# Patient Record
Sex: FEMALE | Race: WHITE | NOT HISPANIC OR LATINO | Employment: OTHER | URBAN - NONMETROPOLITAN AREA
[De-identification: names, ages, dates, MRNs, and addresses within clinical notes are randomized per-mention and may not be internally consistent; named-entity substitution may affect disease eponyms.]

---

## 2017-02-01 ENCOUNTER — COMMUNICATION - GICH (OUTPATIENT)
Dept: INTERNAL MEDICINE | Facility: OTHER | Age: 82
End: 2017-02-01

## 2017-02-01 DIAGNOSIS — I10 ESSENTIAL (PRIMARY) HYPERTENSION: ICD-10-CM

## 2017-02-20 ENCOUNTER — OFFICE VISIT - GICH (OUTPATIENT)
Dept: INTERNAL MEDICINE | Facility: OTHER | Age: 82
End: 2017-02-20

## 2017-02-20 ENCOUNTER — HISTORY (OUTPATIENT)
Dept: INTERNAL MEDICINE | Facility: OTHER | Age: 82
End: 2017-02-20

## 2017-02-20 DIAGNOSIS — I42.9 CARDIOMYOPATHY (H): ICD-10-CM

## 2017-02-20 DIAGNOSIS — E78.1 PURE HYPERGLYCERIDEMIA: ICD-10-CM

## 2017-02-20 DIAGNOSIS — E11.9 TYPE 2 DIABETES MELLITUS WITHOUT COMPLICATIONS (H): ICD-10-CM

## 2017-02-20 DIAGNOSIS — M1A.9XX0 CHRONIC GOUT WITHOUT TOPHUS: ICD-10-CM

## 2017-02-20 DIAGNOSIS — D64.9 ANEMIA: ICD-10-CM

## 2017-02-20 DIAGNOSIS — I48.20 CHRONIC ATRIAL FIBRILLATION (H): ICD-10-CM

## 2017-02-20 DIAGNOSIS — I10 ESSENTIAL (PRIMARY) HYPERTENSION: ICD-10-CM

## 2017-02-20 DIAGNOSIS — E66.9 OBESITY: ICD-10-CM

## 2017-02-20 DIAGNOSIS — N18.30 CHRONIC KIDNEY DISEASE, STAGE III (MODERATE) (H): ICD-10-CM

## 2017-02-20 LAB
A/G RATIO - HISTORICAL: 1.1 (ref 1–2)
ABSOLUTE BASOPHILS - HISTORICAL: 0 THOU/CU MM
ABSOLUTE EOSINOPHILS - HISTORICAL: 0.1 THOU/CU MM
ABSOLUTE LYMPHOCYTES - HISTORICAL: 1.8 THOU/CU MM (ref 0.9–2.9)
ABSOLUTE MONOCYTES - HISTORICAL: 0.4 THOU/CU MM
ABSOLUTE NEUTROPHILS - HISTORICAL: 3.9 THOU/CU MM (ref 1.7–7)
ALBUMIN SERPL-MCNC: 3.8 G/DL (ref 3.5–5.7)
ALP SERPL-CCNC: 48 IU/L (ref 34–104)
ALT (SGPT) - HISTORICAL: 6 IU/L (ref 7–52)
ANION GAP - HISTORICAL: 11 (ref 5–18)
AST SERPL-CCNC: 11 IU/L (ref 13–39)
BASOPHILS # BLD AUTO: 0.8 %
BILIRUB SERPL-MCNC: 0.5 MG/DL (ref 0.3–1)
BUN SERPL-MCNC: 28 MG/DL (ref 7–25)
BUN/CREAT RATIO - HISTORICAL: 20
CALCIUM SERPL-MCNC: 8.9 MG/DL (ref 8.6–10.3)
CHLORIDE SERPLBLD-SCNC: 104 MMOL/L (ref 98–107)
CHOL/HDL RATIO - HISTORICAL: 3.13
CHOLESTEROL TOTAL: 122 MG/DL
CO2 SERPL-SCNC: 25 MMOL/L (ref 21–31)
CREAT SERPL-MCNC: 1.39 MG/DL (ref 0.7–1.3)
EOSINOPHIL NFR BLD AUTO: 2.3 %
ERYTHROCYTE [DISTWIDTH] IN BLOOD BY AUTOMATED COUNT: 13.2 % (ref 11.5–15.5)
ESTIMATED AVERAGE GLUCOSE: 120 MG/DL
GFR IF NOT AFRICAN AMERICAN - HISTORICAL: 36 ML/MIN/1.73M2
GLOBULIN - HISTORICAL: 3.4 G/DL (ref 2–3.7)
GLUCOSE SERPL-MCNC: 122 MG/DL (ref 70–105)
HCT VFR BLD AUTO: 35.1 % (ref 33–51)
HDLC SERPL-MCNC: 39 MG/DL (ref 23–92)
HEMOGLOBIN A1C MONITORING (POCT) - HISTORICAL: 5.8 % (ref 4–6.2)
HEMOGLOBIN: 11.8 G/DL (ref 12–16)
LDLC SERPL CALC-MCNC: 54 MG/DL
LYMPHOCYTES NFR BLD AUTO: 28.2 % (ref 20–44)
MCH RBC QN AUTO: 32.7 PG (ref 26–34)
MCHC RBC AUTO-ENTMCNC: 33.5 G/DL (ref 32–36)
MCV RBC AUTO: 98 FL (ref 80–100)
MONOCYTES NFR BLD AUTO: 6.2 %
NEUTROPHILS NFR BLD AUTO: 62.6 % (ref 42–72)
NON-HDL CHOLESTEROL - HISTORICAL: 83 MG/DL
PATIENT STATUS - HISTORICAL: NORMAL
PLATELET # BLD AUTO: 168 THOU/CU MM (ref 140–440)
PMV BLD: 6.1 FL (ref 6.5–11)
POTASSIUM SERPL-SCNC: 5 MMOL/L (ref 3.5–5.1)
PROT SERPL-MCNC: 7.2 G/DL (ref 6.4–8.9)
RED BLOOD COUNT - HISTORICAL: 3.59 MIL/CU MM (ref 4–5.2)
SODIUM SERPL-SCNC: 140 MMOL/L (ref 133–143)
TRIGL SERPL-MCNC: 146 MG/DL
TSH - HISTORICAL: 1.69 UIU/ML (ref 0.34–5.6)
WHITE BLOOD COUNT - HISTORICAL: 6.2 THOU/CU MM (ref 4.5–11)

## 2017-04-30 ENCOUNTER — COMMUNICATION - GICH (OUTPATIENT)
Dept: INTERNAL MEDICINE | Facility: OTHER | Age: 82
End: 2017-04-30

## 2017-04-30 DIAGNOSIS — B35.9 DERMATOPHYTOSIS: ICD-10-CM

## 2017-05-26 ENCOUNTER — COMMUNICATION - GICH (OUTPATIENT)
Dept: INTERNAL MEDICINE | Facility: OTHER | Age: 82
End: 2017-05-26

## 2017-05-26 DIAGNOSIS — R32 URINARY INCONTINENCE: ICD-10-CM

## 2017-06-05 ENCOUNTER — COMMUNICATION - GICH (OUTPATIENT)
Dept: INTERNAL MEDICINE | Facility: OTHER | Age: 82
End: 2017-06-05

## 2017-06-05 DIAGNOSIS — D64.9 ANEMIA: ICD-10-CM

## 2017-06-20 ENCOUNTER — COMMUNICATION - GICH (OUTPATIENT)
Dept: INTERNAL MEDICINE | Facility: OTHER | Age: 82
End: 2017-06-20

## 2017-06-20 DIAGNOSIS — I10 ESSENTIAL (PRIMARY) HYPERTENSION: ICD-10-CM

## 2017-08-08 ENCOUNTER — HISTORY (OUTPATIENT)
Dept: INTERNAL MEDICINE | Facility: OTHER | Age: 82
End: 2017-08-08

## 2017-08-08 ENCOUNTER — OFFICE VISIT - GICH (OUTPATIENT)
Dept: INTERNAL MEDICINE | Facility: OTHER | Age: 82
End: 2017-08-08

## 2017-08-08 DIAGNOSIS — E11.9 TYPE 2 DIABETES MELLITUS WITHOUT COMPLICATIONS (H): ICD-10-CM

## 2017-08-08 DIAGNOSIS — N18.30 CHRONIC KIDNEY DISEASE, STAGE III (MODERATE) (H): ICD-10-CM

## 2017-08-08 DIAGNOSIS — D64.9 ANEMIA: ICD-10-CM

## 2017-08-08 LAB
ESTIMATED AVERAGE GLUCOSE: 128 MG/DL
HEMOGLOBIN A1C MONITORING (POCT) - HISTORICAL: 6.1 % (ref 4–6.2)

## 2017-08-08 ASSESSMENT — PATIENT HEALTH QUESTIONNAIRE - PHQ9: SUM OF ALL RESPONSES TO PHQ QUESTIONS 1-9: 0

## 2017-09-19 ENCOUNTER — OFFICE VISIT - GICH (OUTPATIENT)
Dept: INTERNAL MEDICINE | Facility: OTHER | Age: 82
End: 2017-09-19

## 2017-09-19 ENCOUNTER — COMMUNICATION - GICH (OUTPATIENT)
Dept: INTERNAL MEDICINE | Facility: OTHER | Age: 82
End: 2017-09-19

## 2017-09-19 ENCOUNTER — HISTORY (OUTPATIENT)
Dept: INTERNAL MEDICINE | Facility: OTHER | Age: 82
End: 2017-09-19

## 2017-09-19 DIAGNOSIS — R60.9 EDEMA: ICD-10-CM

## 2017-09-19 ASSESSMENT — PATIENT HEALTH QUESTIONNAIRE - PHQ9: SUM OF ALL RESPONSES TO PHQ QUESTIONS 1-9: 0

## 2017-10-03 ENCOUNTER — AMBULATORY - GICH (OUTPATIENT)
Dept: FAMILY MEDICINE | Facility: OTHER | Age: 82
End: 2017-10-03

## 2017-10-03 DIAGNOSIS — Z23 ENCOUNTER FOR IMMUNIZATION: ICD-10-CM

## 2017-10-20 ENCOUNTER — COMMUNICATION - GICH (OUTPATIENT)
Dept: INTERNAL MEDICINE | Facility: OTHER | Age: 82
End: 2017-10-20

## 2017-12-27 NOTE — PROGRESS NOTES
Patient Information     Patient Name MRN Sex Lety Contreras [id number] Female 1929      Progress Notes by Rodney Nelson MD at 2017  8:40 AM     Author:  Rodney Nelson MD Service:  (none) Author Type:  Physician     Filed:  2017  8:57 AM Encounter Date:  2017 Status:  Signed     :  Rodney Nelson MD (Physician)            SUBJECTIVE:    Lety Luna is a 88 y.o. female who presents for recheck on DM.    HPI Comments: This patient comes in today for follow-up on her medical problems. She has type 2 diabetes mellitus and takes metformin on a regular basis. Her historical A1c values have been very good. She admits that she eats too much bread and in fact her weight is up from a year ago. We did talk about this today and she will try to limit the amount of bread that she is eating. She also has a history of chronic anemia from chronic GI loss. She is on iron therapy daily. 6 months ago her hemoglobin was quite good at 11.8. She has not noticed any further bleeding. She has a history of atrial fibrillation but she is not on anticoagulation because of the above.    She also has a history of chronic kidney disease. Her renal function has been very stable including her last visit and last analysis of this. She has treated hypertension and tolerates her medications. She does not have any problems with peripheral edema, she does watch this on a regular basis and she does try to avoid sodium.    Other than that she has no complaints or concerns. She just wishes she could walk a little bit better. She does use a walker for balance and safety.      No Known Allergies,   Current Outpatient Prescriptions     Medication  Sig     allopurinol (ZYLOPRIM) 100 mg tablet Take 1 tablet by mouth once daily.     aspirin 81 mg tablet Take 81 mg by mouth once daily with a meal.     captopril (CAPOTEN) 50 mg tablet Take 1 tablet by mouth 2 times daily.     diltiazem CD (CARDIZEM CD) 240 mg extended release  24 hr capsule Take 1 capsule by mouth once daily.     DME Urinary incontinence pad / panty liner     ferrous sulfate, 65 mg elemental, tablet TAKE 1 TABLET BY MOUTH TWICE DAILY WITH MEALS     LOTRIMIN AF 2 % powder APPLY TOPICALLY TO THE AFFECTED AREA TWICE DAILY IF NEEDED     metFORMIN (GLUCOPHAGE) 500 mg tablet Take 1 tablet by mouth once daily with a meal.     metoprolol succinate SR (TOPROL XL) 200 mg Sustained-Release tablet Take 1 tablet by mouth once daily.     omeprazole (PRILOSEC) 20 mg Delayed-Release capsule Take 1 capsule by mouth once daily before a meal.     prazosin (MINIPRESS) 5 mg capsule TAKE 2 CAPSULES BY MOUTH EVERY MORNING AND TAKE 1 CAPSULE BY MOUTH EVERY EVENING     triamterene-hydrochlorothiazide, 37.5-25 mg, (DYAZIDE) 37.5-25 mg capsule Take 1 capsule by mouth every morning.     No current facility-administered medications for this visit.      Medications have been reviewed by me and are current to the best of my knowledge and ability. ,   Past Medical History:     Diagnosis  Date     Atrial fibrillation (HC)     No anticoagulation due to GI bleed      Chronic kidney disease (CKD), stage III (moderate)      Diabetes mellitus type II      GI bleed 2012    3 units pRBC      Gout      Hyperlipidemia      Hypertension      Learning disability      Obesity      Osteopenia     and   Patient Active Problem List       Diagnosis  Date Noted     Chronic anemia  07/22/2015     Cardiomyopathy (HC)  09/16/2014     Probable rate related from atrial fibrillation        GI BLEEDING  07/25/2012     WEIGHT LOSS  07/25/2012     CANDIDIASIS  01/21/2012     ARTHRITIS  10/04/2010     ATRIAL FIBRILLATION  02/09/2010     CHRONIC KIDNEY DISEASE STAGE III (MODERATE)  02/09/2010     HYPERTENSION       GOUT       LEARNING DISABILITY       DIABETES MELLITUS, TYPE II       OSTEOPENIA       OBESITY       HYPERTRIGLYCERIDEMIA         REVIEW OF SYSTEMS:  Review of Systems   All other systems reviewed and are  "negative.      OBJECTIVE:  /78  Pulse 80  Ht 1.549 m (5' 1\")  Wt 84.7 kg (186 lb 12.8 oz)  Breastfeeding? No  BMI 35.3 kg/m2    EXAM:   Physical Exam   Constitutional: She is well-developed, well-nourished, and in no distress. No distress.   Cardiovascular: Normal rate.  An irregularly irregular rhythm present.   Pulmonary/Chest: Effort normal and breath sounds normal. No respiratory distress. She has no wheezes. She has no rales.   Musculoskeletal: She exhibits no edema.   Skin: Skin is warm and dry. She is not diaphoretic.   Nursing note and vitals reviewed.      ASSESSMENT/PLAN:    ICD-10-CM    1. DIABETES MELLITUS, TYPE II E11.9 HEMOGLOBIN A1C MONITORING (POCT)   2. CHRONIC KIDNEY DISEASE STAGE III (MODERATE) N18.3    3. Chronic anemia D64.9         Plan:  She really appears to be doing well aside from the fact that her weight is up from 6 months ago and a year ago. I encouraged her to pay more attention to this and try to get her weight down a little bit. Medications will continue the same. The only lab I elected to do today was an A1c as 6 months ago her other lab including renal function and hemoglobin really looked quite good and stable. I will send her a letter with the results. Assuming all goes well, she will follow-up in 6 months for a yearly review and yearly lab work. Obviously follow-up sooner for problems.          "

## 2017-12-28 NOTE — TELEPHONE ENCOUNTER
Patient Information     Patient Name MRN Lety Rush [id number] Female 1929      Telephone Encounter by Jessica Correa LPN at 2017  9:11 AM     Author:  Jessica Correa LPN Service:  (none) Author Type:  NURS- Licensed Practical Nurse     Filed:  2017  9:12 AM Encounter Date:  2017 Status:  Signed     :  Jessica Correa LPN (NURS- Licensed Practical Nurse)            Contacted the patient and gave her the appointment time below. The patient was placed in Rodney Nelson MD schedule.  Jessica Correa LPN......2017  9:11 AM

## 2017-12-28 NOTE — PROGRESS NOTES
Patient Information     Patient Name MRN Sex Lety Contreras [id number] Female 1929      Progress Notes by Rodney Nelson MD at 2017  2:20 PM     Author:  Rodney Nelson MD Service:  (none) Author Type:  Physician     Filed:  2017  2:11 PM Encounter Date:  2017 Status:  Signed     :  Rodney Nelson MD (Physician)            SUBJECTIVE:    Lety Luna is a 88 y.o. female who presents for ankle pain and swelling.    HPI Comments: She is in today concerned about swelling of her left ankle. It's been that way for the last 2-3 weeks. There was no injury. There is no particular pain associated with this. It's definitely just the left side more than anything or anywhere else that is bothersome for her. She hasn't had this problem in the past.      No Known Allergies,   Current Outpatient Prescriptions     Medication  Sig     allopurinol (ZYLOPRIM) 100 mg tablet Take 1 tablet by mouth once daily.     aspirin 81 mg tablet Take 81 mg by mouth once daily with a meal.     captopril (CAPOTEN) 50 mg tablet Take 1 tablet by mouth 2 times daily.     diltiazem CD (CARDIZEM CD) 240 mg extended release 24 hr capsule Take 1 capsule by mouth once daily.     DME Urinary incontinence pad / panty liner     ferrous sulfate, 65 mg elemental, tablet TAKE 1 TABLET BY MOUTH TWICE DAILY WITH MEALS     LOTRIMIN AF 2 % powder APPLY TOPICALLY TO THE AFFECTED AREA TWICE DAILY IF NEEDED     metFORMIN (GLUCOPHAGE) 500 mg tablet Take 1 tablet by mouth once daily with a meal.     metoprolol succinate SR (TOPROL XL) 200 mg Sustained-Release tablet Take 1 tablet by mouth once daily.     omeprazole (PRILOSEC) 20 mg Delayed-Release capsule Take 1 capsule by mouth once daily before a meal.     prazosin (MINIPRESS) 5 mg capsule TAKE 2 CAPSULES BY MOUTH EVERY MORNING AND TAKE 1 CAPSULE BY MOUTH EVERY EVENING     triamterene-hydrochlorothiazide, 37.5-25 mg, (DYAZIDE) 37.5-25 mg capsule Take 1 capsule by mouth every  morning.     No current facility-administered medications for this visit.      Medications have been reviewed by me and are current to the best of my knowledge and ability. ,   Past Medical History:     Diagnosis  Date     Atrial fibrillation (HC)     No anticoagulation due to GI bleed      Chronic kidney disease (CKD), stage III (moderate)      Diabetes mellitus type II      GI bleed 2012    3 units pRBC      Gout      Hyperlipidemia      Hypertension      Learning disability      Obesity      Osteopenia     and   Patient Active Problem List       Diagnosis  Date Noted     Peripheral edema  09/19/2017     Chronic anemia  07/22/2015     Cardiomyopathy (HC)  09/16/2014     Probable rate related from atrial fibrillation        GI BLEEDING  07/25/2012     WEIGHT LOSS  07/25/2012     CANDIDIASIS  01/21/2012     ARTHRITIS  10/04/2010     ATRIAL FIBRILLATION  02/09/2010     CHRONIC KIDNEY DISEASE STAGE III (MODERATE)  02/09/2010     HYPERTENSION       GOUT       LEARNING DISABILITY       DIABETES MELLITUS, TYPE II       OSTEOPENIA       OBESITY       HYPERTRIGLYCERIDEMIA         REVIEW OF SYSTEMS:  Review of Systems   All other systems reviewed and are negative.      OBJECTIVE:  /82  Pulse 80  Wt 85.3 kg (188 lb)  Breastfeeding? No  BMI 35.52 kg/m2    EXAM:   Physical Exam   Constitutional: She is well-developed, well-nourished, and in no distress. No distress.   Musculoskeletal: She exhibits edema.   2+ edema left, 1+ edema right   Skin: She is not diaphoretic.   Nursing note and vitals reviewed.      ASSESSMENT/PLAN:    ICD-10-CM    1. Peripheral edema R60.9 furosemide (LASIX) 20 mg tablet        Plan:  She has some mild peripheral edema. I suggested that she try to keep her legs elevated and avoid sodium. Lasix 20 mg daily when necessary swelling was also provided. Follow-up if not improving with this.

## 2017-12-28 NOTE — TELEPHONE ENCOUNTER
Patient Information     Patient Name MRN Lety Rush [id number] Female 1929      Telephone Encounter by La Wayne at 2017  8:32 AM     Author:  La Wayne Service:  (none) Author Type:  (none)     Filed:  2017  8:35 AM Encounter Date:  2017 Status:  Signed     :  La Wayne            Patients left ankle is swollen. She has been soaking it to see if that will help. She said its been like this for a couple weeks now. It swells up and then goes down. She would like to get in for an appointment this week.    La Wayne ....................  2017   8:34 AM'

## 2017-12-28 NOTE — TELEPHONE ENCOUNTER
"Patient Information     Patient Name MRN Lety Rush [id number] Female 1929      Telephone Encounter by Ellen Monroe RN at 10/20/2017  9:32 AM     Author:  Ellen Monroe RN Service:  (none) Author Type:  NURS- Registered Nurse     Filed:  10/20/2017  9:42 AM Encounter Date:  10/20/2017 Status:  Signed     :  Ellen Monroe RN (NURS- Registered Nurse)            Verified last name and date of birth.     Patient instructed to continue applying antibiotic ointment to sore with band-aid, allowing sore to dry out from time to time for scab formation. Patient removed scab today after bath to let it air out. Instructed Patient to call back or make an appointment if signs of infection (redness, swelling or warmth appear) or if she has been treating >1week and it is not improving. Patient verbalized agreement with plan.    Reason for Disposition    1 or 2 small sores    Answer Assessment - Initial Assessment Questions  1. APPEARANCE of SORES: \"What do the sores look like?\"  Sore with scab, brown around the edges.  2. NUMBER: \"How many sores are there?\"      1  3. SIZE: \"How big is the largest sore?\"      Size of thumb nail  4. LOCATION: \"Where are the sores located?\"      Left knee  5. ONSET: \"When did the sores begin?\"      Last 2-3 days  6. CAUSE: \"What do you think is causing the sores?\"      Doesn't remember hurting her knee  7. OTHER SYMPTOMS: \"Do you have any other symptoms?\" (e.g., fever, new weakness)  No, denies redness, warmth or yellow discharge, pain or fever.    Patient has been putting neosporin and band-aids on the sore.    Protocols used: ADULT SORES-A-    Ellen Monroe RN .............. 10/20/2017  9:42 AM              "

## 2017-12-28 NOTE — TELEPHONE ENCOUNTER
Patient Information     Patient Name MRN Sex Lety Contreras [id number] Female 1929      Telephone Encounter by Evonne Milton RN at 2017  9:49 AM     Author:  Evonne Milton RN Service:  (none) Author Type:  NURS- Registered Nurse     Filed:  2017 10:08 AM Encounter Date:  2017 Status:  Signed     :  Evonne Milton RN (NURS- Registered Nurse)            Redundant Refill Request refused;  Medication:diltiazem CD (CARDIZEM CD) 240 mg extended release 24 hr capsule    Qty:90 capsule  Ref:3  Start:2017   Unable to complete prescription refill per RN Medication Refill Policy.................... Evonne Milton RN ....................  2017   10:04 AM

## 2017-12-28 NOTE — TELEPHONE ENCOUNTER
Patient Information     Patient Name MRN Sex Lety Contreras [id number] Female 1929      Telephone Encounter by Ellen Urena RN at 2017  3:11 PM     Author:  Ellen Urena RN Service:  (none) Author Type:  NURS- Registered Nurse     Filed:  2017  3:13 PM Encounter Date:  2017 Status:  Signed     :  Ellen Urena RN (NURS- Registered Nurse)            Office visit in the past 12 months or per provider note.    Last visit with ANASTASIIA ARGUETA was on: 2017 in [facility]  Next visit with ANASTASIIA ARGUETA is on: No future appointment listed with this provider  Next visit with Internal Medicine is on: No future appointment listed in this department    Lab test requirements:  Annual hemoglobin.  HEMOGLOBIN                (g/dL)    Date Value   2017 11.8 (L)       Max refill for 12 months from last office visit or per provider note.    Prescription refilled per RN Medication Refill Policy.................... Ellen Urena RN ....................  2017   3:13 PM

## 2017-12-28 NOTE — TELEPHONE ENCOUNTER
Patient Information     Patient Name MRN Sex Lety Contreras [id number] Female 1929      Telephone Encounter by Rodney Nelson MD at 2017  9:09 AM     Author:  Rodney Nelson MD Service:  (none) Author Type:  Physician     Filed:  2017  9:09 AM Encounter Date:  2017 Status:  Signed     :  Rodney Nelson MD (Physician)            2:20 PM today

## 2017-12-30 NOTE — NURSING NOTE
Patient Information     Patient Name MRN Sex Lety Contreras [id number] Female 1929      Nursing Note by Jessica Correa LPN at 2017  2:20 PM     Author:  Jessica Correa LPN Service:  (none) Author Type:  NURS- Licensed Practical Nurse     Filed:  2017  2:00 PM Encounter Date:  2017 Status:  Signed     :  Jessica Correa LPN (NURS- Licensed Practical Nurse)            The patient is here today to have her left ankle looked at for some swelling. She states it has been going on for about 2-3 weeks.  Jessica Correa LPN......2017  1:54 PM

## 2017-12-30 NOTE — NURSING NOTE
Patient Information     Patient Name MRN Lety Rush [id number] Female 1929      Nursing Note by Jessica Correa LPN at 2017  8:40 AM     Author:  Jessica Correa LPN Service:  (none) Author Type:  NURS- Licensed Practical Nurse     Filed:  2017  8:46 AM Encounter Date:  2017 Status:  Signed     :  Jessica Correa LPN (NURS- Licensed Practical Nurse)            The patient is here today to have a diabetic check up. The patient last saw her eye doctor in 2017.  Jessica Correa LPN......2017  8:25 AM

## 2018-01-03 NOTE — NURSING NOTE
Patient Information     Patient Name MRN Lety Rush [id number] Female 1929      Nursing Note by Jessica Correa LPN at 2017  8:30 AM     Author:  Jessica Correa LPN Service:  (none) Author Type:  NURS- Licensed Practical Nurse     Filed:  2017  8:26 AM Encounter Date:  2017 Status:  Signed     :  Jessica Correa LPN (NURS- Licensed Practical Nurse)            The patient is here today to have a yearly check up done.  Jessica Correa LPN......2017  8:18 AM

## 2018-01-03 NOTE — PROGRESS NOTES
Patient Information     Patient Name MRN Sex Lety Contreras [id number] Female 1929      Progress Notes by Rodney Nelson MD at 2017  8:30 AM     Author:  Rodney Nelson MD Service:  (none) Author Type:  Physician     Filed:  2017  8:51 AM Encounter Date:  2017 Status:  Signed     :  Rodney Nelson MD (Physician)            SUBJECTIVE:    Lety Luna is a 87 y.o. female who presents for comprehensive review of their multiple medical problems and review of medications, renewal of medications and update on necessary health maintenance issues.      HPI Comments: She is here today for complete evaluation and review of her chronic medical problems. She has a history of type 2 diabetes mellitus. She takes metformin once daily and this results in excellent control of her diabetes. She is due for recheck at this time.    She also has a history of chronic atrial fibrillation and cardiomyopathy. Her ejection fraction is approximately 40%. She is not on any anticoagulation due to previous history of GI bleeding. She is stable with her heart and her heart disease and has no complaints related to that.    She has significant hypertension and is on multidrug therapy for this. She does have some mild chronic kidney disease but not enough to preclude her from using low-dose metformin. She also has a history of chronic anemia as related to her previous GI bleeding and takes iron on a regular basis. She is due for recheck on that.    She no longer gets much for health maintenance things including mammography. She is due for some lab work. Immunizations are all up to date. I spent time today updating in reviewing her past medical history, past surgical history, family history and social history.      No Known Allergies,   Current Outpatient Prescriptions     Medication  Sig     allopurinol (ZYLOPRIM) 100 mg tablet Take 1 tablet by mouth once daily.     aspirin 81 mg tablet Take 81 mg by mouth  once daily with a meal.     captopril (CAPOTEN) 50 mg tablet Take 1 tablet by mouth 2 times daily.     diltiazem CD (CARDIZEM CD) 240 mg extended release 24 hr capsule Take 1 capsule by mouth once daily.     ferrous sulfate, 65 mg elemental, tablet Take 1 tablet by mouth 2 times daily with meals.     metFORMIN (GLUCOPHAGE) 500 mg tablet Take 1 tablet by mouth once daily with a meal.     metoprolol succinate SR (TOPROL XL) 200 mg Sustained-Release tablet TAKE 1 TABLET BY MOUTH ONCE DAILY     miconazole nitrate powder (LOTRIMIN AF) 2 % powder Apply  topically to affected area(s) 2 times daily if needed for Other (Specify).     omeprazole (PRILOSEC) 20 mg Delayed-Release capsule Take 1 capsule by mouth once daily before a meal.     prazosin (MINIPRESS) 5 mg capsule TAKE 2 CAPSULES BY MOUTH EVERY MORNING AND TAKE 1 CAPSULE BY MOUTH EVERY EVENING     triamterene-hydrochlorothiazide, 37.5-25 mg, (DYAZIDE) 37.5-25 mg capsule Take 1 capsule by mouth every morning.     No current facility-administered medications for this visit.      Medications have been reviewed by me and are current to the best of my knowledge and ability. ,   Past Medical History      Diagnosis   Date     Atrial fibrillation (HC)       No anticoagulation due to GI bleed      Chronic kidney disease (CKD), stage III (moderate)       Diabetes mellitus type II       GI bleed  2012     3 units pRBC      Gout       Hyperlipidemia       Hypertension       Learning disability       Obesity       Osteopenia     ,   Patient Active Problem List       Diagnosis  Date Noted     Chronic anemia  07/22/2015     Cardiomyopathy (HC)  09/16/2014     Probable rate related from atrial fibrillation        GI BLEEDING  07/25/2012     WEIGHT LOSS  07/25/2012     CANDIDIASIS  01/21/2012     ARTHRITIS  10/04/2010     ATRIAL FIBRILLATION  02/09/2010     CHRONIC KIDNEY DISEASE STAGE III (MODERATE)  02/09/2010     HYPERTENSION       GOUT       LEARNING DISABILITY       DIABETES  MELLITUS, TYPE II       OSTEOPENIA       OBESITY       HYPERTRIGLYCERIDEMIA     ,   Past Surgical History      Procedure  Laterality Date     Appendectomy       Dilation and curettage       Tubal ligation       Cataract removal Bilateral     and   Social History     Substance Use Topics       Smoking status: Never Smoker     Smokeless tobacco: Not on file     Alcohol use No     Family Status     Relation  Status     Father     heart problems and diabetes      Mother     heart problems and diabetes      Son Alive     Brother      Sister      Son      Sister      Brother      Social History     Social History        Marital status:       Spouse name: N/A     Number of children:  N/A     Years of education:  N/A     Social History Main Topics       Smoking status: Never Smoker     Smokeless tobacco: None     Alcohol use No     Drug use: No     Sexual activity: Not Asked     Other Topics  Concern     None      Social History Narrative     Cigarettes-none.  Alcohol-none.     and lives in an apartment in town.                     REVIEW OF SYSTEMS:  Review of Systems   Constitutional: Negative for chills, diaphoresis, fever, malaise/fatigue and weight loss.   HENT: Negative for congestion, ear pain, nosebleeds, sore throat and tinnitus.    Eyes: Negative for blurred vision, double vision, photophobia, pain, discharge and redness.   Respiratory: Negative for cough, hemoptysis, sputum production, shortness of breath and wheezing.    Cardiovascular: Negative for chest pain, palpitations, orthopnea, claudication, leg swelling and PND.   Gastrointestinal: Negative for abdominal pain, blood in stool, constipation, diarrhea, heartburn, nausea and vomiting.   Genitourinary: Negative for dysuria, flank pain and hematuria.   Musculoskeletal: Negative for back pain, joint pain, myalgias and neck pain.   Skin: Negative for itching and rash.   Neurological: Negative for  "dizziness, tingling, tremors, speech change, loss of consciousness, weakness and headaches.   Psychiatric/Behavioral: Negative for depression, hallucinations, memory loss, substance abuse and suicidal ideas. The patient is not nervous/anxious.        OBJECTIVE:  /82  Pulse 84  Ht 1.549 m (5' 1\")  Wt 83.8 kg (184 lb 12.8 oz)  Breastfeeding? No  BMI 34.92 kg/m2    EXAM:   Physical Exam   Constitutional: She is oriented to person, place, and time and well-developed, well-nourished, and in no distress. No distress.   HENT:   Head: Normocephalic and atraumatic.   Right Ear: Tympanic membrane and external ear normal.   Left Ear: Tympanic membrane and external ear normal.   Nose: Nose normal.   Mouth/Throat: Oropharynx is clear and moist and mucous membranes are normal. No oropharyngeal exudate.   Eyes: Conjunctivae and EOM are normal. Pupils are equal, round, and reactive to light.   Neck: Trachea normal and normal range of motion. Neck supple. Normal carotid pulses, no hepatojugular reflux and no JVD present. Carotid bruit is not present. No thyroid mass and no thyromegaly present.   Cardiovascular: Normal rate, normal heart sounds and intact distal pulses.  An irregularly irregular rhythm present. Exam reveals no gallop and no friction rub.    No murmur heard.  Pulmonary/Chest: Breath sounds normal. No respiratory distress. She has no decreased breath sounds. She has no wheezes. She has no rhonchi. She has no rales. She exhibits no tenderness. Right breast exhibits no inverted nipple, no mass, no nipple discharge, no skin change and no tenderness. Left breast exhibits no inverted nipple, no mass, no nipple discharge, no skin change and no tenderness. Breasts are symmetrical.   Abdominal: Soft. Bowel sounds are normal. She exhibits no distension and no mass. There is no hepatosplenomegaly. There is no tenderness. There is no rebound and no guarding. No hernia.   Musculoskeletal: Normal range of motion. She exhibits " no edema or tenderness.   Lymphadenopathy:     She has no cervical adenopathy.   Neurological: She is alert and oriented to person, place, and time. She has normal motor skills and normal reflexes. No cranial nerve deficit. Gait normal. Coordination normal.   Skin: Skin is warm and dry. No rash noted. She is not diaphoretic. No cyanosis or erythema. No pallor. Nails show no clubbing.   Psychiatric: Memory, affect and judgment normal.   Nursing note and vitals reviewed.      ASSESSMENT/PLAN:    ICD-10-CM    1. Chronic anemia D64.9 CBC AND DIFFERENTIAL   2. Cardiomyopathy (HC) I42.9    3. Chronic atrial fibrillation (HC) I48.2    4. CHRONIC KIDNEY DISEASE STAGE III (MODERATE) N18.3 COMP METABOLIC PANEL   5. HYPERTENSION I10    6. Chronic gout without tophus, unspecified cause, unspecified site [id number]    7. DIABETES MELLITUS, TYPE II E11.9 HEMOGLOBIN A1C MONITORING (POCT)      TSH   8. HYPERTRIGLYCERIDEMIA E78.1 LIPID PANEL   9. Obesity, unspecified obesity severity, unspecified obesity type E66.9         Plan:  Her exam today is stable. Everything else is up-to-date and acceptable. She will continue with her current medications without any change. Complete lab panel is pending and I will send a letter with the results. If that looks normal think that will be very reassuring that everything is stable and under control medically. If there are any abnormalities, we may have to address those. Follow-up will be dependent on her results. Medications are refilled for her today without change.

## 2018-01-03 NOTE — TELEPHONE ENCOUNTER
Patient Information     Patient Name MRN Sex Lety Contreras [id number] Female 1929      Telephone Encounter by Cecy Gresham RN at 2017  2:30 PM     Author:  Cecy Gresham RN Service:  (none) Author Type:  NURS- Registered Nurse     Filed:  2017  2:36 PM Encounter Date:  2017 Status:  Signed     :  Cecy Gresham RN (NURS- Registered Nurse)            Beta Blockers     Office visit in the past 12 months or per provider note.    Last visit with ANASTASIIA ARGUETA was on: 2016 in [facility]  Next visit with ANASTASIIA ARGUETA is on: 2017 in [facility]  Next visit with Internal Medicine is on: 2017 in [facility]    Max refill for 12 months from last office visit or per provider note.  Prescription refilled per RN Medication Refill Policy.................... CECY GRESHAM RN ....................  2017   2:35 PM

## 2018-01-04 ENCOUNTER — COMMUNICATION - GICH (OUTPATIENT)
Dept: INTERNAL MEDICINE | Facility: OTHER | Age: 83
End: 2018-01-04

## 2018-01-04 DIAGNOSIS — R60.9 EDEMA: ICD-10-CM

## 2018-01-04 NOTE — TELEPHONE ENCOUNTER
Patient Information     Patient Name MRN Sex Lety Contreras [id number] Female 1929      Telephone Encounter by Tammy Kaur RN at 2017 12:15 PM     Author:  Tammy Kaur RN Service:  (none) Author Type:  NURS- Registered Nurse     Filed:  2017 12:18 PM Encounter Date:  2017 Status:  Signed     :  Tammy Kaur RN (NURS- Registered Nurse)            Medication is not on refill protocol. Unable to complete prescription refill per RN Medication Refill Policy.................... TAMMY KAUR RN ....................  2017   12:16 PM        This is a Refill request from: [facility]  Name of Medication: Lotrimin AF 2% powder  Quantity requested: 90g  Last fill date: 16  Last visit with ANASTASIIA ARGUETA was on: 2017 in [facility]  PCP:  Anastasiia Argueta MD  Controlled Substance Agreement:  na   Diagnosis r/t this medication request: tinea

## 2018-01-05 NOTE — TELEPHONE ENCOUNTER
Patient Information     Patient Name MRLety Davenport [id number] Female 1929      Telephone Encounter by Jessica Correa LPN at 2017  2:24 PM     Author:  Jessica Correa LPN Service:  (none) Author Type:  NURS- Licensed Practical Nurse     Filed:  2017  2:26 PM Encounter Date:  2017 Status:  Signed     :  Jessica Correa LPN (NURS- Licensed Practical Nurse)            Contacted the patients daughter in law ashley, she states the patient is requesting a prescription for urinary incontinence pads. She states that she uses about 3 of them a day. The patient does not want briefs she is requesting a panty liner.  Jessica Correa LPN......2017  2:25 PM

## 2018-01-05 NOTE — TELEPHONE ENCOUNTER
Patient Information     Patient Name MRN Sex Lety Contreras [id number] Female 1929      Telephone Encounter by Clarissa Rees at 2017  1:57 PM     Author:  Clarissa Rees Service:  (none) Author Type:  (none)     Filed:  2017  1:58 PM Encounter Date:  2017 Status:  Signed     :  Clarissa Rees            MAF-DAUGHTER IN LAW WANTS RX FOR STRONGER URINARY INCONTINENCE PADS. CALL [facility] -[phone].  Clarissa Rees ....................  2017   1:57 PM

## 2018-01-23 ENCOUNTER — DOCUMENTATION ONLY (OUTPATIENT)
Dept: FAMILY MEDICINE | Facility: OTHER | Age: 83
End: 2018-01-23

## 2018-01-23 PROBLEM — M94.9 DISORDER OF BONE AND CARTILAGE: Status: ACTIVE | Noted: 2018-01-23

## 2018-01-23 PROBLEM — E78.1 HYPERTRIGLYCERIDEMIA: Status: ACTIVE | Noted: 2018-01-23

## 2018-01-23 PROBLEM — R60.0 PERIPHERAL EDEMA: Status: ACTIVE | Noted: 2017-09-19

## 2018-01-23 PROBLEM — E66.9 OBESITY: Status: ACTIVE | Noted: 2018-01-23

## 2018-01-23 PROBLEM — E11.9 DIABETES MELLITUS, TYPE II (H): Status: ACTIVE | Noted: 2018-01-23

## 2018-01-23 PROBLEM — M89.9 DISORDER OF BONE AND CARTILAGE: Status: ACTIVE | Noted: 2018-01-23

## 2018-01-23 PROBLEM — F81.9 LEARNING DISABILITY: Status: ACTIVE | Noted: 2018-01-23

## 2018-01-23 PROBLEM — I10 HYPERTENSION: Status: ACTIVE | Noted: 2018-01-23

## 2018-01-23 PROBLEM — M10.9 GOUT: Status: ACTIVE | Noted: 2018-01-23

## 2018-01-23 RX ORDER — DILTIAZEM HYDROCHLORIDE 240 MG/1
240 CAPSULE, COATED, EXTENDED RELEASE ORAL DAILY
COMMUNITY
Start: 2017-02-20 | End: 2018-03-15

## 2018-01-23 RX ORDER — FERROUS SULFATE 325(65) MG
325 TABLET ORAL 2 TIMES DAILY WITH MEALS
COMMUNITY
Start: 2017-06-07 | End: 2018-02-26

## 2018-01-23 RX ORDER — CAPTOPRIL 50 MG/1
50 TABLET ORAL 2 TIMES DAILY
COMMUNITY
Start: 2017-02-20 | End: 2018-03-09

## 2018-01-23 RX ORDER — TRIAMTERENE AND HYDROCHLOROTHIAZIDE 37.5; 25 MG/1; MG/1
1 CAPSULE ORAL EVERY MORNING
COMMUNITY
Start: 2017-02-20 | End: 2018-03-15

## 2018-01-23 RX ORDER — ALLOPURINOL 100 MG/1
100 TABLET ORAL DAILY
COMMUNITY
Start: 2017-02-20 | End: 2018-03-15

## 2018-01-23 RX ORDER — PRAZOSIN HYDROCHLORIDE 5 MG/1
2 CAPSULE ORAL EVERY EVENING
COMMUNITY
Start: 2017-02-20 | End: 2018-03-13

## 2018-01-23 RX ORDER — METOPROLOL SUCCINATE 200 MG/1
200 TABLET, EXTENDED RELEASE ORAL DAILY
COMMUNITY
Start: 2017-02-20 | End: 2018-03-15

## 2018-01-23 RX ORDER — FUROSEMIDE 20 MG
1 TABLET ORAL DAILY PRN
Status: ON HOLD | COMMUNITY
Start: 2018-01-05 | End: 2021-01-01

## 2018-01-27 VITALS
WEIGHT: 186.8 LBS | HEART RATE: 80 BPM | DIASTOLIC BLOOD PRESSURE: 78 MMHG | HEIGHT: 61 IN | BODY MASS INDEX: 35.27 KG/M2 | SYSTOLIC BLOOD PRESSURE: 126 MMHG

## 2018-01-27 VITALS
BODY MASS INDEX: 34.89 KG/M2 | DIASTOLIC BLOOD PRESSURE: 82 MMHG | WEIGHT: 184.8 LBS | HEART RATE: 84 BPM | SYSTOLIC BLOOD PRESSURE: 126 MMHG | HEIGHT: 61 IN

## 2018-01-27 VITALS
WEIGHT: 188 LBS | HEART RATE: 80 BPM | BODY MASS INDEX: 35.52 KG/M2 | SYSTOLIC BLOOD PRESSURE: 126 MMHG | DIASTOLIC BLOOD PRESSURE: 82 MMHG

## 2018-01-31 ASSESSMENT — PATIENT HEALTH QUESTIONNAIRE - PHQ9
SUM OF ALL RESPONSES TO PHQ QUESTIONS 1-9: 0
SUM OF ALL RESPONSES TO PHQ QUESTIONS 1-9: 0

## 2018-02-21 ENCOUNTER — DOCUMENTATION ONLY (OUTPATIENT)
Dept: FAMILY MEDICINE | Facility: OTHER | Age: 83
End: 2018-02-21

## 2018-02-26 DIAGNOSIS — E11.9 TYPE 2 DIABETES MELLITUS WITHOUT COMPLICATION, WITHOUT LONG-TERM CURRENT USE OF INSULIN (H): Primary | ICD-10-CM

## 2018-02-26 DIAGNOSIS — D50.9 IRON DEFICIENCY ANEMIA, UNSPECIFIED IRON DEFICIENCY ANEMIA TYPE: Primary | ICD-10-CM

## 2018-02-26 DIAGNOSIS — E11.9 DIABETES MELLITUS, TYPE II (H): ICD-10-CM

## 2018-02-26 RX ORDER — FERROUS SULFATE 325(65) MG
325 TABLET ORAL 2 TIMES DAILY WITH MEALS
Qty: 100 TABLET | Refills: 3 | Status: SHIPPED | OUTPATIENT
Start: 2018-02-26 | End: 2018-09-17

## 2018-02-26 NOTE — TELEPHONE ENCOUNTER
Routing refill request to provider for review/approval because:  Drug not on the [facility] refill protocol       LOV: 9/19/17  Last HGB: 2/20/2017    Cecy Gresham RN on 2/26/2018 at 12:28 PM

## 2018-02-26 NOTE — TELEPHONE ENCOUNTER
Routing refill request to provider for review/approval because:  Labs not current:  Micro albumin, , hgba1c, lipid  Patient needs to be seen because:  Question patient needs 6 month diabetic follow up?    Cecy Gresham RN on 2/26/2018 at 1:57 PM

## 2018-03-09 DIAGNOSIS — I10 BENIGN ESSENTIAL HYPERTENSION: Primary | ICD-10-CM

## 2018-03-09 RX ORDER — CAPTOPRIL 50 MG/1
50 TABLET ORAL 2 TIMES DAILY
Qty: 180 TABLET | Refills: 3 | Status: SHIPPED | OUTPATIENT
Start: 2018-03-09 | End: 2019-02-26

## 2018-03-09 NOTE — TELEPHONE ENCOUNTER
Captopril  LOV-09/19/2017    Routing refill request to provider for review/approval because:  Labs out of range:  02/20/2017 Labs    Unable to complete prescription refill per RNMedication Refill Policy.................... Linda Sanches ....................  3/9/2018   10:41 AM

## 2018-03-13 ENCOUNTER — OFFICE VISIT (OUTPATIENT)
Dept: INTERNAL MEDICINE | Facility: OTHER | Age: 83
End: 2018-03-13
Attending: INTERNAL MEDICINE
Payer: MEDICARE

## 2018-03-13 VITALS
BODY MASS INDEX: 35.9 KG/M2 | SYSTOLIC BLOOD PRESSURE: 134 MMHG | HEART RATE: 88 BPM | DIASTOLIC BLOOD PRESSURE: 82 MMHG | WEIGHT: 190 LBS

## 2018-03-13 DIAGNOSIS — I42.9 CARDIOMYOPATHY, UNSPECIFIED TYPE (H): ICD-10-CM

## 2018-03-13 DIAGNOSIS — I48.20 CHRONIC ATRIAL FIBRILLATION (H): Primary | ICD-10-CM

## 2018-03-13 DIAGNOSIS — D64.9 CHRONIC ANEMIA: ICD-10-CM

## 2018-03-13 DIAGNOSIS — N18.30 CHRONIC KIDNEY DISEASE, STAGE III (MODERATE) (H): ICD-10-CM

## 2018-03-13 DIAGNOSIS — I10 ESSENTIAL HYPERTENSION: ICD-10-CM

## 2018-03-13 DIAGNOSIS — R60.0 PERIPHERAL EDEMA: ICD-10-CM

## 2018-03-13 DIAGNOSIS — E11.9 TYPE 2 DIABETES MELLITUS WITHOUT COMPLICATION, WITHOUT LONG-TERM CURRENT USE OF INSULIN (H): ICD-10-CM

## 2018-03-13 LAB
ALBUMIN SERPL-MCNC: 4 G/DL (ref 3.5–5.7)
ALP SERPL-CCNC: 55 U/L (ref 34–104)
ALT SERPL W P-5'-P-CCNC: 7 U/L (ref 7–52)
ANION GAP SERPL CALCULATED.3IONS-SCNC: 8 MMOL/L (ref 3–14)
AST SERPL W P-5'-P-CCNC: 12 U/L (ref 13–39)
BILIRUB SERPL-MCNC: 0.4 MG/DL (ref 0.3–1)
BUN SERPL-MCNC: 31 MG/DL (ref 7–25)
CALCIUM SERPL-MCNC: 9 MG/DL (ref 8.6–10.3)
CHLORIDE SERPL-SCNC: 105 MMOL/L (ref 98–107)
CHOLEST SERPL-MCNC: 128 MG/DL
CO2 SERPL-SCNC: 26 MMOL/L (ref 21–31)
CREAT SERPL-MCNC: 1.52 MG/DL (ref 0.6–1.2)
ERYTHROCYTE [DISTWIDTH] IN BLOOD BY AUTOMATED COUNT: 12.4 % (ref 10–15)
GFR SERPL CREATININE-BSD FRML MDRD: 32 ML/MIN/1.7M2
GLUCOSE SERPL-MCNC: 146 MG/DL (ref 70–105)
HBA1C MFR BLD: 6.1 % (ref 4–6)
HCT VFR BLD AUTO: 35.9 % (ref 35–47)
HDLC SERPL-MCNC: 36 MG/DL (ref 23–92)
HGB BLD-MCNC: 12 G/DL (ref 11.7–15.7)
LDLC SERPL CALC-MCNC: 52 MG/DL
MCH RBC QN AUTO: 33 PG (ref 26.5–33)
MCHC RBC AUTO-ENTMCNC: 33.4 G/DL (ref 31.5–36.5)
MCV RBC AUTO: 99 FL (ref 78–100)
NONHDLC SERPL-MCNC: 92 MG/DL
PLATELET # BLD AUTO: 142 10E9/L (ref 150–450)
POTASSIUM SERPL-SCNC: 5.3 MMOL/L (ref 3.5–5.1)
PROT SERPL-MCNC: 7.2 G/DL (ref 6.4–8.9)
RBC # BLD AUTO: 3.64 10E12/L (ref 3.8–5.2)
SODIUM SERPL-SCNC: 139 MMOL/L (ref 134–144)
TRIGL SERPL-MCNC: 200 MG/DL
TSH SERPL DL<=0.05 MIU/L-ACNC: 2.61 IU/ML (ref 0.34–5.6)
WBC # BLD AUTO: 6.1 10E9/L (ref 4–11)

## 2018-03-13 PROCEDURE — 80053 COMPREHEN METABOLIC PANEL: CPT | Performed by: INTERNAL MEDICINE

## 2018-03-13 PROCEDURE — 84443 ASSAY THYROID STIM HORMONE: CPT | Performed by: INTERNAL MEDICINE

## 2018-03-13 PROCEDURE — 83036 HEMOGLOBIN GLYCOSYLATED A1C: CPT | Performed by: INTERNAL MEDICINE

## 2018-03-13 PROCEDURE — 36415 COLL VENOUS BLD VENIPUNCTURE: CPT | Performed by: INTERNAL MEDICINE

## 2018-03-13 PROCEDURE — 80061 LIPID PANEL: CPT | Performed by: INTERNAL MEDICINE

## 2018-03-13 PROCEDURE — 85027 COMPLETE CBC AUTOMATED: CPT | Performed by: INTERNAL MEDICINE

## 2018-03-13 PROCEDURE — 99215 OFFICE O/P EST HI 40 MIN: CPT | Performed by: INTERNAL MEDICINE

## 2018-03-13 PROCEDURE — G0463 HOSPITAL OUTPT CLINIC VISIT: HCPCS

## 2018-03-13 RX ORDER — PRAZOSIN HYDROCHLORIDE 5 MG/1
CAPSULE ORAL
COMMUNITY
Start: 2018-03-13 | End: 2018-04-02

## 2018-03-13 ASSESSMENT — ENCOUNTER SYMPTOMS
CONSTIPATION: 0
ABDOMINAL DISTENTION: 0
EYE REDNESS: 0
FACIAL SWELLING: 0
TROUBLE SWALLOWING: 0
AGITATION: 0
SEIZURES: 0
NECK STIFFNESS: 0
SHORTNESS OF BREATH: 0
RHINORRHEA: 0
EYE PAIN: 0
NUMBNESS: 0
WHEEZING: 0
ABDOMINAL PAIN: 0
HEMATURIA: 0
FATIGUE: 0
HALLUCINATIONS: 0
DIZZINESS: 0
TREMORS: 0
DIFFICULTY URINATING: 0
FLANK PAIN: 0
BLOOD IN STOOL: 0
JOINT SWELLING: 0
SORE THROAT: 0
FREQUENCY: 0
SINUS PRESSURE: 0
DIAPHORESIS: 0
HEADACHES: 0
PALPITATIONS: 0
BRUISES/BLEEDS EASILY: 0
VOMITING: 0
DYSURIA: 0
CHILLS: 0
DIARRHEA: 0
COUGH: 0
SLEEP DISTURBANCE: 0
FEVER: 0
WEAKNESS: 0
BACK PAIN: 0
CHEST TIGHTNESS: 0
NECK PAIN: 1
NAUSEA: 0
CONFUSION: 0
SINUS PAIN: 0
ADENOPATHY: 0

## 2018-03-13 NOTE — LETTER
March 13, 2018      Lety Luna  [address]   [place] [address]        Dear Lety Luna,    Below are the results of your recent labs:    Results for orders placed or performed in visit on 03/13/18   Comprehensive metabolic panel (BMP + Alb, Alk Phos, ALT, AST, Total. Bili, TP)   Result Value Ref Range    Sodium 139 134 - 144 mmol/L    Potassium 5.3 (H) 3.5 - 5.1 mmol/L    Chloride 105 98 - 107 mmol/L    Carbon Dioxide 26 21 - 31 mmol/L    Anion Gap 8 3 - 14 mmol/L    Glucose 146 (H) 70 - 105 mg/dL    Urea Nitrogen 31 (H) 7 - 25 mg/dL    Creatinine 1.52 (H) 0.60 - 1.20 mg/dL    GFR Estimate 32 (L) >60 mL/min/1.7m2    GFR Estimate If Black 39 (L) >60 mL/min/1.7m2    Calcium 9.0 8.6 - 10.3 mg/dL    Bilirubin Total 0.4 0.3 - 1.0 mg/dL    Albumin 4.0 3.5 - 5.7 g/dL    Protein Total 7.2 6.4 - 8.9 g/dL    Alkaline Phosphatase 55 34 - 104 U/L    ALT 7 7 - 52 U/L    AST 12 (L) 13 - 39 U/L   Hemoglobin A1c   Result Value Ref Range    Hemoglobin A1C 6.1 (H) 4.0 - 6.0 %   Lipid Profile (Chol, Trig, HDL, LDL calc) - FASTING   Result Value Ref Range    Cholesterol 128 <200 mg/dL    Triglycerides 200 (H) <150 mg/dL    HDL Cholesterol 36 23 - 92 mg/dL    LDL Cholesterol Calculated 52 <100 mg/dL    Non HDL Cholesterol 92 <130 mg/dL   CBC with platelets   Result Value Ref Range    WBC 6.1 4.0 - 11.0 10e9/L    RBC Count 3.64 (L) 3.8 - 5.2 10e12/L    Hemoglobin 12.0 11.7 - 15.7 g/dL    Hematocrit 35.9 35.0 - 47.0 %    MCV 99 78 - 100 fl    MCH 33.0 26.5 - 33.0 pg    MCHC 33.4 31.5 - 36.5 g/dL    RDW 12.4 10.0 - 15.0 %    Platelet Count 142 (L) 150 - 450 10e9/L   TSH GH   Result Value Ref Range    Thyrotropin 2.61 0.34 - 5.60 IU/mL        Overall, your blood tests look fine.  This includes your diabetes test.  You do have a little bit of weakness of your kidneys which is remaining relatively stable.  Assuming all goes well, let us have you back in 4 months to recheck your diabetes and your  kidneys.    Sincerely,        Rodney Nelson MD  Internal Medicine  [facility]

## 2018-03-13 NOTE — PROGRESS NOTES
Chief Complaint:  This patient is here for a comprehensive review of their multiplemedical problems and review of medications, renewal of medications and update on necessary health maintenance issues.      HPI: She comes in today for complete evaluation and a review of her chronic medical problems.  She has a history of type 2 diabetes mellitus with historically good control and no complications.  She is on single agent therapy for this.  With her chronic kidney disease, we do need to monitor to see if she can remain on metformin safely.    She has a history of hypertension and is on multidrug therapy for control of this.  She has presumed hypertensive nephrosclerosis with chronic kidney disease as mentioned.  Her blood pressure is well controlled.  She is tolerating her medications without difficulty.  The last time she was in she had a little bit of peripheral edema and was given a prescription for furosemide to take on an as-needed basis.  She does not need to use this very often.    The patient has chronic atrial fibrillation associated with chronic GI bleeding.  She is not on anticoagulation due to the bleeding.  She takes iron twice daily to control her anemia.  She has previously declined GI evaluation.  She really does not have any complaints or concerns as a result of these issues or problems.  She has a history of some osteoarthritis with neck pain.  She has gout but this has not been problematic.    Medications are reconciled.  Past medical history, past surgical history, family history and social histories are reviewed and updated.  Immunizations are up-to-date.  She no longer gets a mammogram.    Past Medical History:   Diagnosis Date     Atrial fibrillation (H)     No anticoagulation due to GI bleed     Chronic kidney disease, stage III (moderate)     No Comments Provided     Developmental disorder of scholastic skills     No Comments Provided     Essential (primary) hypertension     No Comments  Provided     Gastrointestinal hemorrhage     2012,3 units pRBC     Gout     No Comments Provided     Hyperlipidemia     No Comments Provided     Obesity     No Comments Provided     Type 2 diabetes mellitus without complications (H)     No Comments Provided       Past Surgical History:   Procedure Laterality Date     APPENDECTOMY OPEN      No Comments Provided     DILATION AND CURETTAGE      No Comments Provided     EXTRACAPSULAR CATARACT EXTRATION WITH INTRAOCULAR LENS IMPLANT Bilateral     No Comments Provided     LAPAROSCOPIC TUBAL LIGATION      No Comments Provided       Current Outpatient Prescriptions   Medication Sig Dispense Refill     prazosin (MINIPRESS) 5 MG capsule 2 in morning, 1 in evening       captopril (CAPOTEN) 50 MG tablet Take 1 tablet (50 mg) by mouth 2 times daily 180 tablet 3     ferrous sulfate (IRON) 325 (65 FE) MG tablet Take 1 tablet (325 mg) by mouth 2 times daily (with meals) 100 tablet 3     metFORMIN (GLUCOPHAGE) 500 MG tablet Take 500 mg by mouth daily with food. 90 tablet 1     allopurinol (ZYLOPRIM) 100 MG tablet Take 100 mg by mouth daily       aspirin 81 MG tablet Take 81 mg by mouth daily with food       diltiazem 240 MG 24 hr capsule Take 240 mg by mouth daily       order for DME Urinary incontinence pad / panty liner       furosemide (LASIX) 20 MG tablet Take 1 tablet by mouth daily as needed for edema       miconazole (LOTRIMIN AF) 2 % powder        metoprolol succinate (TOPROL-XL) 200 MG 24 hr tablet Take 200 mg by mouth daily       omeprazole (PRILOSEC) 20 MG CR capsule Take 20 mg by mouth daily before a meal.       triamterene-hydrochlorothiazide (DYAZIDE) 37.5-25 MG per capsule Take 1 capsule by mouth every morning       [DISCONTINUED] prazosin (MINIPRESS) 5 MG capsule Take 2 capsules by mouth every evening         No Known Allergies    Family History   Problem Relation Age of Onset     Hypertension Brother      HEART DISEASE Brother      Other - See Comments Brother       intracerebral hemorrhage/ obesity     DIABETES Sister      DIABETES Sister      Other - See Comments Sister       from pneumonia     DIABETES Father      HEART DISEASE Father      DIABETES Mother      HEART DISEASE Mother      DIABETES Son      Diabetes     HEART DISEASE Brother        Social History     Social History     Marital status: Unknown     Spouse name: N/A     Number of children: N/A     Years of education: N/A     Occupational History     Not on file.     Social History Main Topics     Smoking status: Never Smoker     Smokeless tobacco: Never Used     Alcohol use No     Drug use: No      Comment: Drug use: No     Sexual activity: Not on file     Other Topics Concern     Not on file     Social History Narrative    Cigarettes-none.  Alcohol-none.   and lives in an apartment in town.       Review of Systems   Constitutional: Negative for chills, diaphoresis, fatigue and fever.   HENT: Negative for congestion, ear pain, facial swelling, mouth sores, nosebleeds, rhinorrhea, sinus pain, sinus pressure, sore throat and trouble swallowing.    Eyes: Negative for pain, redness and visual disturbance.   Respiratory: Negative for cough, chest tightness, shortness of breath and wheezing.    Cardiovascular: Negative for chest pain, palpitations and leg swelling.   Gastrointestinal: Negative for abdominal distention, abdominal pain, blood in stool, constipation, diarrhea, nausea and vomiting.   Endocrine: Negative for cold intolerance and heat intolerance.   Genitourinary: Negative for difficulty urinating, dysuria, flank pain, frequency, hematuria, pelvic pain, vaginal bleeding, vaginal discharge and vaginal pain.   Musculoskeletal: Positive for neck pain. Negative for back pain, gait problem, joint swelling and neck stiffness.   Skin: Negative for rash.   Neurological: Negative for dizziness, tremors, seizures, syncope, weakness, numbness and headaches.   Hematological: Negative for adenopathy. Does not  bruise/bleed easily.   Psychiatric/Behavioral: Negative for agitation, confusion, hallucinations and sleep disturbance.       Physical Exam   Constitutional: She is oriented to person, place, and time. She appears well-developed and well-nourished. No distress.   HENT:   Head: Normocephalic.   Right Ear: External ear normal.   Left Ear: External ear normal.   Mouth/Throat: Oropharynx is clear and moist. No oropharyngeal exudate.   Eyes: Conjunctivae are normal. Pupils are equal, round, and reactive to light.   Neck: Normal range of motion. Neck supple. Normal carotid pulses and no JVD present. Carotid bruit is not present. No tracheal deviation present. No thyromegaly present.   Cardiovascular: Normal rate and normal heart sounds.  An irregularly irregular rhythm present. Exam reveals no gallop and no friction rub.    No murmur heard.  Pulmonary/Chest: Effort normal and breath sounds normal. No respiratory distress. She has no wheezes. She has no rales. Right breast exhibits no inverted nipple, no mass, no nipple discharge, no skin change and no tenderness. Left breast exhibits no inverted nipple, no mass, no nipple discharge, no skin change and no tenderness.   Abdominal: Soft. Bowel sounds are normal. She exhibits no distension and no mass. There is no tenderness. There is no rebound.   Musculoskeletal: Normal range of motion. She exhibits no edema.   Thoracic kyphosis   Lymphadenopathy:     She has no cervical adenopathy.   Neurological: She is alert and oriented to person, place, and time. She has normal reflexes. No cranial nerve deficit. Coordination normal.   Skin: Skin is warm and dry. No rash noted. She is not diaphoretic.   Tinea present under her breasts   Psychiatric: She has a normal mood and affect. Her behavior is normal.   Nursing note and vitals reviewed.      Assessment:      ICD-10-CM    1. Chronic atrial fibrillation (H) I48.2    2. Cardiomyopathy, unspecified type (H) I42.9    3. Chronic kidney  disease, stage III (moderate) N18.3    4. Chronic anemia D64.9 CBC with platelets   5. Type 2 diabetes mellitus without complication, without long-term current use of insulin (H) E11.9 Comprehensive metabolic panel (BMP + Alb, Alk Phos, ALT, AST, Total. Bili, TP)     Hemoglobin A1c     Lipid Profile (Chol, Trig, HDL, LDL calc) - FASTING     TSH GH   6. Essential hypertension I10    7. Peripheral edema R60.9         Plan: In general, she appears to be doing very well.  Medications will continue without change.  Appropriate health screening measures are up-to-date at this time given her age.  Complete lab is drawn and pending, I will send her a letter with the results.  She will continue off anticoagulation due to her history of chronic GI bleeding.  She understands that she has an increased stroke risk because of this.  Follow-up with me will be dependent on the results of her lab, I will make recommendations to her when I send her a letter.  Symptomatic measures for the arthritis pains including her neck pain.

## 2018-03-13 NOTE — MR AVS SNAPSHOT
"              After Visit Summary   3/13/2018    Lety Luna    MRN: [id number]           Patient Information     Date Of Birth          7/24/1929        Visit Information        Provider Department      3/13/2018 8:00 AM Rodney Nelson MD [facility]        Today's Diagnoses     Chronic atrial fibrillation (H)    -  1    Cardiomyopathy, unspecified type (H)        Chronic kidney disease, stage III (moderate)        Chronic anemia        Type 2 diabetes mellitus without complication, without long-term current use of insulin (H)        Essential hypertension        Peripheral edema           Follow-ups after your visit        Who to contact     If you have questions or need follow up information about today's clinic visit or your schedule please contact [facility] AND [facility] directly at [phone].  Normal or non-critical lab and imaging results will be communicated to you by [employer], letter or phone within 4 business days after the clinic has received the results. If you do not hear from us within 7 days, please contact the clinic through [employer] or phone. If you have a critical or abnormal lab result, we will notify you by phone as soon as possible.  Submit refill requests through [employer] or call your pharmacy and they will forward the refill request to us. Please allow 3 business days for your refill to be completed.          Additional Information About Your Visit        [employer] Information     Numascale lets you send messages to your doctor, view your test results, renew your prescriptions, schedule appointments and more. To sign up, go to [email or web address] . Click on \"Log in\" on the left side of the screen, which will take you to the Welcome page. Then click on \"Sign up Now\" on the right side of the page.     You will be asked to enter the access code listed below, as well as some personal information. Please follow the directions to create your username and " password.     Your access code is: [email or web address]  Expires: 2018  8:27 AM     Your access code will  in 90 days. If you need help or a new code, please call your [place] clinic or [phone].        Care EveryWhere ID     This is your Care EveryWhere ID. This could be used by other organizations to access your [place] medical records  [id number]        Your Vitals Were     Pulse BMI (Body Mass Index)                88 35.9 kg/m2           Blood Pressure from Last 3 Encounters:   18 134/82   17 126/82   17 126/78    Weight from Last 3 Encounters:   18 190 lb (86.2 kg)   17 188 lb (85.3 kg)   17 186 lb 12.8 oz (84.7 kg)              We Performed the Following     CBC with platelets     Comprehensive metabolic panel (BMP + Alb, Alk Phos, ALT, AST, Total. Bili, TP)     Hemoglobin A1c     Lipid Profile (Chol, Trig, HDL, LDL calc) - FASTING     TSH GH          Today's Medication Changes          These changes are accurate as of 3/13/18  8:27 AM.  If you have any questions, ask your nurse or doctor.               These medicines have changed or have updated prescriptions.        Dose/Directions    prazosin 5 MG capsule   Commonly known as:  MINIPRESS   This may have changed:    - how much to take  - how to take this  - when to take this  - additional instructions   Changed by:  Rodney Nelson MD        2 in morning, 1 in evening   Refills:  0                Primary Care Provider Office Phone # Fax #    Rodney Nelson [phone] [address] [facility] [address]        Equal Access to Services     [facility] Hospital[facility]EL[facility] : Hadgrabiel Orozco, leo irving, philly anglin. So [facility] [phone].    ATENCIÓN: Si habla español, tiene a tamez disposición servicios gratuitos de asistencia lingüística. Llame al [phone].    We comply with applicable federal civil rights laws and [place]  laws. We do not discriminate on the basis of race, color, national origin, age, disability, sex, sexual orientation, or gender identity.            Thank you!     Thank you for choosing [facility] AND [facility]  for your care. Our goal is always to provide you with excellent care. Hearing back from our patients is one way we can continue to improve our services. Please take a few minutes to complete the written survey that you may receive in the mail after your visit with us. Thank you!             Your Updated Medication List - Protect others around you: Learn how to safely use, store and throw away your medicines at www.disposemymeds.org.          This list is accurate as of 3/13/18  8:27 AM.  Always use your most recent med list.                   Brand Name Dispense Instructions for use Diagnosis    allopurinol 100 MG tablet    ZYLOPRIM     Take 100 mg by mouth daily        aspirin 81 MG tablet      Take 81 mg by mouth daily with food        captopril 50 MG tablet    CAPOTEN    180 tablet    Take 1 tablet (50 mg) by mouth 2 times daily    Benign essential hypertension       diltiazem 240 MG 24 hr capsule      Take 240 mg by mouth daily        ferrous sulfate 325 (65 FE) MG tablet    IRON    100 tablet    Take 1 tablet (325 mg) by mouth 2 times daily (with meals)    Iron deficiency anemia, unspecified iron deficiency anemia type       furosemide 20 MG tablet    LASIX     Take 1 tablet by mouth daily as needed for edema        LOTRIMIN AF 2 % powder   Generic drug:  miconazole           metFORMIN 500 MG tablet    GLUCOPHAGE    90 tablet    Take 500 mg by mouth daily with food.    Type 2 diabetes mellitus without complication, without long-term current use of insulin (H)       metoprolol succinate 200 MG 24 hr tablet    TOPROL-XL     Take 200 mg by mouth daily        omeprazole 20 MG CR capsule    priLOSEC     Take 20 mg by mouth daily before a meal.        order for DME      Urinary incontinence pad /  panty liner        prazosin 5 MG capsule    MINIPRESS     2 in morning, 1 in evening        triamterene-hydrochlorothiazide 37.5-25 MG per capsule    DYAZIDE     Take 1 capsule by mouth every morning

## 2018-03-13 NOTE — NURSING NOTE
The patient is here today to be seen for a annual check up.  MELISSA VAZQUEZ LPN 3/13/2018 7:47 AM

## 2018-03-14 ASSESSMENT — PATIENT HEALTH QUESTIONNAIRE - PHQ9: SUM OF ALL RESPONSES TO PHQ QUESTIONS 1-9: 0

## 2018-03-15 DIAGNOSIS — M10.9 GOUT: ICD-10-CM

## 2018-03-15 DIAGNOSIS — I48.91 ATRIAL FIBRILLATION (H): ICD-10-CM

## 2018-03-15 DIAGNOSIS — I10 HYPERTENSION: Primary | ICD-10-CM

## 2018-03-15 DIAGNOSIS — K21.9 ESOPHAGEAL REFLUX: ICD-10-CM

## 2018-03-15 DIAGNOSIS — Z87.19 H/O: GI BLEED: ICD-10-CM

## 2018-03-19 RX ORDER — METOPROLOL SUCCINATE 200 MG/1
200 TABLET, EXTENDED RELEASE ORAL DAILY
Qty: 90 TABLET | Refills: 1 | Status: SHIPPED | OUTPATIENT
Start: 2018-03-19 | End: 2018-10-10

## 2018-03-19 RX ORDER — TRIAMTERENE AND HYDROCHLOROTHIAZIDE 37.5; 25 MG/1; MG/1
1 CAPSULE ORAL EVERY MORNING
Qty: 90 CAPSULE | Refills: 1 | Status: SHIPPED | OUTPATIENT
Start: 2018-03-19 | End: 2018-09-10

## 2018-03-19 RX ORDER — DILTIAZEM HYDROCHLORIDE 240 MG/1
240 CAPSULE, COATED, EXTENDED RELEASE ORAL DAILY
Qty: 90 CAPSULE | Refills: 1 | Status: SHIPPED | OUTPATIENT
Start: 2018-03-19 | End: 2018-09-17

## 2018-03-19 RX ORDER — ALLOPURINOL 100 MG/1
100 TABLET ORAL DAILY
Qty: 90 TABLET | Refills: 1 | Status: SHIPPED | OUTPATIENT
Start: 2018-03-19 | End: 2018-09-10

## 2018-03-19 NOTE — TELEPHONE ENCOUNTER
Prescription approved per [facility] Refill Protocol.  Patient seen on 3-13-18 with lab results showing abnormal creatinine. Labs reviewed by Rodney Nelson. No changes in medications. Patient to follow up in 4 months for recheck on kidney function.  Ellen Bravo RN on 3/19/2018 at 8:45 AM

## 2018-04-02 DIAGNOSIS — I10 ESSENTIAL HYPERTENSION: Primary | ICD-10-CM

## 2018-04-02 RX ORDER — PRAZOSIN HYDROCHLORIDE 5 MG/1
CAPSULE ORAL
Qty: 270 CAPSULE | Refills: 1 | Status: SHIPPED | OUTPATIENT
Start: 2018-04-02 | End: 2018-09-25

## 2018-04-02 NOTE — TELEPHONE ENCOUNTER
"Prescription approved per [facility] Refill Protocol. Medications reviewed by Rodney Nelson on 3-13-18 with no changes to hypertension medications; \"blood pressure is well controlled\"  Ellen Bravo RN on 4/2/2018 at 9:30 AM    "

## 2018-06-22 ENCOUNTER — TRANSFERRED RECORDS (OUTPATIENT)
Dept: HEALTH INFORMATION MANAGEMENT | Facility: OTHER | Age: 83
End: 2018-06-22

## 2018-07-12 DIAGNOSIS — B35.4 TINEA CORPORIS: Primary | ICD-10-CM

## 2018-07-16 NOTE — TELEPHONE ENCOUNTER
Routing refill request to provider for review/approval because:  Drug not on the [facility] refill protocol       LOV: 3/13/18    Cecy Gresham RN on 7/16/2018 at 4:27 PM

## 2018-07-18 ENCOUNTER — OFFICE VISIT (OUTPATIENT)
Dept: INTERNAL MEDICINE | Facility: OTHER | Age: 83
End: 2018-07-18
Attending: INTERNAL MEDICINE
Payer: MEDICARE

## 2018-07-18 ENCOUNTER — HOSPITAL ENCOUNTER (OUTPATIENT)
Dept: GENERAL RADIOLOGY | Facility: OTHER | Age: 83
Discharge: HOME | End: 2018-07-18
Attending: INTERNAL MEDICINE | Admitting: INTERNAL MEDICINE
Payer: MEDICARE

## 2018-07-18 VITALS
WEIGHT: 187 LBS | DIASTOLIC BLOOD PRESSURE: 78 MMHG | BODY MASS INDEX: 35.33 KG/M2 | HEART RATE: 68 BPM | SYSTOLIC BLOOD PRESSURE: 118 MMHG

## 2018-07-18 DIAGNOSIS — E11.9 TYPE 2 DIABETES MELLITUS WITHOUT COMPLICATION, WITHOUT LONG-TERM CURRENT USE OF INSULIN (H): Primary | ICD-10-CM

## 2018-07-18 DIAGNOSIS — M54.2 NECK PAIN: ICD-10-CM

## 2018-07-18 DIAGNOSIS — N18.30 CHRONIC KIDNEY DISEASE, STAGE III (MODERATE) (H): ICD-10-CM

## 2018-07-18 LAB
ANION GAP SERPL CALCULATED.3IONS-SCNC: 9 MMOL/L (ref 3–14)
BUN SERPL-MCNC: 29 MG/DL (ref 7–25)
CALCIUM SERPL-MCNC: 8.7 MG/DL (ref 8.6–10.3)
CHLORIDE SERPL-SCNC: 105 MMOL/L (ref 98–107)
CO2 SERPL-SCNC: 27 MMOL/L (ref 21–31)
CREAT SERPL-MCNC: 1.51 MG/DL (ref 0.6–1.2)
GFR SERPL CREATININE-BSD FRML MDRD: 33 ML/MIN/1.7M2
GLUCOSE SERPL-MCNC: 124 MG/DL (ref 70–105)
HBA1C MFR BLD: 6.2 % (ref 4–6)
POTASSIUM SERPL-SCNC: 5.1 MMOL/L (ref 3.5–5.1)
SODIUM SERPL-SCNC: 141 MMOL/L (ref 134–144)

## 2018-07-18 PROCEDURE — G0463 HOSPITAL OUTPT CLINIC VISIT: HCPCS

## 2018-07-18 PROCEDURE — 83036 HEMOGLOBIN GLYCOSYLATED A1C: CPT | Performed by: INTERNAL MEDICINE

## 2018-07-18 PROCEDURE — 99214 OFFICE O/P EST MOD 30 MIN: CPT | Performed by: INTERNAL MEDICINE

## 2018-07-18 PROCEDURE — 80048 BASIC METABOLIC PNL TOTAL CA: CPT | Performed by: INTERNAL MEDICINE

## 2018-07-18 PROCEDURE — 36415 COLL VENOUS BLD VENIPUNCTURE: CPT | Performed by: INTERNAL MEDICINE

## 2018-07-18 PROCEDURE — 72040 X-RAY EXAM NECK SPINE 2-3 VW: CPT

## 2018-07-18 ASSESSMENT — ENCOUNTER SYMPTOMS
HEMATOLOGIC/LYMPHATIC NEGATIVE: 1
CONSTITUTIONAL NEGATIVE: 1
ENDOCRINE NEGATIVE: 1
ALLERGIC/IMMUNOLOGIC NEGATIVE: 1

## 2018-07-18 NOTE — PROGRESS NOTES
Chief Complaint:  Here for f/u on DM, renal insufficiency and neck pain.    HPI: She comes in today for a follow-up on her diabetes.  She has well-controlled diabetes and takes a single dose of metformin daily.  This provides good control of her diabetes.  She does not have any complications or problems as a result of this.  She does have underlying chronic kidney disease and we are monitoring her GFR.  If this does worsen we may have to consider taking her off of the metformin.  She will need a recheck of her kidney tests today as well.    She is having some neck pain.  This is been bothering her off and on for a few months.  She would like to have that checked today.  She does not have any radicular symptoms as a result of this.  There was no injury that contributed to this.    Medications are reconciled, they remain unchanged.  She tells me that she does not need any refills today.    Past Medical History:   Diagnosis Date     Atrial fibrillation (H)     No anticoagulation due to GI bleed     Chronic kidney disease, stage III (moderate)     No Comments Provided     Developmental disorder of scholastic skills     No Comments Provided     Essential (primary) hypertension     No Comments Provided     Gastrointestinal hemorrhage     2012,3 units pRBC     Gout     No Comments Provided     Hyperlipidemia     No Comments Provided     Obesity     No Comments Provided     Type 2 diabetes mellitus without complications (H)     No Comments Provided       Past Surgical History:   Procedure Laterality Date     APPENDECTOMY OPEN      No Comments Provided     DILATION AND CURETTAGE      No Comments Provided     EXTRACAPSULAR CATARACT EXTRATION WITH INTRAOCULAR LENS IMPLANT Bilateral     No Comments Provided     LAPAROSCOPIC TUBAL LIGATION      No Comments Provided       No Known Allergies    Current Outpatient Prescriptions   Medication Sig Dispense Refill     allopurinol (ZYLOPRIM) 100 MG tablet Take 1 tablet (100 mg) by mouth  daily 90 tablet 1     aspirin 81 MG tablet Take 81 mg by mouth daily with food       captopril (CAPOTEN) 50 MG tablet Take 1 tablet (50 mg) by mouth 2 times daily 180 tablet 3     diltiazem 240 MG 24 hr capsule Take 1 capsule (240 mg) by mouth daily 90 capsule 1     ferrous sulfate (IRON) 325 (65 FE) MG tablet Take 1 tablet (325 mg) by mouth 2 times daily (with meals) 100 tablet 3     furosemide (LASIX) 20 MG tablet Take 1 tablet by mouth daily as needed for edema       LOTRIMIN AF 2 % powder APPLY TOPICALLY TO THE AFFECTED AREA TWICE DAILY IF NEEDED 90 g 3     metFORMIN (GLUCOPHAGE) 500 MG tablet Take 500 mg by mouth daily with food. 90 tablet 1     metoprolol succinate (TOPROL-XL) 200 MG 24 hr tablet Take 1 tablet (200 mg) by mouth daily 90 tablet 1     omeprazole (PRILOSEC) 20 MG CR capsule Take 1 capsule (20 mg) by mouth daily before a meal. 90 capsule 1     order for DME Urinary incontinence pad / panty liner       prazosin (MINIPRESS) 5 MG capsule Take 2 tablets in the morning and 1 tablet in the evening 270 capsule 1     triamterene-hydrochlorothiazide (DYAZIDE) 37.5-25 MG per capsule Take 1 capsule by mouth every morning 90 capsule 1       Social History     Social History     Marital status: Unknown     Spouse name: N/A     Number of children: N/A     Years of education: N/A     Occupational History     Not on file.     Social History Main Topics     Smoking status: Never Smoker     Smokeless tobacco: Never Used     Alcohol use No     Drug use: No      Comment: Drug use: No     Sexual activity: Not on file     Other Topics Concern     Not on file     Social History Narrative    Cigarettes-none.  Alcohol-none.   and lives in an apartment in [place] [place] [facility] [address].       Review of Systems   Constitutional: Negative.    Endocrine: Negative.    Skin: Negative.    Allergic/Immunologic: Negative.    Hematological: Negative.        Physical Exam   Constitutional: She appears well-developed and well-nourished. No distress.    Neck:   Mild to moderately reduced range of motion.  Subjective tenderness fairly diffusely in the posterior neck area.  Significant thoracic kyphosis   Cardiovascular: Normal rate.  An irregularly irregular rhythm present.   Pulmonary/Chest: Effort normal and breath sounds normal. No respiratory distress. She has no wheezes. She has no rales.   Musculoskeletal: She exhibits no edema.   Skin: She is not diaphoretic.   Nursing note and vitals reviewed.      Assessment:      ICD-10-CM    1. Type 2 diabetes mellitus without complication, without long-term current use of insulin (H) E11.9 Hemoglobin A1c     Hemoglobin A1c   2. Chronic kidney disease, stage III (moderate) N18.3 Basic Metabolic Panel     Basic Metabolic Panel   3. Neck pain M54.2 XR Cervical Spine 2/3 Views       Plan: Exam today is stable and unremarkable.  X-ray of her cervical spine showed a C6 compression fracture which is likely old as well as a moderate amount of arthritis.  Reviewed with the patient.  She was taking Advil and I told her she should not be taking that with her renal dysfunction.  She can take Tylenol and use ice or heat or a sports cream type rub on this area as needed.  Otherwise reassurance.  Lab is pending, I will send her letter with the results and any recommendations for change and follow-up.  No medication changes.

## 2018-07-18 NOTE — LETTER
July 18, 2018      Lety Luna  [address] [address]        Dear Lety Luna,    Below are the results of your recent labs:    Results for orders placed or performed in visit on 07/18/18   Hemoglobin A1c   Result Value Ref Range    Hemoglobin A1C 6.2 (H) 4.0 - 6.0 %   Basic Metabolic Panel   Result Value Ref Range    Sodium 141 134 - 144 mmol/L    Potassium 5.1 3.5 - 5.1 mmol/L    Chloride 105 98 - 107 mmol/L    Carbon Dioxide 27 21 - 31 mmol/L    Anion Gap 9 3 - 14 mmol/L    Glucose 124 (H) 70 - 105 mg/dL    Urea Nitrogen 29 (H) 7 - 25 mg/dL    Creatinine 1.51 (H) 0.60 - 1.20 mg/dL    GFR Estimate 33 (L) >60 mL/min/1.7m2    GFR Estimate If Black 39 (L) >60 mL/min/1.7m2    Calcium 8.7 8.6 - 10.3 mg/dL        Your blood tests look very good.  Congratulations on this report.  Continue with all of your current medications.  Assuming all goes well, return in 4 months.    Sincerely,        Rodney Nelson MD  Internal Medicine  [facility] and [facility]

## 2018-07-18 NOTE — MR AVS SNAPSHOT
After Visit Summary   7/18/2018    Lety Luna    MRN: [id number]           Patient Information     Date Of Birth          7/24/1929        Visit Information        Provider Department      7/18/2018 9:20 AM Rodney Nelson MD [facility]        Today's Diagnoses     Type 2 diabetes mellitus without complication, without long-term current use of insulin (H)    -  1    Chronic kidney disease, stage III (moderate)        Neck pain           Follow-ups after your visit        Future tests that were ordered for you today     Open Future Orders        Priority Expected Expires Ordered    XR Cervical Spine 2/3 Views Routine 7/18/2018 7/18/2019 7/18/2018            Who to contact     If you have questions or need follow up information about today's clinic visit or your schedule please contact [facility] directly at [phone].  Normal or non-critical lab and imaging results will be communicated to you by MyChart, letter or phone within 4 business days after the clinic has received the results. If you do not hear from us within 7 days, please contact the clinic through MyChart or phone. If you have a critical or abnormal lab result, we will notify you by phone as soon as possible.  Submit refill requests through [employer] or call your pharmacy and they will forward the refill request to us. Please allow 3 business days for your refill to be completed.          Additional Information About Your Visit        Care EveryWhere ID     This is your Care EveryWhere ID. This could be used by other organizations to access your [place] medical records  [id number]        Your Vitals Were     Pulse Breastfeeding? BMI (Body Mass Index)             68 No 35.33 kg/m2          Blood Pressure from Last 3 Encounters:   07/18/18 118/78   03/13/18 134/82   09/19/17 126/82    Weight from Last 3 Encounters:   07/18/18 187 lb (84.8 kg)   03/13/18 190 lb (86.2 kg)   09/19/17 188 lb  (85.3 kg)              We Performed the Following     Basic Metabolic Panel     Hemoglobin A1c        Primary Care Provider Office Phone # Fax #    Rodney Nelson [phone] [address]   [facility] [address]        Equal Access to Services     JOSEJASMINE BETH : Hadii aad ku haddanao Soomaali, waaxda luqadaha, qaybta kaalmada adeegyada, waxay danielin haypadminin edda rickelayne agudelo . So [facility] [phone].    ATENCIÓN: Si habla español, tiene a tamez disposición servicios gratuitos de asistencia lingüística. Llame al [phone].    We comply with applicable federal civil rights laws and [place] laws. We do not discriminate on the basis of race, color, national origin, age, disability, sex, sexual orientation, or gender identity.            Thank you!     Thank you for choosing [facility] AND [facility]  for your care. Our goal is always to provide you with excellent care. Hearing back from our patients is one way we can continue to improve our services. Please take a few minutes to complete the written survey that you may receive in the mail after your visit with us. Thank you!             Your Updated Medication List - Protect others around you: Learn how to safely use, store and throw away your medicines at www.disposemymeds.org.          This list is accurate as of 7/18/18  9:37 AM.  Always use your most recent med list.                   Brand Name Dispense Instructions for use Diagnosis    allopurinol 100 MG tablet    ZYLOPRIM    90 tablet    Take 1 tablet (100 mg) by mouth daily    Gout       aspirin 81 MG tablet      Take 81 mg by mouth daily with food        captopril 50 MG tablet    CAPOTEN    180 tablet    Take 1 tablet (50 mg) by mouth 2 times daily    Benign essential hypertension       diltiazem 240 MG 24 hr capsule     90 capsule    Take 1 capsule (240 mg) by mouth daily    Hypertension, Atrial fibrillation (H)       ferrous sulfate 325 (65 Fe) MG tablet    IRON    100  tablet    Take 1 tablet (325 mg) by mouth 2 times daily (with meals)    Iron deficiency anemia, unspecified iron deficiency anemia type       furosemide 20 MG tablet    LASIX     Take 1 tablet by mouth daily as needed for edema        LOTRIMIN AF 2 % powder   Generic drug:  miconazole     90 g    APPLY TOPICALLY TO THE AFFECTED AREA TWICE DAILY IF NEEDED    Tinea corporis       metFORMIN 500 MG tablet    GLUCOPHAGE    90 tablet    Take 500 mg by mouth daily with food.    Type 2 diabetes mellitus without complication, without long-term current use of insulin (H)       metoprolol succinate 200 MG 24 hr tablet    TOPROL-XL    90 tablet    Take 1 tablet (200 mg) by mouth daily    Hypertension       omeprazole 20 MG CR capsule    priLOSEC    90 capsule    Take 1 capsule (20 mg) by mouth daily before a meal.    H/O: GI bleed       order for DME      Urinary incontinence pad / panty liner        prazosin 5 MG capsule    MINIPRESS    270 capsule    Take 2 tablets in the morning and 1 tablet in the evening    Essential hypertension       triamterene-hydrochlorothiazide 37.5-25 MG per capsule    DYAZIDE    90 capsule    Take 1 capsule by mouth every morning    Hypertension

## 2018-07-19 ASSESSMENT — PATIENT HEALTH QUESTIONNAIRE - PHQ9: SUM OF ALL RESPONSES TO PHQ QUESTIONS 1-9: 0

## 2018-07-23 NOTE — PROGRESS NOTES
Patient Information     Patient Name  Lety Luna MRN  [id number] Sex  Female   1929      Letter by Rodney Nelson MD at      Author:  Rodney Nelson MD Service:  (none) Author Type:  (none)    Filed:   Encounter Date:  2017 Status:  (Other)           Lety Luna  [address]  [address] [facility] [address]          2017    Dear Ms. Luna:    Following are the tests completed during your last clinic visit:    Results for orders placed or performed in visit on 17      COMP METABOLIC PANEL      Result  Value Ref Range    SODIUM 140 133 - 143 mmol/L    POTASSIUM 5.0 3.5 - 5.1 mmol/L    CHLORIDE 104 98 - 107 mmol/L    CO2,TOTAL 25 21 - 31 mmol/L    ANION GAP 11 5 - 18                    GLUCOSE 122 (H) 70 - 105 mg/dL    CALCIUM 8.9 8.6 - 10.3 mg/dL    BUN 28 (H) 7 - 25 mg/dL    CREATININE 1.39 (H) 0.70 - 1.30 mg/dL    BUN/CREAT RATIO           20                    GFR if African American 43 (L) >60 ml/min/1.73m2    GFR if not  36 (L) >60 ml/min/1.73m2    ALBUMIN 3.8 3.5 - 5.7 g/dL    PROTEIN,TOTAL 7.2 6.4 - 8.9 g/dL    GLOBULIN                  3.4 2.0 - 3.7 g/dL    A/G RATIO 1.1 1.0 - 2.0                    BILIRUBIN,TOTAL 0.5 0.3 - 1.0 mg/dL    ALK PHOSPHATASE 48 34 - 104 IU/L    ALT (SGPT) 6 (L) 7 - 52 IU/L    AST (SGOT) 11 (L) 13 - 39 IU/L   LIPID PANEL      Result  Value Ref Range    CHOLESTEROL,TOTAL 122 <200 mg/dL    TRIGLYCERIDES 146 <150 mg/dL    HDL CHOLESTEROL 39 23 - 92 mg/dL    NON-HDL CHOLESTEROL 83 <145 mg/dl    CHOL/HDL RATIO            3.13 <4.50                    LDL CHOLESTEROL 54 <100 mg/dL    PATIENT STATUS            FASTING                   HEMOGLOBIN A1C MONITORING (POCT)      Result  Value Ref Range    HEMOGLOBIN A1C MONITORING (POCT) 5.8 4.0 - 6.2 %    ESTIMATED AVERAGE GLUCOSE  120 mg/dL   TSH      Result  Value Ref Range    TSH 1.69 0.34 - 5.60 uIU/mL   CBC WITH AUTO DIFFERENTIAL      Result  Value Ref Range    WHITE BLOOD  COUNT         6.2 4.5 - 11.0 thou/cu mm    RED BLOOD COUNT           3.59 (L) 4.00 - 5.20 mil/cu mm    HEMOGLOBIN                11.8 (L) 12.0 - 16.0 g/dL    HEMATOCRIT                35.1 33.0 - 51.0 %    MCV                       98 80 - 100 fL    MCH                       32.7 26.0 - 34.0 pg    MCHC                      33.5 32.0 - 36.0 g/dL    RDW                       13.2 11.5 - 15.5 %    PLATELET COUNT            168 140 - 440 thou/cu mm    MPV                       6.1 (L) 6.5 - 11.0 fL    NEUTROPHILS               62.6 42.0 - 72.0 %    LYMPHOCYTES               28.2 20.0 - 44.0 %    MONOCYTES                 6.2 <12.0 %    EOSINOPHILS               2.3 <8.0 %    BASOPHILS                 0.8 <3.0 %    ABSOLUTE NEUTROPHILS      3.9 1.7 - 7.0 thou/cu mm    ABSOLUTE LYMPHOCYTES      1.8 0.9 - 2.9 thou/cu mm    ABSOLUTE MONOCYTES        0.4 <0.9 thou/cu mm    ABSOLUTE EOSINOPHILS      0.1 <0.5 thou/cu mm    ABSOLUTE BASOPHILS        0.0 <0.3 thou/cu mm         Your blood tests look fantastic. Congratulations on this report. Continue with all of your current medications without change. If all goes well, we should recheck you again in 6 months.    Sincerely,      Rodney Nelson MD  Internal Medicine  [facility]

## 2018-07-24 NOTE — PROGRESS NOTES
Patient Information     Patient Name  Lety Luna MRN  [id number] Sex  Female   1929      Letter by Rodney Nelson MD at      Author:  Rodney Nelson MD Service:  (none) Author Type:  (none)    Filed:   Encounter Date:  2017 Status:  (Other)           Lety Luna  [address]  [address] [facility] [address]          2017    Dear Ms. Luna:    Following are the tests completed during your last clinic visit:    Results for orders placed or performed in visit on 17      HEMOGLOBIN A1C MONITORING (POCT)      Result  Value Ref Range    HEMOGLOBIN A1C MONITORING (POCT) 6.1 4.0 - 6.2 %    ESTIMATED AVERAGE GLUCOSE  128 mg/dL         Your diabetes test looks excellent. Congratulations on this result and keep up the good work.    Sincerely,      Rodney Nelson MD  Internal Medicine  [facility] and [facility]

## 2018-08-27 DIAGNOSIS — E11.9 TYPE 2 DIABETES MELLITUS WITHOUT COMPLICATION, WITHOUT LONG-TERM CURRENT USE OF INSULIN (H): ICD-10-CM

## 2018-08-30 NOTE — TELEPHONE ENCOUNTER
METFORMIN 500MG TABLETS      Prescription approved per [facility] Refill Protocol labs from 03/13/18 & 07/18/18, and Dr. Nelson's result letter on 07/18/18.  LOV: 07/18/19 Patient is to return in 4 months per Dr. Nelson's result letter.  Naila High RN on 8/30/2018 at 8:58 AM

## 2018-09-10 DIAGNOSIS — M1A.9XX0 CHRONIC GOUT WITHOUT TOPHUS, UNSPECIFIED CAUSE, UNSPECIFIED SITE: ICD-10-CM

## 2018-09-10 DIAGNOSIS — I10 ESSENTIAL HYPERTENSION: ICD-10-CM

## 2018-09-12 RX ORDER — TRIAMTERENE AND HYDROCHLOROTHIAZIDE 37.5; 25 MG/1; MG/1
CAPSULE ORAL
Qty: 90 CAPSULE | Refills: 3 | Status: SHIPPED | OUTPATIENT
Start: 2018-09-12 | End: 2019-08-20

## 2018-09-12 RX ORDER — ALLOPURINOL 100 MG/1
TABLET ORAL
Qty: 90 TABLET | Refills: 3 | Status: SHIPPED | OUTPATIENT
Start: 2018-09-12 | End: 2019-08-20

## 2018-09-12 NOTE — TELEPHONE ENCOUNTER
Routing refill request to provider for review/approval because:  Labs out of range:  creatinine  Labs not current:  Uric acid.    LOV; 7/18/18    Cecy Gresham RN on 9/12/2018 at 1:59 PM

## 2018-09-17 DIAGNOSIS — I10 HYPERTENSION: ICD-10-CM

## 2018-09-17 DIAGNOSIS — I48.91 ATRIAL FIBRILLATION (H): ICD-10-CM

## 2018-09-17 DIAGNOSIS — Z87.19 H/O: GI BLEED: ICD-10-CM

## 2018-09-17 DIAGNOSIS — D50.9 IRON DEFICIENCY ANEMIA, UNSPECIFIED IRON DEFICIENCY ANEMIA TYPE: ICD-10-CM

## 2018-09-19 RX ORDER — DILTIAZEM HYDROCHLORIDE 240 MG/1
CAPSULE, EXTENDED RELEASE ORAL
Qty: 90 CAPSULE | Refills: 3 | Status: SHIPPED | OUTPATIENT
Start: 2018-09-19 | End: 2019-09-14

## 2018-09-19 RX ORDER — FERROUS SULFATE 325(65) MG
TABLET ORAL
Qty: 100 TABLET | Refills: 5 | Status: SHIPPED | OUTPATIENT
Start: 2018-09-19 | End: 2019-03-22

## 2018-09-19 NOTE — TELEPHONE ENCOUNTER
"Prescription approved per [facility] Refill Protocol.  Per after visit letter by PCP, \"Your blood tests look very good, continue with.. medications.\"  Ellen Bravo RN on 9/19/2018 at 1:45 PM    "

## 2018-09-21 ENCOUNTER — OFFICE VISIT (OUTPATIENT)
Dept: FAMILY MEDICINE | Facility: OTHER | Age: 83
End: 2018-09-21
Attending: PHYSICAL MEDICINE & REHABILITATION
Payer: MEDICARE

## 2018-09-21 VITALS
HEART RATE: 60 BPM | TEMPERATURE: 97.4 F | SYSTOLIC BLOOD PRESSURE: 114 MMHG | WEIGHT: 188.5 LBS | DIASTOLIC BLOOD PRESSURE: 70 MMHG | HEIGHT: 61 IN | BODY MASS INDEX: 35.59 KG/M2

## 2018-09-21 DIAGNOSIS — M47.812 ARTHRITIS OF NECK: ICD-10-CM

## 2018-09-21 DIAGNOSIS — L08.9 INFECTED CYST OF SKIN: Primary | ICD-10-CM

## 2018-09-21 DIAGNOSIS — L72.9 INFECTED CYST OF SKIN: Primary | ICD-10-CM

## 2018-09-21 DIAGNOSIS — F03.90 DEMENTIA WITHOUT BEHAVIORAL DISTURBANCE, UNSPECIFIED DEMENTIA TYPE: ICD-10-CM

## 2018-09-21 DIAGNOSIS — F81.9 LEARNING DISABILITY: ICD-10-CM

## 2018-09-21 DIAGNOSIS — S12.9XXS COMPRESSION FRACTURE OF CERVICAL SPINE, SEQUELA: ICD-10-CM

## 2018-09-21 DIAGNOSIS — E11.9 TYPE 2 DIABETES MELLITUS WITHOUT COMPLICATION, WITHOUT LONG-TERM CURRENT USE OF INSULIN (H): ICD-10-CM

## 2018-09-21 PROCEDURE — 10060 I&D ABSCESS SIMPLE/SINGLE: CPT | Mod: GZ | Performed by: FAMILY MEDICINE

## 2018-09-21 PROCEDURE — 99214 OFFICE O/P EST MOD 30 MIN: CPT | Mod: 25 | Performed by: FAMILY MEDICINE

## 2018-09-21 PROCEDURE — G0463 HOSPITAL OUTPT CLINIC VISIT: HCPCS

## 2018-09-21 PROCEDURE — 87070 CULTURE OTHR SPECIMN AEROBIC: CPT | Performed by: FAMILY MEDICINE

## 2018-09-21 PROCEDURE — G0463 HOSPITAL OUTPT CLINIC VISIT: HCPCS | Mod: 25

## 2018-09-21 RX ORDER — CEPHALEXIN 500 MG/1
500 CAPSULE ORAL 3 TIMES DAILY
Qty: 21 CAPSULE | Refills: 0 | Status: SHIPPED | OUTPATIENT
Start: 2018-09-21 | End: 2019-03-22

## 2018-09-21 ASSESSMENT — PAIN SCALES - GENERAL: PAINLEVEL: MODERATE PAIN (5)

## 2018-09-21 NOTE — PATIENT INSTRUCTIONS
They now make heat patches and novocaine patches for pain.      Antibiotics for a week.  Take bandage off tomorrow and keep a bandaid on until healed.

## 2018-09-21 NOTE — NURSING NOTE
Patient having right sided neck pain/stiffness.  Hurts to move her head.    Previous A1C is at goal of <8  Lab Results   Component Value Date    A1C 6.2 07/18/2018    A1C 6.1 03/13/2018     Urine microalbumin:creatine: DUE  Foot exam DUE  Eye exam 6/2018    Tobacco User No  Patient is on a daily aspirin  Patient is not on a Statin.  Blood pressure today of:     BP Readings from Last 1 Encounters:   09/21/18 114/70      is at the goal of <139/89 for diabetics.    Norma J. Gosselin, LPN on 9/21/2018 at 8:58 AM

## 2018-09-21 NOTE — MR AVS SNAPSHOT
"              After Visit Summary   9/21/2018    Leyt Luna    MRN: [id number]           Patient Information     Date Of Birth          7/24/1929        Visit Information        Provider Department      9/21/2018 9:15 AM Sania Bae MD [facility]        Today's Diagnoses     Infected cyst of skin    -  1    Compression fracture of cervical spine, sequela        Arthritis of neck (H)        Learning disability        Dementia without behavioral disturbance, unspecified dementia type        Type 2 diabetes mellitus without complication, without long-term current use of insulin (H)          Care Instructions    They now make heat patches and novocaine patches for pain.      Antibiotics for a week.  Take bandage off tomorrow and keep a bandaid on until healed.            Follow-ups after your visit        Who to contact     If you have questions or need follow up information about today's clinic visit or your schedule please contact [facility] directly at [phone].  Normal or non-critical lab and imaging results will be communicated to you by MyChart, letter or phone within 4 business days after the clinic has received the results. If you do not hear from us within 7 days, please contact the clinic through MyChart or phone. If you have a critical or abnormal lab result, we will notify you by phone as soon as possible.  Submit refill requests through EverPower or call your pharmacy and they will forward the refill request to us. Please allow 3 business days for your refill to be completed.          Additional Information About Your Visit        Care EveryWhere ID     This is your Care EveryWhere ID. This could be used by other organizations to access your [place] medical records  [id number]        Your Vitals Were     Pulse Temperature Height BMI (Body Mass Index)          60 97.4  F (36.3  C) (Temporal) 5' 1\" (1.549 m) 35.62 kg/m2         Blood " Pressure from Last 3 Encounters:   09/21/18 114/70   07/18/18 118/78   03/13/18 134/82    Weight from Last 3 Encounters:   09/21/18 188 lb 8 oz (85.5 kg)   07/18/18 187 lb (84.8 kg)   03/13/18 190 lb (86.2 kg)              We Performed the Following     DRAIN SKIN ABSCESS SIMPLE/SINGLE     MISC Bacterial Culture          Today's Medication Changes          These changes are accurate as of 9/21/18 11:59 PM.  If you have any questions, ask your nurse or doctor.               Start taking these medicines.        Dose/Directions    cephALEXin 500 MG capsule   Commonly known as:  KEFLEX   Used for:  Infected cyst of skin   Started by:  Sania Bae MD        Dose:  500 mg   Take 1 capsule (500 mg) by mouth 3 times daily for 7 days   Quantity:  21 capsule   Refills:  0            Where to get your medicines      These medications were sent to [id number] - [place], [place] - [address], [facility] [address]     Phone:  [phone]     cephALEXin 500 MG capsule                Primary Care Provider Office Phone # Fax #    Rodney Nelson [phone] [address] [facility] [address]        Equal Access to Services     SILVIA CAMPOS AH: Hadii aad ku hadasho Soomaali, waaxda luqadaha, qaybta kaalmada adeegyada, philly jameson. So [facility] [phone].    ATENCIÓN: Si habla español, tiene a tamez disposición servicios gratuitos de asistencia lingüística. Llame al [phone].    We comply with applicable federal civil rights laws and [place] laws. We do not discriminate on the basis of race, color, national origin, age, disability, sex, sexual orientation, or gender identity.            Thank you!     Thank you for choosing [facility]  for your care. Our goal is always to provide you with excellent care. Hearing back from our patients is one way we can continue to improve  our services. Please take a few minutes to complete the written survey that you may receive in the mail after your visit with us. Thank you!             Your Updated Medication List - Protect others around you: Learn how to safely use, store and throw away your medicines at www.disposemymeds.org.          This list is accurate as of 9/21/18 11:59 PM.  Always use your most recent med list.                   Brand Name Dispense Instructions for use Diagnosis    allopurinol 100 MG tablet    ZYLOPRIM    90 tablet    TAKE 1 TABLET(100 MG) BY MOUTH DAILY    Chronic gout without tophus, unspecified cause, unspecified site       aspirin 81 MG tablet      Take 81 mg by mouth daily with food        captopril 50 MG tablet    CAPOTEN    180 tablet    Take 1 tablet (50 mg) by mouth 2 times daily    Benign essential hypertension       CARTIA  MG 24 hr capsule   Generic drug:  diltiazem     90 capsule    TAKE 1 CAPSULE(240 MG) BY MOUTH DAILY    Hypertension, Atrial fibrillation (H)       cephALEXin 500 MG capsule    KEFLEX    21 capsule    Take 1 capsule (500 mg) by mouth 3 times daily for 7 days    Infected cyst of skin       FEROSUL 325 (65 Fe) MG tablet   Generic drug:  ferrous sulfate     100 tablet    TAKE 1 TABLET(325 MG) BY MOUTH TWICE DAILY WITH MEALS    Iron deficiency anemia, unspecified iron deficiency anemia type       furosemide 20 MG tablet    LASIX     Take 1 tablet by mouth daily as needed for edema        LOTRIMIN AF 2 % powder   Generic drug:  miconazole     90 g    APPLY TOPICALLY TO THE AFFECTED AREA TWICE DAILY IF NEEDED    Tinea corporis       metFORMIN 500 MG tablet    GLUCOPHAGE    90 tablet    TAKE 1 TABLET BY MOUTH DAILY WITH FOOD    Type 2 diabetes mellitus without complication, without long-term current use of insulin (H)       metoprolol succinate 200 MG 24 hr tablet    TOPROL-XL    90 tablet    Take 1 tablet (200 mg) by mouth daily    Hypertension       omeprazole 20 MG CR capsule    priLOSEC     90 capsule    TAKE 1 CAPSULE(20 MG) BY MOUTH DAILY BEFORE A MEAL    H/O: GI bleed       order for DME      Urinary incontinence pad / panty liner        prazosin 5 MG capsule    MINIPRESS    270 capsule    Take 2 tablets in the morning and 1 tablet in the evening    Essential hypertension       triamterene-hydrochlorothiazide 37.5-25 MG per capsule    DYAZIDE    90 capsule    TAKE 1 CAPSULE BY MOUTH EVERY MORNING    Essential hypertension

## 2018-09-21 NOTE — PROGRESS NOTES
Nursing Notes:   Gosselin, Norma J., LPN  9/21/2018  9:23 AM  Signed  Patient having right sided neck pain/stiffness.  Hurts to move her head.    Previous A1C is at goal of <8  Lab Results   Component Value Date    A1C 6.2 07/18/2018    A1C 6.1 03/13/2018     Urine microalbumin:creatine: DUE  Foot exam DUE  Eye exam 6/2018    Tobacco User No  Patient is on a daily aspirin  Patient is not on a Statin.  Blood pressure today of:     BP Readings from Last 1 Encounters:   09/21/18 114/70      is at the goal of <139/89 for diabetics.    Norma J. Gosselin, LPN on 9/21/2018 at 8:58 AM          SUBJECTIVE:   CC:  Lety Luna is a 89 year old female who presents to clinic today for the following health issues:  Neck pain    HPI  Lety Luna is a 89 year old female in with his daughter for evaluation of neck pain.  Onset about 3 months ago.    She's been to Dr Hennessy for ultrasound treatments - this helped for a short while.    She is taking Tylenol and using a biofreeze rub on it.    No fall/trauma.  There are certain positions that make it worse too.    She had x-ray done this summer when she saw her PCP.    She has type 2 diabetes.  A1C within goal.   Is on metformin for this.     No Known Allergies  Current Outpatient Prescriptions   Medication     allopurinol (ZYLOPRIM) 100 MG tablet     aspirin 81 MG tablet     captopril (CAPOTEN) 50 MG tablet     CARTIA  MG 24 hr capsule     cephALEXin (KEFLEX) 500 MG capsule     FEROSUL 325 (65 Fe) MG tablet     furosemide (LASIX) 20 MG tablet     LOTRIMIN AF 2 % powder     metFORMIN (GLUCOPHAGE) 500 MG tablet     metoprolol succinate (TOPROL-XL) 200 MG 24 hr tablet     omeprazole (PRILOSEC) 20 MG CR capsule     order for DME     prazosin (MINIPRESS) 5 MG capsule     triamterene-hydrochlorothiazide (DYAZIDE) 37.5-25 MG per capsule     No current facility-administered medications for this visit.       Past Medical History:   Diagnosis Date     Atrial fibrillation (H)     No  "anticoagulation due to GI bleed     Chronic kidney disease, stage III (moderate)     No Comments Provided     Developmental disorder of scholastic skills     No Comments Provided     Essential (primary) hypertension     No Comments Provided     Gastrointestinal hemorrhage     2012,3 units pRBC     Gout     No Comments Provided     Hyperlipidemia     No Comments Provided     Obesity     No Comments Provided     Type 2 diabetes mellitus without complications (H)     No Comments Provided      Past Surgical History:   Procedure Laterality Date     APPENDECTOMY OPEN      No Comments Provided     DILATION AND CURETTAGE      No Comments Provided     EXTRACAPSULAR CATARACT EXTRATION WITH INTRAOCULAR LENS IMPLANT Bilateral     No Comments Provided     LAPAROSCOPIC TUBAL LIGATION      No Comments Provided       Review of Systems   Constitutional: Negative.    Respiratory: Negative.    Cardiovascular: Negative.    Musculoskeletal: Positive for arthralgias, back pain, myalgias, neck pain and neck stiffness.   Skin:        In discussing musculoskeletal and skin concerns - I found a right forearm cyst with erythema. Patient states she just noticed it - daughter wasn't aware        PHQ-2 Score:     PHQ-2 ( 1999 Pf[employer]) 9/21/2018   Q1: Little interest or pleasure in doing things 0   Q2: Feeling down, depressed or hopeless 0   PHQ-2 Score 0         PHQ-9 SCORE 9/19/2017 3/13/2018 7/18/2018   Total Score 0 0 0         OBJECTIVE:     /70  Pulse 60  Temp 97.4  F (36.3  C) (Temporal)  Ht 5' 1\" (1.549 m)  Wt 188 lb 8 oz (85.5 kg)  BMI 35.62 kg/m2  Body mass index is 35.62 kg/(m^2).  Physical Exam   Constitutional: She appears well-developed and well-nourished.   Musculoskeletal:   Decreased neck ROM, especially with extension and rotation   Skin:   Right forearm with 2cm raised tender nodule, moderate erythema.  Consent obtained for treatment.  Skin prepped with chloroprep.  I&D performed with removal of moderate amt of " purulent matter and sebaceous matter removed. Culture obtained too.    Psychiatric:   Memory concerns, repeats some questions   Nursing note and vitals reviewed.       Results for orders placed or performed in visit on 09/21/18   MISC Bacterial Culture   Result Value Ref Range    Specimen Description Right Arm     Culture Micro Moderate growth  Staphylococcus aureus   (A)        Susceptibility    Staphylococcus aureus - BONY     CLINDAMYCIN <=0.5 Sensitive ug/mL     ERYTHROMYCIN <=0.5 Sensitive ug/mL     OXACILLIN <=0.25 Sensitive ug/mL     PENICILLIN <=0.03 Sensitive ug/mL     RIFAMPIN <=1 Sensitive ug/mL     TETRACYCLINE <=4 Sensitive ug/mL     Trimethoprim/Sulfa <=2/38 Sensitive ug/mL     VANCOMYCIN <=2 Sensitive ug/mL     Azithromycin <=2 Sensitive ug/mL     LINEZOLID <=2 Sensitive ug/mL     CEFEPIME <=8 Sensitive ug/mL         ASSESSMENT/PLAN:       ICD-10-CM    1. Infected cyst of skin L72.9 MISC Bacterial Culture    L08.9 cephALEXin (KEFLEX) 500 MG capsule     DRAIN SKIN ABSCESS SIMPLE/SINGLE   2. Compression fracture of cervical spine, sequela S12.9XXS    3. Arthritis of neck (H) M46.92    4. Learning disability F81.9    5. Dementia without behavioral disturbance, unspecified dementia type F03.90    6. Type 2 diabetes mellitus without complication, without long-term current use of insulin (H) E11.9             PLAN:  1.  Follow up I&D treatment discussed with patient's daughter.  She is started on keflex tid for 10 days and medication side effects discussed.  Follow up if not improving.  2.  Discussed that previous x-ray shows arthritis and compression fracture.  That is cause of ongoing neck pain.  Treatment that they are doing is appropriate.  3.  Follow up next month with PCP for diabetes and general follow up.        MARY ALICE LAWS MD  [facility]    This note was created using voice recognition software and was screened for errors in transcription.

## 2018-09-23 LAB
BACTERIA SPEC CULT: ABNORMAL
SPECIMEN SOURCE: ABNORMAL

## 2018-09-25 DIAGNOSIS — I10 ESSENTIAL HYPERTENSION: ICD-10-CM

## 2018-09-26 ASSESSMENT — ENCOUNTER SYMPTOMS
MYALGIAS: 1
CARDIOVASCULAR NEGATIVE: 1
ARTHRALGIAS: 1
NECK PAIN: 1
NECK STIFFNESS: 1
BACK PAIN: 1
CONSTITUTIONAL NEGATIVE: 1
RESPIRATORY NEGATIVE: 1

## 2018-09-28 RX ORDER — PRAZOSIN HYDROCHLORIDE 5 MG/1
CAPSULE ORAL
Qty: 270 CAPSULE | Refills: 3 | Status: SHIPPED | OUTPATIENT
Start: 2018-09-28 | End: 2019-09-12

## 2018-09-28 NOTE — TELEPHONE ENCOUNTER
[facility] pharmacy and pt request a Rx refill for prazosin (Minipress).  Alpha Blockers protocol passed.  Pt's last exam with PCP Dr. AMIE Nelson was on 7-8-18.  Prescription approved per [facility].  Ellen Mcdaniel RN on 9/28/2018 at 8:09 AM

## 2018-10-03 ENCOUNTER — ALLIED HEALTH/NURSE VISIT (OUTPATIENT)
Dept: FAMILY MEDICINE | Facility: OTHER | Age: 83
End: 2018-10-03
Attending: INTERNAL MEDICINE
Payer: MEDICARE

## 2018-10-03 DIAGNOSIS — Z23 NEED FOR PROPHYLACTIC VACCINATION AND INOCULATION AGAINST INFLUENZA: Primary | ICD-10-CM

## 2018-10-03 PROCEDURE — 90662 IIV NO PRSV INCREASED AG IM: CPT

## 2018-10-03 PROCEDURE — G0008 ADMIN INFLUENZA VIRUS VAC: HCPCS

## 2018-10-03 NOTE — MR AVS SNAPSHOT
After Visit Summary   10/3/2018    Lety Luna    MRN: [id number]           Patient Information     Date Of Birth          7/24/1929        Visit Information        Provider Department      10/3/2018 9:30 AM  [facility]        Today's Diagnoses     Need for prophylactic vaccination and inoculation against influenza    -  1       Follow-ups after your visit        Your next 10 appointments already scheduled     Oct 03, 2018  9:30 AM CDT   Nurse Only with [facility] ([facility])    [address]  [place] [address] [phone]              Who to contact     If you have questions or need follow up information about today's clinic visit or your schedule please contact [facility] directly at [phone].  Normal or non-critical lab and imaging results will be communicated to you by MyChart, letter or phone within 4 business days after the clinic has received the results. If you do not hear from us within 7 days, please contact the clinic through MyChart or phone. If you have a critical or abnormal lab result, we will notify you by phone as soon as possible.  Submit refill requests through GoInstant or call your pharmacy and they will forward the refill request to us. Please allow 3 business days for your refill to be completed.          Additional Information About Your Visit        Care EveryWhere ID     This is your Care EveryWhere ID. This could be used by other organizations to access your [place] medical records  [id number]         Blood Pressure from Last 3 Encounters:   09/21/18 114/70   07/18/18 118/78   03/13/18 134/82    Weight from Last 3 Encounters:   09/21/18 188 lb 8 oz (85.5 kg)   07/18/18 187 lb (84.8 kg)   03/13/18 190 lb (86.2 kg)              We Performed the Following     HC FLU VACCINE, INCREASED ANTIGEN, PRESV FREE        Primary Care Provider Office Phone # Fat  #    Rodney Nelson [phone]       [address] [facility] [address]        Equal Access to Services     SILVIA CAMPOS : Hadii aad ku haddanasarina Orozco, jenaronirmala martinezlachoha, alyson mellissamaya sanjayliang, waxmarshall blu stanmariana gracestanton ivanangelicaelayne jameson. So [facility] [phone].    ATENCIÓN: Si habla español, tiene a tamez disposición servicios gratuitos de asistencia lingüística. Llame al [phone].    We comply with applicable federal civil rights laws and [place] laws. We do not discriminate on the basis of race, color, national origin, age, disability, sex, sexual orientation, or gender identity.            Thank you!     Thank you for choosing [facility]  for your care. Our goal is always to provide you with excellent care. Hearing back from our patients is one way we can continue to improve our services. Please take a few minutes to complete the written survey that you may receive in the mail after your visit with us. Thank you!             Your Updated Medication List - Protect others around you: Learn how to safely use, store and throw away your medicines at www.disposemymeds.org.          This list is accurate as of 10/3/18  9:11 AM.  Always use your most recent med list.                   Brand Name Dispense Instructions for use Diagnosis    allopurinol 100 MG tablet    ZYLOPRIM    90 tablet    TAKE 1 TABLET(100 MG) BY MOUTH DAILY    Chronic gout without tophus, unspecified cause, unspecified site       aspirin 81 MG tablet      Take 81 mg by mouth daily with food        captopril 50 MG tablet    CAPOTEN    180 tablet    Take 1 tablet (50 mg) by mouth 2 times daily    Benign essential hypertension       CARTIA  MG 24 hr capsule   Generic drug:  diltiazem     90 capsule    TAKE 1 CAPSULE(240 MG) BY MOUTH DAILY    Hypertension, Atrial fibrillation (H)       FEROSUL 325 (65 Fe) MG tablet   Generic drug:  ferrous sulfate     100 tablet    TAKE 1 TABLET(325 MG) BY MOUTH  TWICE DAILY WITH MEALS    Iron deficiency anemia, unspecified iron deficiency anemia type       furosemide 20 MG tablet    LASIX     Take 1 tablet by mouth daily as needed for edema        LOTRIMIN AF 2 % powder   Generic drug:  miconazole     90 g    APPLY TOPICALLY TO THE AFFECTED AREA TWICE DAILY IF NEEDED    Tinea corporis       metFORMIN 500 MG tablet    GLUCOPHAGE    90 tablet    TAKE 1 TABLET BY MOUTH DAILY WITH FOOD    Type 2 diabetes mellitus without complication, without long-term current use of insulin (H)       metoprolol succinate 200 MG 24 hr tablet    TOPROL-XL    90 tablet    Take 1 tablet (200 mg) by mouth daily    Hypertension       omeprazole 20 MG CR capsule    priLOSEC    90 capsule    TAKE 1 CAPSULE(20 MG) BY MOUTH DAILY BEFORE A MEAL    H/O: GI bleed       order for DME      Urinary incontinence pad / panty liner        prazosin 5 MG capsule    MINIPRESS    270 capsule    TAKE 2 CAPSULES BY MOUTH EVERY MORNING AND 1 CAPSULE IN THE EVENING    Essential hypertension       triamterene-hydrochlorothiazide 37.5-25 MG per capsule    DYAZIDE    90 capsule    TAKE 1 CAPSULE BY MOUTH EVERY MORNING    Essential hypertension

## 2018-10-03 NOTE — NURSING NOTE
Prior to injection verified patient identity using patient's name and date of birth.  Due to injection administration, patient instructed to remain in clinic for 15 minutes  afterwards, and to report any adverse reaction to me immediately.    Whit Brooks LPN............. October 3, 2018 9:19 AM

## 2018-10-03 NOTE — PROGRESS NOTES
Injectable Influenza Immunization Documentation    1.  Is the person to be vaccinated sick today?   No    2. Does the person to be vaccinated have an allergy to a component   of the vaccine?   No  Egg Allergy Algorithm Link    3. Has the person to be vaccinated ever had a serious reaction   to influenza vaccine in the past?   No    4. Has the person to be vaccinated ever had Guillain-Barré syndrome?   No    Form completed by Whit Brooks LPN............. October 3, 2018 9:07 AM

## 2018-10-10 DIAGNOSIS — I10 HYPERTENSION: ICD-10-CM

## 2018-10-12 RX ORDER — METOPROLOL SUCCINATE 200 MG/1
TABLET, EXTENDED RELEASE ORAL
Qty: 90 TABLET | Refills: 1 | Status: SHIPPED | OUTPATIENT
Start: 2018-10-12 | End: 2019-03-31

## 2018-10-12 NOTE — TELEPHONE ENCOUNTER
[facility] pharmacy and pt request a Rx refill for metoprolol ER succinate (Toprol XL).  Pt's last exam with PCP Dr. AMIE Nelson was on 7-18-18.  Beta-Blockers Protocol Passed.  Prescription approved per [facility] [place].  Ellen Mcdaniel RN on 10/12/2018 at 2:49 PM

## 2018-10-17 ENCOUNTER — OFFICE VISIT (OUTPATIENT)
Dept: FAMILY MEDICINE | Facility: OTHER | Age: 83
End: 2018-10-17
Attending: PHYSICIAN ASSISTANT
Payer: MEDICARE

## 2018-10-17 VITALS
WEIGHT: 189 LBS | BODY MASS INDEX: 35.71 KG/M2 | HEART RATE: 60 BPM | RESPIRATION RATE: 18 BRPM | SYSTOLIC BLOOD PRESSURE: 110 MMHG | DIASTOLIC BLOOD PRESSURE: 70 MMHG

## 2018-10-17 DIAGNOSIS — N61.0 CELLULITIS OF FEMALE BREAST: Primary | ICD-10-CM

## 2018-10-17 DIAGNOSIS — B49 FUNGAL INFECTION: ICD-10-CM

## 2018-10-17 PROCEDURE — 87070 CULTURE OTHR SPECIMN AEROBIC: CPT | Performed by: PHYSICIAN ASSISTANT

## 2018-10-17 PROCEDURE — G0463 HOSPITAL OUTPT CLINIC VISIT: HCPCS

## 2018-10-17 PROCEDURE — 99213 OFFICE O/P EST LOW 20 MIN: CPT | Performed by: PHYSICIAN ASSISTANT

## 2018-10-17 RX ORDER — CLOTRIMAZOLE 1 %
CREAM (GRAM) TOPICAL 2 TIMES DAILY
Qty: 60 G | Refills: 11 | Status: SHIPPED | OUTPATIENT
Start: 2018-10-17 | End: 2018-11-02

## 2018-10-17 RX ORDER — CEPHALEXIN 500 MG/1
500 CAPSULE ORAL 2 TIMES DAILY
Qty: 20 CAPSULE | Refills: 0 | Status: SHIPPED | OUTPATIENT
Start: 2018-10-17 | End: 2019-03-22

## 2018-10-17 NOTE — MR AVS SNAPSHOT
After Visit Summary   10/17/2018    Lety Luna    MRN: [id number]           Patient Information     Date Of Birth          7/24/1929        Visit Information        Provider Department      10/17/2018 9:00 AM Prachi Vargas PA-C [facility]        Today's Diagnoses     Cellulitis of female breast    -  1    Fungal infection          Care Instructions    Started on keflex for a skin infection.   Take the antibiotic as prescribed. Antibiotic has been sent to pharmacy. Please take full course of the antibiotic even if symptoms have completely resolved. This helps prevent against antibiotic resistance.     Watch for any signs of increasing infection (redness, pus, increased pain (may be along the tendon and back of hand if the hand is involved), increased swelling or fever). Call if any of these signs occur. Monitor for fevers or chills.    You may draw a line around the area of redness to monitor the area. Please return to clinic if redness is continuing to spread after 2-3 days.    Call or return to clinic as needed if your symptoms worsen or fail to improve as anticipated.      Use clotrimazole cream twice daily for 2-4 weeks.   Keep area clean and dry.   Wash a few times daily with warm soapy water.   Return to clinic with change/worsening of symptoms.           Follow-ups after your visit        Follow-up notes from your care team     Return if symptoms worsen or fail to improve.      Who to contact     If you have questions or need follow up information about today's clinic visit or your schedule please contact [facility] directly at [phone].  Normal or non-critical lab and imaging results will be communicated to you by MyChart, letter or phone within 4 business days after the clinic has received the results. If you do not hear from us within 7 days, please contact the clinic through MyChart or phone. If you have a critical or abnormal lab  result, we will notify you by phone as soon as possible.  Submit refill requests through [employer] or call your pharmacy and they will forward the refill request to us. Please allow 3 business days for your refill to be completed.          Additional Information About Your Visit        Care EveryWhere ID     This is your Care EveryWhere ID. This could be used by other organizations to access your [place] medical records  [id number]        Your Vitals Were     Pulse Respirations BMI (Body Mass Index)             60 18 35.71 kg/m2          Blood Pressure from Last 3 Encounters:   10/17/18 110/70   09/21/18 114/70   07/18/18 118/78    Weight from Last 3 Encounters:   10/17/18 189 lb (85.7 kg)   09/21/18 188 lb 8 oz (85.5 kg)   07/18/18 187 lb (84.8 kg)              We Performed the Following     Wound Culture          Today's Medication Changes          These changes are accurate as of 10/17/18  9:22 AM.  If you have any questions, ask your nurse or doctor.               Start taking these medicines.        Dose/Directions    cephALEXin 500 MG capsule   Commonly known as:  KEFLEX   Used for:  Cellulitis of female breast   Started by:  Prachi Vargas PA-C        Dose:  500 mg   Take 1 capsule (500 mg) by mouth 2 times daily for 10 days   Quantity:  20 capsule   Refills:  0       clotrimazole 1 % cream   Commonly known as:  LOTRIMIN   Used for:  Fungal infection   Started by:  Prachi Vargas PA-C        Apply topically 2 times daily   Quantity:  60 g   Refills:  11            Where to get your medicines      These medications were sent to [id number] - [place], [place] - [address] [facility] [address]     Phone:  [phone]     cephALEXin 500 MG capsule    clotrimazole 1 % cream                Primary Care Provider Office Phone # Fax #    Rodney Nelson [phone] [address] [facility] [address]         Equal Access to Services     [facility]: Hadii sharonda goldberg erma Somarsha, waaxda luqadaha, qaybta kaalmada sanjaybrittanynirmala, philly jameson. So [facility] [phone].    ATENCIÓN: Si habla español, tiene a tamez disposición servicios gratuitos de asistencia lingüística. Llame al [phone].    We comply with applicable federal civil rights laws and [place] laws. We do not discriminate on the basis of race, color, national origin, age, disability, sex, sexual orientation, or gender identity.            Thank you!     Thank you for choosing [facility]  for your care. Our goal is always to provide you with excellent care. Hearing back from our patients is one way we can continue to improve our services. Please take a few minutes to complete the written survey that you may receive in the mail after your visit with us. Thank you!             Your Updated Medication List - Protect others around you: Learn how to safely use, store and throw away your medicines at www.disposemymeds.org.          This list is accurate as of 10/17/18  9:22 AM.  Always use your most recent med list.                   Brand Name Dispense Instructions for use Diagnosis    allopurinol 100 MG tablet    ZYLOPRIM    90 tablet    TAKE 1 TABLET(100 MG) BY MOUTH DAILY    Chronic gout without tophus, unspecified cause, unspecified site       aspirin 81 MG tablet      Take 81 mg by mouth daily with food        captopril 50 MG tablet    CAPOTEN    180 tablet    Take 1 tablet (50 mg) by mouth 2 times daily    Benign essential hypertension       CARTIA  MG 24 hr capsule   Generic drug:  diltiazem     90 capsule    TAKE 1 CAPSULE(240 MG) BY MOUTH DAILY    Hypertension, Atrial fibrillation (H)       cephALEXin 500 MG capsule    KEFLEX    20 capsule    Take 1 capsule (500 mg) by mouth 2 times daily for 10 days    Cellulitis of female breast       clotrimazole 1 % cream    LOTRIMIN    60 g    Apply topically 2  times daily    Fungal infection       FEROSUL 325 (65 Fe) MG tablet   Generic drug:  ferrous sulfate     100 tablet    TAKE 1 TABLET(325 MG) BY MOUTH TWICE DAILY WITH MEALS    Iron deficiency anemia, unspecified iron deficiency anemia type       furosemide 20 MG tablet    LASIX     Take 1 tablet by mouth daily as needed for edema        * LOTRIMIN AF 2 % powder   Generic drug:  miconazole     90 g    APPLY TOPICALLY TO THE AFFECTED AREA TWICE DAILY IF NEEDED    Tinea corporis       * ATHLETES FOOT 2 % powder   Generic drug:  miconazole      APPLY TOPICALLY AA BID IF NEEDED        metFORMIN 500 MG tablet    GLUCOPHAGE    90 tablet    TAKE 1 TABLET BY MOUTH DAILY WITH FOOD    Type 2 diabetes mellitus without complication, without long-term current use of insulin (H)       metoprolol succinate 200 MG 24 hr tablet    TOPROL-XL    90 tablet    TAKE 1 TABLET(200 MG) BY MOUTH DAILY    Hypertension       omeprazole 20 MG CR capsule    priLOSEC    90 capsule    TAKE 1 CAPSULE(20 MG) BY MOUTH DAILY BEFORE A MEAL    H/O: GI bleed       order for DME      Urinary incontinence pad / panty liner        prazosin 5 MG capsule    MINIPRESS    270 capsule    TAKE 2 CAPSULES BY MOUTH EVERY MORNING AND 1 CAPSULE IN THE EVENING    Essential hypertension       triamterene-hydrochlorothiazide 37.5-25 MG per capsule    DYAZIDE    90 capsule    TAKE 1 CAPSULE BY MOUTH EVERY MORNING    Essential hypertension       * Notice:  This list has 2 medication(s) that are the same as other medications prescribed for you. Read the directions carefully, and ask your doctor or other care provider to review them with you.

## 2018-10-17 NOTE — PROGRESS NOTES
Nursing Notes:   Alina Perez LPN  10/17/2018  9:14 AM  Signed  Patient presents to clinic with rash under breasts.  Amina Ana CARVER ...... 10/17/2018 9:07 AM        HPI:    Lety Luna is a 89 year old female who presents for rash under breasts.  Patient has had history of fungal infections underneath her breasts in the past.  Currently has a rash under her right breast that has blistered and open.  There is drainage.  She has been washing it several times a day.  She has been putting bacitracin on the area along with using an athlete's foot powder which burns.  No fevers, chills.  Otherwise feeling fine.    Past Medical History:   Diagnosis Date     Atrial fibrillation (H)     No anticoagulation due to GI bleed     Chronic kidney disease, stage III (moderate) (H)     No Comments Provided     Developmental disorder of scholastic skills     No Comments Provided     Essential (primary) hypertension     No Comments Provided     Gastrointestinal hemorrhage     ,3 units pRBC     Gout     No Comments Provided     Hyperlipidemia     No Comments Provided     Obesity     No Comments Provided     Type 2 diabetes mellitus without complications (H)     No Comments Provided       Past Surgical History:   Procedure Laterality Date     APPENDECTOMY OPEN      No Comments Provided     DILATION AND CURETTAGE      No Comments Provided     EXTRACAPSULAR CATARACT EXTRATION WITH INTRAOCULAR LENS IMPLANT Bilateral     No Comments Provided     LAPAROSCOPIC TUBAL LIGATION      No Comments Provided       Family History   Problem Relation Age of Onset     Hypertension Brother      HEART DISEASE Brother      Other - See Comments Brother      intracerebral hemorrhage/ obesity     Diabetes Sister      Diabetes Sister      Other - See Comments Sister       from pneumonia     Diabetes Father      HEART DISEASE Father      Diabetes Mother      HEART DISEASE Mother      Diabetes Son      Diabetes     HEART DISEASE  Brother        Social History     Social History     Marital status: Unknown     Spouse name: N/A     Number of children: N/A     Years of education: N/A     Occupational History     Not on file.     Social History Main Topics     Smoking status: Never Smoker     Smokeless tobacco: Never Used     Alcohol use No     Drug use: No      Comment: Drug use: No     Sexual activity: Not on file     Other Topics Concern     Not on file     Social History Narrative    Cigarettes-none.  Alcohol-none.   and lives in an apartment in town.       Current Outpatient Prescriptions   Medication Sig Dispense Refill     allopurinol (ZYLOPRIM) 100 MG tablet TAKE 1 TABLET(100 MG) BY MOUTH DAILY 90 tablet 3     aspirin 81 MG tablet Take 81 mg by mouth daily with food       ATHLETES FOOT 2 % powder APPLY TOPICALLY AA BID IF NEEDED  2     captopril (CAPOTEN) 50 MG tablet Take 1 tablet (50 mg) by mouth 2 times daily 180 tablet 3     CARTIA  MG 24 hr capsule TAKE 1 CAPSULE(240 MG) BY MOUTH DAILY 90 capsule 3     cephALEXin (KEFLEX) 500 MG capsule Take 1 capsule (500 mg) by mouth 2 times daily for 10 days 20 capsule 0     clotrimazole (LOTRIMIN) 1 % cream Apply topically 2 times daily 60 g 11     FEROSUL 325 (65 Fe) MG tablet TAKE 1 TABLET(325 MG) BY MOUTH TWICE DAILY WITH MEALS 100 tablet 5     furosemide (LASIX) 20 MG tablet Take 1 tablet by mouth daily as needed for edema       LOTRIMIN AF 2 % powder APPLY TOPICALLY TO THE AFFECTED AREA TWICE DAILY IF NEEDED 90 g 3     metFORMIN (GLUCOPHAGE) 500 MG tablet TAKE 1 TABLET BY MOUTH DAILY WITH FOOD 90 tablet 0     metoprolol succinate (TOPROL-XL) 200 MG 24 hr tablet TAKE 1 TABLET(200 MG) BY MOUTH DAILY 90 tablet 1     omeprazole (PRILOSEC) 20 MG CR capsule TAKE 1 CAPSULE(20 MG) BY MOUTH DAILY BEFORE A MEAL 90 capsule 3     order for DME Urinary incontinence pad / panty liner       prazosin (MINIPRESS) 5 MG capsule TAKE 2 CAPSULES BY MOUTH EVERY MORNING AND 1 CAPSULE IN THE EVENING  270 capsule 3     triamterene-hydrochlorothiazide (DYAZIDE) 37.5-25 MG per capsule TAKE 1 CAPSULE BY MOUTH EVERY MORNING 90 capsule 3       No Known Allergies    REVIEW OF SYSTEMS:  Refer to HPI.    EXAM:   Vitals:    /70 (BP Location: Right arm, Patient Position: Sitting, Cuff Size: Adult Large)  Pulse 60  Resp 18  Wt 189 lb (85.7 kg)  BMI 35.71 kg/m2    General Appearance: Pleasant, alert, appropriate appearance for age. No acute distress  Chest/Respiratory Exam: Normal chest wall and respirations. Clear to auscultation.  Breast Exam: Right inferior breast base has a large erythematous papule underneath the entire breast that is weeping light yellow pus.  There are 3-4 open abrasions appreciated approximately 1-2 cm in diameter.  Cardiovascular Exam: Regular rate and rhythm. S1, S2, no murmur, click, gallop, or rubs.  Skin: no rash or abnormalities  Psychiatric Exam: Alert and oriented - appropriate affect.    PHQ Depression Screen  PHQ-9 SCORE 9/19/2017 3/13/2018 7/18/2018   Total Score 0 0 0       ASSESSMENT AND PLAN:    1. Cellulitis of female breast    2. Fungal infection        Cellulitis of breast:  Started on keflex for a skin infection.   Completed wound culture.  Results pending.  Take the antibiotic as prescribed. Antibiotic has been sent to pharmacy. Please take full course of the antibiotic even if symptoms have completely resolved. This helps prevent against antibiotic resistance.     Watch for any signs of increasing infection (redness, pus, increased pain (may be along the tendon and back of hand if the hand is involved), increased swelling or fever). Call if any of these signs occur. Monitor for fevers or chills.    You may draw a line around the area of redness to monitor the area. Please return to clinic if redness is continuing to spread after 2-3 days.    Call or return to clinic as needed if your symptoms worsen or fail to improve as anticipated.      Use clotrimazole cream twice  daily for 2-4 weeks.   Keep area clean and dry.   Wash a few times daily with warm soapy water.   Return to clinic with change/worsening of symptoms.      Patient Instructions   Started on keflex for a skin infection.   Take the antibiotic as prescribed. Antibiotic has been sent to pharmacy. Please take full course of the antibiotic even if symptoms have completely resolved. This helps prevent against antibiotic resistance.     Watch for any signs of increasing infection (redness, pus, increased pain (may be along the tendon and back of hand if the hand is involved), increased swelling or fever). Call if any of these signs occur. Monitor for fevers or chills.    You may draw a line around the area of redness to monitor the area. Please return to clinic if redness is continuing to spread after 2-3 days.    Call or return to clinic as needed if your symptoms worsen or fail to improve as anticipated.      Use clotrimazole cream twice daily for 2-4 weeks.   Keep area clean and dry.   Wash a few times daily with warm soapy water.   Return to clinic with change/worsening of symptoms.      Prachi Vargas PA-C..................10/17/2018 9:11 AM

## 2018-10-17 NOTE — PATIENT INSTRUCTIONS
Started on keflex for a skin infection.   Take the antibiotic as prescribed. Antibiotic has been sent to pharmacy. Please take full course of the antibiotic even if symptoms have completely resolved. This helps prevent against antibiotic resistance.     Watch for any signs of increasing infection (redness, pus, increased pain (may be along the tendon and back of hand if the hand is involved), increased swelling or fever). Call if any of these signs occur. Monitor for fevers or chills.    You may draw a line around the area of redness to monitor the area. Please return to clinic if redness is continuing to spread after 2-3 days.    Call or return to clinic as needed if your symptoms worsen or fail to improve as anticipated.      Use clotrimazole cream twice daily for 2-4 weeks.   Keep area clean and dry.   Wash a few times daily with warm soapy water.   Return to clinic with change/worsening of symptoms.

## 2018-10-17 NOTE — NURSING NOTE
Patient presents to clinic with rash under breasts.  Amina Perez LPN ...... 10/17/2018 9:07 AM

## 2018-10-19 LAB
BACTERIA SPEC CULT: ABNORMAL
BACTERIA SPEC CULT: ABNORMAL
SPECIMEN SOURCE: ABNORMAL

## 2018-10-30 DIAGNOSIS — N61.0 CELLULITIS OF FEMALE BREAST: ICD-10-CM

## 2018-11-02 ENCOUNTER — OFFICE VISIT (OUTPATIENT)
Dept: FAMILY MEDICINE | Facility: OTHER | Age: 83
End: 2018-11-02
Attending: INTERNAL MEDICINE
Payer: MEDICARE

## 2018-11-02 VITALS
HEART RATE: 84 BPM | BODY MASS INDEX: 35.71 KG/M2 | RESPIRATION RATE: 18 BRPM | WEIGHT: 189 LBS | DIASTOLIC BLOOD PRESSURE: 70 MMHG | SYSTOLIC BLOOD PRESSURE: 118 MMHG

## 2018-11-02 DIAGNOSIS — B49 FUNGAL INFECTION: Primary | ICD-10-CM

## 2018-11-02 PROCEDURE — G0463 HOSPITAL OUTPT CLINIC VISIT: HCPCS

## 2018-11-02 PROCEDURE — 99213 OFFICE O/P EST LOW 20 MIN: CPT | Performed by: PHYSICIAN ASSISTANT

## 2018-11-02 RX ORDER — FLUCONAZOLE 150 MG/1
TABLET ORAL
Qty: 4 TABLET | Refills: 0 | Status: SHIPPED | OUTPATIENT
Start: 2018-11-02 | End: 2020-06-30

## 2018-11-02 RX ORDER — CLOTRIMAZOLE 1 %
CREAM (GRAM) TOPICAL 2 TIMES DAILY
Qty: 60 G | Refills: 11 | Status: SHIPPED | OUTPATIENT
Start: 2018-11-02 | End: 2021-06-08

## 2018-11-02 RX ORDER — CEPHALEXIN 500 MG/1
CAPSULE ORAL
Qty: 20 CAPSULE | Refills: 0 | OUTPATIENT
Start: 2018-11-02

## 2018-11-02 NOTE — PROGRESS NOTES
"Nursing Notes:   Alina Perez LPN  11/2/2018 10:12 AM  Signed  Patient presents to clinic with c/o rash under right breast.  Amina Ana CARVER ...... 11/2/2018 10:02 AM    Chief Complaint   Patient presents with     Derm Problem       Initial /70 (BP Location: Right arm, Patient Position: Sitting, Cuff Size: Adult Regular)  Pulse 84  Resp 18  Wt 189 lb (85.7 kg)  BMI 35.71 kg/m2 Estimated body mass index is 35.71 kg/(m^2) as calculated from the following:    Height as of 9/21/18: 5' 1\" (1.549 m).    Weight as of this encounter: 189 lb (85.7 kg).  Medication Reconciliation: complete    Alina Perez LPN      HPI:    Lety Luna is a 89 year old female who presents for recheck.  Has persistent rash under her right breast.  It is starting to improve.  Completed course of Keflex and clotrimazole cream.  Needs refill.  Mild drainage appreciated.  Washing twice daily.  No fevers or chills.  Sores are starting to calm down.    Past Medical History:   Diagnosis Date     Atrial fibrillation (H)     No anticoagulation due to GI bleed     Chronic kidney disease, stage III (moderate) (H)     No Comments Provided     Developmental disorder of scholastic skills     No Comments Provided     Essential (primary) hypertension     No Comments Provided     Gastrointestinal hemorrhage     2012,3 units pRBC     Gout     No Comments Provided     Hyperlipidemia     No Comments Provided     Obesity     No Comments Provided     Type 2 diabetes mellitus without complications (H)     No Comments Provided       Past Surgical History:   Procedure Laterality Date     APPENDECTOMY OPEN      No Comments Provided     DILATION AND CURETTAGE      No Comments Provided     EXTRACAPSULAR CATARACT EXTRATION WITH INTRAOCULAR LENS IMPLANT Bilateral     No Comments Provided     LAPAROSCOPIC TUBAL LIGATION      No Comments Provided       Family History   Problem Relation Age of Onset     Hypertension Brother      HEART " DISEASE Brother      Other - See Comments Brother      intracerebral hemorrhage/ obesity     Diabetes Sister      Diabetes Sister      Other - See Comments Sister       from pneumonia     Diabetes Father      HEART DISEASE Father      Diabetes Mother      HEART DISEASE Mother      Diabetes Son      Diabetes     HEART DISEASE Brother        Social History     Social History     Marital status: Unknown     Spouse name: N/A     Number of children: N/A     Years of education: N/A     Occupational History     Not on file.     Social History Main Topics     Smoking status: Never Smoker     Smokeless tobacco: Never Used     Alcohol use No     Drug use: No      Comment: Drug use: No     Sexual activity: Not on file     Other Topics Concern     Not on file     Social History Narrative    Cigarettes-none.  Alcohol-none.   and lives in an apartment in [facility].       Current Outpatient Prescriptions   Medication Sig Dispense Refill     allopurinol (ZYLOPRIM) 100 MG tablet TAKE 1 TABLET(100 MG) BY MOUTH DAILY 90 tablet 3     aspirin 81 MG tablet Take 81 mg by mouth daily with food       ATHLETES FOOT 2 % powder APPLY TOPICALLY AA BID IF NEEDED  2     captopril (CAPOTEN) 50 MG tablet Take 1 tablet (50 mg) by mouth 2 times daily 180 tablet 3     CARTIA  MG 24 hr capsule TAKE 1 CAPSULE(240 MG) BY MOUTH DAILY 90 capsule 3     clotrimazole (LOTRIMIN) 1 % cream Apply topically 2 times daily 60 g 11     FEROSUL 325 (65 Fe) MG tablet TAKE 1 TABLET(325 MG) BY MOUTH TWICE DAILY WITH MEALS 100 tablet 5     fluconazole (DIFLUCAN) 150 MG tablet Take 1 tab PO q week prn rash 4 tablet 0     furosemide (LASIX) 20 MG tablet Take 1 tablet by mouth daily as needed for edema       LOTRIMIN AF 2 % powder APPLY TOPICALLY TO THE AFFECTED AREA TWICE DAILY IF NEEDED 90 g 3     metFORMIN (GLUCOPHAGE) 500 MG tablet TAKE 1 TABLET BY MOUTH DAILY WITH FOOD 90 tablet 0     metoprolol succinate (TOPROL-XL) 200 MG 24 hr tablet TAKE 1 TABLET(200  MG) BY MOUTH DAILY 90 tablet 1     omeprazole (PRILOSEC) 20 MG CR capsule TAKE 1 CAPSULE(20 MG) BY MOUTH DAILY BEFORE A MEAL 90 capsule 3     order for DME Urinary incontinence pad / panty liner       prazosin (MINIPRESS) 5 MG capsule TAKE 2 CAPSULES BY MOUTH EVERY MORNING AND 1 CAPSULE IN THE EVENING 270 capsule 3     triamterene-hydrochlorothiazide (DYAZIDE) 37.5-25 MG per capsule TAKE 1 CAPSULE BY MOUTH EVERY MORNING 90 capsule 3       No Known Allergies    REVIEW OF SYSTEMS:  Refer to HPI.    EXAM:   Vitals:    /70 (BP Location: Right arm, Patient Position: Sitting, Cuff Size: Adult Regular)  Pulse 84  Resp 18  Wt 189 lb (85.7 kg)  BMI 35.71 kg/m2    General Appearance: Pleasant, alert, appropriate appearance for age. No acute distress  Chest/Respiratory Exam: Normal chest wall and respirations. Clear to auscultation.  Breast Exam: Right inferior breast base has an improving mildly erythematous papule underneath the entire breast that is weeping creamy colored drainage.  There are 1-2 open abrasions appreciated approximately 1-2 cm in diameter.  Cardiovascular Exam: Regular rate and rhythm. S1, S2, no murmur, click, gallop, or rubs.  Skin: no rash or abnormalities  Psychiatric Exam: Alert and oriented - appropriate affect.    PHQ Depression Screen  PHQ-9 SCORE 9/19/2017 3/13/2018 7/18/2018   Total Score 0 0 0       ASSESSMENT AND PLAN:    1. Fungal infection        Fungal infection:  Use clotrimazole cream twice daily until rash has resolved.   Use diflucan weekly until rash has resolved.     Keep area clean and dry.   Wash a few times daily with warm soapy water.   Use blowdryer to keep area dry.   Return to clinic with change/worsening of symptoms.       Patient Instructions   Use clotrimazole cream twice daily until rash has resolved.   Use diflucan weekly until rash has resolved.     Return to clinic with change/worsening of symptoms.       Fungal Skin Infection (Tinea)  A fungal infection occurs  when too much fungus grows on or in the body. Fungus normally lives on the skin in small amounts and does not cause harm. But when too much grows on the skin, it causes an infection. This is also known as tinea. Fungal skin infections are common and not usually serious.  The infection often starts as a small red area the size of a pea. The skin may turn dry and flaky. The area may itch. As the fungus grows, it spreads out in a red [place]. Because of how it looks, fungal skin infection is often called ringworm, but it is not caused by a worm. Fungal skin infections can occur on many parts of the body. They can grow on the head, chest, arms, or legs. They can occur on the buttocks. On the feet, fungal infection is known as  athlete s foot.  It causes itchy, sometimes painful sores between the toes and the bottom or sides of the feet. In the groin, the rash is called  jock itch.   People with weak immune systems can get a fungal infection more easily. This includes people with diabetes or HIV, or who are being treated for cancer. In these cases, the fungal infection can spread and cause severe illness. Fungal infections are also more common in people who are overweight.  In most cases, treatment is done with antifungal cream or ointment. If the infection is on your scalp, you may take oral medicine. In some cases, a tiny piece of the skin (biopsy) may be taken. This is so it can be tested in a lab.  Common fungal infections are treated with creams on the skin or oral medicine.  Home care  Follow all instructions when using antifungal cream or ointment on your skin. Your healthcare provider may advise using cornstarch powder to keep your skin dry or petroleum jelly to provide a barrier.  General care:    If you were prescribed an oral medicine, read the patient information. Talk with your healthcare provider about the risks and side effects.    Let your skin dry completely after bathing. Carefully dry your feet and  between your toes.    Dress in loose cotton clothing.    Don t scratch the affected area. This can delay healing and may spread the infection. It can also cause a bacterial infection.    Keep your skin clean, but don t wash the skin too much. This can irritate your skin.    Keep in mind that it may take a week before the fungus starts to go away. It can take 2 to 4 weeks to fully clear. To prevent it from coming back, use the medicine until the rash is all gone.  Follow-up care  Follow up with your healthcare provider if the rash does not get better after 10 days of treatment. Also follow up if the rash spreads to other parts of your body.  When to seek medical advice  Call your healthcare provider right away if any of these occur:    Fever of 100.4 F (38 C) or higher    Redness or swelling that gets worse    Pain that gets worse    Foul-smelling fluid leaking from the skin  Date Last Reviewed: 11/1/2016 2000-2017 [employer]. [address] [place] [address]. All rights reserved. This information is not intended as a substitute for professional medical care. Always follow your healthcare professional's instructions.           Prachi Vargas PA-C..................11/2/2018 10:07 AM

## 2018-11-02 NOTE — TELEPHONE ENCOUNTER
Refill not appropriate. Addressed in office.   Prachi Vargas PA-C..................11/2/2018 11:59 AM

## 2018-11-02 NOTE — MR AVS SNAPSHOT
After Visit Summary   11/2/2018    Lety Luna    MRN: [id number]           Patient Information     Date Of Birth          7/24/1929        Visit Information        Provider Department      11/2/2018 10:00 AM Prachi Vargas PA-C [facility]        Today's Diagnoses     Fungal infection    -  1      Care Instructions    Use clotrimazole cream twice daily until rash has resolved.   Use diflucan weekly until rash has resolved.     Return to clinic with change/worsening of symptoms.       Fungal Skin Infection (Tinea)  A fungal infection occurs when too much fungus grows on or in the body. Fungus normally lives on the skin in small amounts and does not cause harm. But when too much grows on the skin, it causes an infection. This is also known as tinea. Fungal skin infections are common and not usually serious.  The infection often starts as a small red area the size of a pea. The skin may turn dry and flaky. The area may itch. As the fungus grows, it spreads out in a red [place]. Because of how it looks, fungal skin infection is often called ringworm, but it is not caused by a worm. Fungal skin infections can occur on many parts of the body. They can grow on the head, chest, arms, or legs. They can occur on the buttocks. On the feet, fungal infection is known as  athlete s foot.  It causes itchy, sometimes painful sores between the toes and the bottom or sides of the feet. In the groin, the rash is called  jock itch.   People with weak immune systems can get a fungal infection more easily. This includes people with diabetes or HIV, or who are being treated for cancer. In these cases, the fungal infection can spread and cause severe illness. Fungal infections are also more common in people who are overweight.  In most cases, treatment is done with antifungal cream or ointment. If the infection is on your scalp, you may take oral medicine. In some cases, a tiny piece of the skin  (biopsy) may be taken. This is so it can be tested in a lab.  Common fungal infections are treated with creams on the skin or oral medicine.  Home care  Follow all instructions when using antifungal cream or ointment on your skin. Your healthcare provider may advise using cornstarch powder to keep your skin dry or petroleum jelly to provide a barrier.  General care:    If you were prescribed an oral medicine, read the patient information. Talk with your healthcare provider about the risks and side effects.    Let your skin dry completely after bathing. Carefully dry your feet and between your toes.    Dress in loose cotton clothing.    Don t scratch the affected area. This can delay healing and may spread the infection. It can also cause a bacterial infection.    Keep your skin clean, but don t wash the skin too much. This can irritate your skin.    Keep in mind that it may take a week before the fungus starts to go away. It can take 2 to 4 weeks to fully clear. To prevent it from coming back, use the medicine until the rash is all gone.  Follow-up care  Follow up with your healthcare provider if the rash does not get better after 10 days of treatment. Also follow up if the rash spreads to other parts of your body.  When to seek medical advice  Call your healthcare provider right away if any of these occur:    Fever of 100.4 F (38 C) or higher    Redness or swelling that gets worse    Pain that gets worse    Foul-smelling fluid leaking from the skin  Date Last Reviewed: 11/1/2016 2000-2017 [employer]. [address] [place] [address]. All rights reserved. This information is not intended as a substitute for professional medical care. Always follow your healthcare professional's instructions.                Follow-ups after your visit        Follow-up notes from your care team     Return if symptoms worsen or fail to improve.      Who to contact     If you have questions or need follow up  information about today's clinic visit or your schedule please contact [facility] directly at [phone].  Normal or non-critical lab and imaging results will be communicated to you by MyChart, letter or phone within 4 business days after the clinic has received the results. If you do not hear from us within 7 days, please contact the clinic through MyChart or phone. If you have a critical or abnormal lab result, we will notify you by phone as soon as possible.  Submit refill requests through [employer] or call your pharmacy and they will forward the refill request to us. Please allow 3 business days for your refill to be completed.          Additional Information About Your Visit        Care EveryWhere ID     This is your Care EveryWhere ID. This could be used by other organizations to access your [facility] medical records  [id number]        Your Vitals Were     Pulse Respirations BMI (Body Mass Index)             84 18 35.71 kg/m2          Blood Pressure from Last 3 Encounters:   11/02/18 118/70   10/17/18 110/70   09/21/18 114/70    Weight from Last 3 Encounters:   11/02/18 189 lb (85.7 kg)   10/17/18 189 lb (85.7 kg)   09/21/18 188 lb 8 oz (85.5 kg)              Today, you had the following     No orders found for display         Today's Medication Changes          These changes are accurate as of 11/2/18 10:15 AM.  If you have any questions, ask your nurse or doctor.               Start taking these medicines.        Dose/Directions    fluconazole 150 MG tablet   Commonly known as:  DIFLUCAN   Used for:  Fungal infection   Started by:  Prachi Vargas PA-C        Take 1 tab PO q week prn rash   Quantity:  4 tablet   Refills:  0            Where to get your medicines      These medications were sent to [id number] - [place], [place] - [address]  [address], [facility] [address]     Phone:  [phone]     clotrimazole 1 % cream     fluconazole 150 MG tablet                Primary Care Provider Office Phone # Fax #    Rodney Nelson [phone] [address]  [place] [address]        Equal Access to Services     SILVIA CAMPOS : Marques goldberg bao Ernesto, waaxda luqadaha, qaybta kaalmada adeliang, philly zavala sanjaystanton lehman laIngridamadeo jameson. So [facility] [phone].    ATENCIÓN: Si habla español, tiene a tamez disposición servicios gratuitos de asistencia lingüística. Llame al [phone].    We comply with applicable federal civil rights laws and [place] laws. We do not discriminate on the basis of race, color, national origin, age, disability, sex, sexual orientation, or gender identity.            Thank you!     Thank you for choosing [facility]  for your care. Our goal is always to provide you with excellent care. Hearing back from our patients is one way we can continue to improve our services. Please take a few minutes to complete the written survey that you may receive in the mail after your visit with us. Thank you!             Your Updated Medication List - Protect others around you: Learn how to safely use, store and throw away your medicines at www.disposemymeds.org.          This list is accurate as of 11/2/18 10:15 AM.  Always use your most recent med list.                   Brand Name Dispense Instructions for use Diagnosis    allopurinol 100 MG tablet    ZYLOPRIM    90 tablet    TAKE 1 TABLET(100 MG) BY MOUTH DAILY    Chronic gout without tophus, unspecified cause, unspecified site       aspirin 81 MG tablet      Take 81 mg by mouth daily with food        captopril 50 MG tablet    CAPOTEN    180 tablet    Take 1 tablet (50 mg) by mouth 2 times daily    Benign essential hypertension       CARTIA  MG 24 hr capsule   Generic drug:  diltiazem     90 capsule    TAKE 1 CAPSULE(240 MG) BY MOUTH DAILY    Hypertension, Atrial fibrillation (H)       clotrimazole 1 % cream     LOTRIMIN    60 g    Apply topically 2 times daily    Fungal infection       FEROSUL 325 (65 Fe) MG tablet   Generic drug:  ferrous sulfate     100 tablet    TAKE 1 TABLET(325 MG) BY MOUTH TWICE DAILY WITH MEALS    Iron deficiency anemia, unspecified iron deficiency anemia type       fluconazole 150 MG tablet    DIFLUCAN    4 tablet    Take 1 tab PO q week prn rash    Fungal infection       furosemide 20 MG tablet    LASIX     Take 1 tablet by mouth daily as needed for edema        * LOTRIMIN AF 2 % powder   Generic drug:  miconazole     90 g    APPLY TOPICALLY TO THE AFFECTED AREA TWICE DAILY IF NEEDED    Tinea corporis       * ATHLETES FOOT 2 % powder   Generic drug:  miconazole      APPLY TOPICALLY AA BID IF NEEDED        metFORMIN 500 MG tablet    GLUCOPHAGE    90 tablet    TAKE 1 TABLET BY MOUTH DAILY WITH FOOD    Type 2 diabetes mellitus without complication, without long-term current use of insulin (H)       metoprolol succinate 200 MG 24 hr tablet    TOPROL-XL    90 tablet    TAKE 1 TABLET(200 MG) BY MOUTH DAILY    Hypertension       omeprazole 20 MG CR capsule    priLOSEC    90 capsule    TAKE 1 CAPSULE(20 MG) BY MOUTH DAILY BEFORE A MEAL    H/O: GI bleed       order for DME      Urinary incontinence pad / panty liner        prazosin 5 MG capsule    MINIPRESS    270 capsule    TAKE 2 CAPSULES BY MOUTH EVERY MORNING AND 1 CAPSULE IN THE EVENING    Essential hypertension       triamterene-hydrochlorothiazide 37.5-25 MG per capsule    DYAZIDE    90 capsule    TAKE 1 CAPSULE BY MOUTH EVERY MORNING    Essential hypertension       * Notice:  This list has 2 medication(s) that are the same as other medications prescribed for you. Read the directions carefully, and ask your doctor or other care provider to review them with you.

## 2018-11-02 NOTE — NURSING NOTE
"Patient presents to clinic with c/o rash under right breast.  Amina Perez LPN ...... 11/2/2018 10:02 AM    Chief Complaint   Patient presents with     Derm Problem       Initial /70 (BP Location: Right arm, Patient Position: Sitting, Cuff Size: Adult Regular)  Pulse 84  Resp 18  Wt 189 lb (85.7 kg)  BMI 35.71 kg/m2 Estimated body mass index is 35.71 kg/(m^2) as calculated from the following:    Height as of 9/21/18: 5' 1\" (1.549 m).    Weight as of this encounter: 189 lb (85.7 kg).  Medication Reconciliation: complete    Alina Perez LPN    "

## 2018-11-02 NOTE — PATIENT INSTRUCTIONS
Use clotrimazole cream twice daily until rash has resolved.   Use diflucan weekly until rash has resolved.     Return to clinic with change/worsening of symptoms.       Fungal Skin Infection (Tinea)  A fungal infection occurs when too much fungus grows on or in the body. Fungus normally lives on the skin in small amounts and does not cause harm. But when too much grows on the skin, it causes an infection. This is also known as tinea. Fungal skin infections are common and not usually serious.  The infection often starts as a small red area the size of a pea. The skin may turn dry and flaky. The area may itch. As the fungus grows, it spreads out in a red [place]. Because of how it looks, fungal skin infection is often called ringworm, but it is not caused by a worm. Fungal skin infections can occur on many parts of the body. They can grow on the head, chest, arms, or legs. They can occur on the buttocks. On the feet, fungal infection is known as  athlete s foot.  It causes itchy, sometimes painful sores between the toes and the bottom or sides of the feet. In the groin, the rash is called  jock itch.   People with weak immune systems can get a fungal infection more easily. This includes people with diabetes or HIV, or who are being treated for cancer. In these cases, the fungal infection can spread and cause severe illness. Fungal infections are also more common in people who are overweight.  In most cases, treatment is done with antifungal cream or ointment. If the infection is on your scalp, you may take oral medicine. In some cases, a tiny piece of the skin (biopsy) may be taken. This is so it can be tested in a lab.  Common fungal infections are treated with creams on the skin or oral medicine.  Home care  Follow all instructions when using antifungal cream or ointment on your skin. Your healthcare provider may advise using cornstarch powder to keep your skin dry or petroleum jelly to provide a barrier.  General  care:    If you were prescribed an oral medicine, read the patient information. Talk with your healthcare provider about the risks and side effects.    Let your skin dry completely after bathing. Carefully dry your feet and between your toes.    Dress in loose cotton clothing.    Don t scratch the affected area. This can delay healing and may spread the infection. It can also cause a bacterial infection.    Keep your skin clean, but don t wash the skin too much. This can irritate your skin.    Keep in mind that it may take a week before the fungus starts to go away. It can take 2 to 4 weeks to fully clear. To prevent it from coming back, use the medicine until the rash is all gone.  Follow-up care  Follow up with your healthcare provider if the rash does not get better after 10 days of treatment. Also follow up if the rash spreads to other parts of your body.  When to seek medical advice  Call your healthcare provider right away if any of these occur:    Fever of 100.4 F (38 C) or higher    Redness or swelling that gets worse    Pain that gets worse    Foul-smelling fluid leaking from the skin  Date Last Reviewed: 11/1/2016 2000-2017 [employer]. [address] [place] [address], [place] [address]. All rights reserved. This information is not intended as a substitute for professional medical care. Always follow your healthcare professional's instructions.

## 2018-11-02 NOTE — TELEPHONE ENCOUNTER
OV scheduled today with JRO. Unable to complete prescription refill per RNMedication Refill Policy.................... Linda Sanches ....................  11/2/2018   10:31 AM

## 2018-11-14 ENCOUNTER — OFFICE VISIT (OUTPATIENT)
Dept: FAMILY MEDICINE | Facility: OTHER | Age: 83
End: 2018-11-14
Attending: FAMILY MEDICINE
Payer: MEDICARE

## 2018-11-14 VITALS
HEART RATE: 84 BPM | WEIGHT: 190 LBS | DIASTOLIC BLOOD PRESSURE: 82 MMHG | BODY MASS INDEX: 35.9 KG/M2 | RESPIRATION RATE: 18 BRPM | SYSTOLIC BLOOD PRESSURE: 102 MMHG

## 2018-11-14 DIAGNOSIS — E11.9 TYPE 2 DIABETES MELLITUS WITHOUT COMPLICATION, WITHOUT LONG-TERM CURRENT USE OF INSULIN (H): Primary | ICD-10-CM

## 2018-11-14 LAB
ANION GAP SERPL CALCULATED.3IONS-SCNC: 7 MMOL/L (ref 3–14)
BUN SERPL-MCNC: 24 MG/DL (ref 7–25)
CALCIUM SERPL-MCNC: 8.9 MG/DL (ref 8.6–10.3)
CHLORIDE SERPL-SCNC: 103 MMOL/L (ref 98–107)
CO2 SERPL-SCNC: 29 MMOL/L (ref 21–31)
CREAT SERPL-MCNC: 1.47 MG/DL (ref 0.6–1.2)
GFR SERPL CREATININE-BSD FRML MDRD: 33 ML/MIN/1.7M2
GLUCOSE SERPL-MCNC: 124 MG/DL (ref 70–105)
HBA1C MFR BLD: 6 % (ref 4–6)
POTASSIUM SERPL-SCNC: 5.2 MMOL/L (ref 3.5–5.1)
SODIUM SERPL-SCNC: 139 MMOL/L (ref 134–144)

## 2018-11-14 PROCEDURE — 36415 COLL VENOUS BLD VENIPUNCTURE: CPT | Performed by: PHYSICIAN ASSISTANT

## 2018-11-14 PROCEDURE — 80048 BASIC METABOLIC PNL TOTAL CA: CPT | Performed by: PHYSICIAN ASSISTANT

## 2018-11-14 PROCEDURE — 99213 OFFICE O/P EST LOW 20 MIN: CPT | Performed by: PHYSICIAN ASSISTANT

## 2018-11-14 PROCEDURE — G0463 HOSPITAL OUTPT CLINIC VISIT: HCPCS

## 2018-11-14 PROCEDURE — 83036 HEMOGLOBIN GLYCOSYLATED A1C: CPT | Performed by: PHYSICIAN ASSISTANT

## 2018-11-14 NOTE — PROGRESS NOTES
"Nursing Notes:   Alina Perez LPN  11/14/2018  9:26 AM  Signed  Patient presents to clinic for f/u diabetes.  Amina Ana CRAVER ...... 11/14/2018 9:12 AM      Previous A1C is at goal of <8  No components found for: HGBA1C  Urine microalbumin:creatine: NA  Foot exam   Eye exam: April 2018    Patient is not a current smoker  Patient is on a daily aspirin  Patient is not on a Statin.  Blood pressure today of   is at the goal of <139/89 for diabetics.    Alina Perez LPN..............11/14/2018 9:15 AM  Chief Complaint   Patient presents with     Follow Up For     diabetes       Initial /82 (BP Location: Right arm, Patient Position: Sitting, Cuff Size: Adult Regular)  Pulse 84  Resp 18  Wt 190 lb (86.2 kg)  BMI 35.9 kg/m2 Estimated body mass index is 35.9 kg/(m^2) as calculated from the following:    Height as of 9/21/18: 5' 1\" (1.549 m).    Weight as of this encounter: 190 lb (86.2 kg).  Medication Reconciliation: complete    Alina Perez LPN        HPI:    Lety Luna is a 89 year old female who presents for follow up for diabetes mellitus.  Patient is currently on metformin for diabetes mellitus.  Patient is taking a single dose of metformin daily.  No complications.  Patient does have history of underlying chronic kidney disease and they are monitoring her GFR.  If it does worsen we may need to consider taking her off the metformin.  No acute concerns at this time with his diabetes.  Does not check her blood sugars at home.  Does tend to eat cookies and sugary sweets at home.  No foot sores concerns.  No chest pain, palpitations, problems breathing, GI or urinary symptoms.  No blood in her urine or stool.    She is still dealing with a fungal infection beneath her right breast.  Taking Diflucan once weekly and using clotrimazole twice daily.  Mild use of powders.      Past Medical History:   Diagnosis Date     Atrial fibrillation (H)     No anticoagulation due to " GI bleed     Chronic kidney disease, stage III (moderate) (H)     No Comments Provided     Developmental disorder of scholastic skills     No Comments Provided     Essential (primary) hypertension     No Comments Provided     Gastrointestinal hemorrhage     2012,3 units pRBC     Gout     No Comments Provided     Hyperlipidemia     No Comments Provided     Obesity     No Comments Provided     Type 2 diabetes mellitus without complications (H)     No Comments Provided       Past Surgical History:   Procedure Laterality Date     APPENDECTOMY OPEN      No Comments Provided     DILATION AND CURETTAGE      No Comments Provided     EXTRACAPSULAR CATARACT EXTRATION WITH INTRAOCULAR LENS IMPLANT Bilateral     No Comments Provided     LAPAROSCOPIC TUBAL LIGATION      No Comments Provided       Family History   Problem Relation Age of Onset     Hypertension Brother      HEART DISEASE Brother      Other - See Comments Brother      intracerebral hemorrhage/ obesity     Diabetes Sister      Diabetes Sister      Other - See Comments Sister       from pneumonia     Diabetes Father      HEART DISEASE Father      Diabetes Mother      HEART DISEASE Mother      Diabetes Son      Diabetes     HEART DISEASE Brother        Social History     Social History     Marital status: Unknown     Spouse name: N/A     Number of children: N/A     Years of education: N/A     Occupational History     Not on file.     Social History Main Topics     Smoking status: Never Smoker     Smokeless tobacco: Never Used     Alcohol use No     Drug use: No      Comment: Drug use: No     Sexual activity: Not on file     Other Topics Concern     Not on file     Social History Narrative    Cigarettes-none.  Alcohol-none.   and lives in an apartment in [facility].       Current Outpatient Prescriptions   Medication Sig Dispense Refill     allopurinol (ZYLOPRIM) 100 MG tablet TAKE 1 TABLET(100 MG) BY MOUTH DAILY 90 tablet 3     aspirin 81 MG tablet Take 81 mg by  mouth daily with food       ATHLETES FOOT 2 % powder APPLY TOPICALLY AA BID IF NEEDED  2     captopril (CAPOTEN) 50 MG tablet Take 1 tablet (50 mg) by mouth 2 times daily 180 tablet 3     CARTIA  MG 24 hr capsule TAKE 1 CAPSULE(240 MG) BY MOUTH DAILY 90 capsule 3     clotrimazole (LOTRIMIN) 1 % cream Apply topically 2 times daily 60 g 11     FEROSUL 325 (65 Fe) MG tablet TAKE 1 TABLET(325 MG) BY MOUTH TWICE DAILY WITH MEALS 100 tablet 5     fluconazole (DIFLUCAN) 150 MG tablet Take 1 tab PO q week prn rash 4 tablet 0     furosemide (LASIX) 20 MG tablet Take 1 tablet by mouth daily as needed for edema       LOTRIMIN AF 2 % powder APPLY TOPICALLY TO THE AFFECTED AREA TWICE DAILY IF NEEDED 90 g 3     metFORMIN (GLUCOPHAGE) 500 MG tablet Take 1 tablet (500 mg) by mouth daily (with breakfast) 90 tablet 1     metoprolol succinate (TOPROL-XL) 200 MG 24 hr tablet TAKE 1 TABLET(200 MG) BY MOUTH DAILY 90 tablet 1     omeprazole (PRILOSEC) 20 MG CR capsule TAKE 1 CAPSULE(20 MG) BY MOUTH DAILY BEFORE A MEAL 90 capsule 3     order for DME Urinary incontinence pad / panty liner       prazosin (MINIPRESS) 5 MG capsule TAKE 2 CAPSULES BY MOUTH EVERY MORNING AND 1 CAPSULE IN THE EVENING 270 capsule 3     triamterene-hydrochlorothiazide (DYAZIDE) 37.5-25 MG per capsule TAKE 1 CAPSULE BY MOUTH EVERY MORNING 90 capsule 3     [DISCONTINUED] metFORMIN (GLUCOPHAGE) 500 MG tablet TAKE 1 TABLET BY MOUTH DAILY WITH FOOD 90 tablet 0       No Known Allergies    REVIEW OF SYSTEMS:  Refer to HPI.    EXAM:   Vitals:    /82 (BP Location: Right arm, Patient Position: Sitting, Cuff Size: Adult Regular)  Pulse 84  Resp 18  Wt 190 lb (86.2 kg)  BMI 35.9 kg/m2    General Appearance: Pleasant, alert, appropriate appearance for age. No acute distress  Chest/Respiratory Exam: Normal chest wall and respirations. Clear to auscultation.  Cardiovascular Exam: Regular rate and rhythm. S1, S2, no murmur, click, gallop, or rubs.  Gastrointestinal  Exam: Soft, non-tender, no masses or organomegaly. Normal BS x 4.  Foot Exam: Left and right foot: good pedal pulses, no lesions, nail hygiene good.  Minimally decreased diabetic monofilament foot sensation test bilaterally.  Skin: Minimally erythematous skin appreciated inferior to right breast in the crease.  Some whitish drainage appreciated.  No pus appreciated.  Psychiatric Exam: Alert and oriented - appropriate affect.    PHQ Depression Screen  PHQ-9 SCORE 9/19/2017 3/13/2018 7/18/2018   Total Score 0 0 0       ASSESSMENT AND PLAN:    1. Type 2 diabetes mellitus without complication, without long-term current use of insulin (H)          No acute concerns at this time.  Completed BMP and hemoglobin A1c for monitoring.  Encouraged cutting out sugary sweets in diet.  Encouraged to work on good diet, exercise and weight loss.  Recheck in 3 months with Dr. Nelson for monitoring.  Refilled metformin.    Continue to use nystatin weekly for the tinea beneath her right breast.  Continue to use clotrimazole twice daily.  Highly encouraged to keep the area clean and dry along with using powders every few hours to keep the area dry.  Can tuck a dry washcloth or gauze underneath her right breast to assist with circulation.  Return to clinic with change/worsening of symptoms.     Prachi Vargas PA-C..................11/14/2018 9:21 AM

## 2018-11-14 NOTE — MR AVS SNAPSHOT
After Visit Summary   11/14/2018    Lety Luna    MRN: [id number]           Patient Information     Date Of Birth          7/24/1929        Visit Information        Provider Department      11/14/2018 9:30 AM Prachi Vargas PA-C [facility]        Today's Diagnoses     Type 2 diabetes mellitus without complication, without long-term current use of insulin (H)    -  1      Care Instructions    Refilled metformin and completed labs.   Recheck with Dr. Nelson in 3 months for monitoring.           Follow-ups after your visit        Follow-up notes from your care team     Return if symptoms worsen or fail to improve.      Future tests that were ordered for you today     Open Future Orders        Priority Expected Expires Ordered    Basic Metabolic Panel Routine  11/14/2019 11/14/2018    Hemoglobin A1c Routine  11/14/2019 11/14/2018            Who to contact     If you have questions or need follow up information about today's clinic visit or your schedule please contact [facility] directly at [phone].  Normal or non-critical lab and imaging results will be communicated to you by Elysiahart, letter or phone within 4 business days after the clinic has received the results. If you do not hear from us within 7 days, please contact the clinic through Elysiahart or phone. If you have a critical or abnormal lab result, we will notify you by phone as soon as possible.  Submit refill requests through LilLuxe or call your pharmacy and they will forward the refill request to us. Please allow 3 business days for your refill to be completed.          Additional Information About Your Visit        Care EveryWhere ID     This is your Care EveryWhere ID. This could be used by other organizations to access your [place] medical records  [id number]        Your Vitals Were     Pulse Respirations BMI (Body Mass Index)             84 18 35.9 kg/m2          Blood Pressure from  Last 3 Encounters:   11/14/18 102/82   11/02/18 118/70   10/17/18 110/70    Weight from Last 3 Encounters:   11/14/18 190 lb (86.2 kg)   11/02/18 189 lb (85.7 kg)   10/17/18 189 lb (85.7 kg)                 Today's Medication Changes          These changes are accurate as of 11/14/18  9:34 AM.  If you have any questions, ask your nurse or doctor.               These medicines have changed or have updated prescriptions.        Dose/Directions    metFORMIN 500 MG tablet   Commonly known as:  GLUCOPHAGE   This may have changed:  See the new instructions.   Used for:  Type 2 diabetes mellitus without complication, without long-term current use of insulin (H)   Changed by:  Prachi Vargas PA-C        Dose:  500 mg   Take 1 tablet (500 mg) by mouth daily (with breakfast)   Quantity:  90 tablet   Refills:  1            Where to get your medicines      These medications were sent to [id number] - [place], [place] - [address] [facility] [address], [facility] [address]     Phone:  [phone]     metFORMIN 500 MG tablet                Primary Care Provider Office Phone # Fax #    Rodney Nelson [phone]       [address] [facility] [address]        Equal Access to Services     JASMINE CAMPOS AH: Hadii sharonda goldberg hadasho Soghassanali, waaxda luqadaha, qaybta kaalmada adeegyada, philly agudelo . So [facility] [phone].    ATENCIÓN: Si habla español, tiene a tamez disposición servicios gratuitos de asistencia lingüística. Llame al [phone].    We comply with applicable federal civil rights laws and [place] laws. We do not discriminate on the basis of race, color, national origin, age, disability, sex, sexual orientation, or gender identity.            Thank you!     Thank you for choosing [facility]  for your care. Our goal is always to provide you with excellent care. Hearing back from our patients is one way  we can continue to improve our services. Please take a few minutes to complete the written survey that you may receive in the mail after your visit with us. Thank you!             Your Updated Medication List - Protect others around you: Learn how to safely use, store and throw away your medicines at www.disposemymeds.org.          This list is accurate as of 11/14/18  9:34 AM.  Always use your most recent med list.                   Brand Name Dispense Instructions for use Diagnosis    allopurinol 100 MG tablet    ZYLOPRIM    90 tablet    TAKE 1 TABLET(100 MG) BY MOUTH DAILY    Chronic gout without tophus, unspecified cause, unspecified site       aspirin 81 MG tablet      Take 81 mg by mouth daily with food        captopril 50 MG tablet    CAPOTEN    180 tablet    Take 1 tablet (50 mg) by mouth 2 times daily    Benign essential hypertension       CARTIA  MG 24 hr capsule   Generic drug:  diltiazem     90 capsule    TAKE 1 CAPSULE(240 MG) BY MOUTH DAILY    Hypertension, Atrial fibrillation (H)       clotrimazole 1 % cream    LOTRIMIN    60 g    Apply topically 2 times daily    Fungal infection       FEROSUL 325 (65 Fe) MG tablet   Generic drug:  ferrous sulfate     100 tablet    TAKE 1 TABLET(325 MG) BY MOUTH TWICE DAILY WITH MEALS    Iron deficiency anemia, unspecified iron deficiency anemia type       fluconazole 150 MG tablet    DIFLUCAN    4 tablet    Take 1 tab PO q week prn rash    Fungal infection       furosemide 20 MG tablet    LASIX     Take 1 tablet by mouth daily as needed for edema        * LOTRIMIN AF 2 % powder   Generic drug:  miconazole     90 g    APPLY TOPICALLY TO THE AFFECTED AREA TWICE DAILY IF NEEDED    Tinea corporis       * ATHLETES FOOT 2 % powder   Generic drug:  miconazole      APPLY TOPICALLY AA BID IF NEEDED        metFORMIN 500 MG tablet    GLUCOPHAGE    90 tablet    Take 1 tablet (500 mg) by mouth daily (with breakfast)    Type 2 diabetes mellitus without complication, without  long-term current use of insulin (H)       metoprolol succinate 200 MG 24 hr tablet    TOPROL-XL    90 tablet    TAKE 1 TABLET(200 MG) BY MOUTH DAILY    Hypertension       omeprazole 20 MG CR capsule    priLOSEC    90 capsule    TAKE 1 CAPSULE(20 MG) BY MOUTH DAILY BEFORE A MEAL    H/O: GI bleed       order for DME      Urinary incontinence pad / panty liner        prazosin 5 MG capsule    MINIPRESS    270 capsule    TAKE 2 CAPSULES BY MOUTH EVERY MORNING AND 1 CAPSULE IN THE EVENING    Essential hypertension       triamterene-hydrochlorothiazide 37.5-25 MG per capsule    DYAZIDE    90 capsule    TAKE 1 CAPSULE BY MOUTH EVERY MORNING    Essential hypertension       * Notice:  This list has 2 medication(s) that are the same as other medications prescribed for you. Read the directions carefully, and ask your doctor or other care provider to review them with you.

## 2018-11-14 NOTE — LETTER
Lety Luna  [address] [address]    11/14/2018      Dear Ms. Luna,      We've received the results back from the laboratory for the samples you gave in clinic.  Your labs are stable. Please contact us at [phone] with any questions or concerns that you have.    I attached your lab results for your records.        Take Care,         Prachi Vargas PA-C    Resulted Orders   Basic Metabolic Panel   Result Value Ref Range    Sodium 139 134 - 144 mmol/L    Potassium 5.2 (H) 3.5 - 5.1 mmol/L    Chloride 103 98 - 107 mmol/L    Carbon Dioxide 29 21 - 31 mmol/L    Anion Gap 7 3 - 14 mmol/L    Glucose 124 (H) 70 - 105 mg/dL    Urea Nitrogen 24 7 - 25 mg/dL    Creatinine 1.47 (H) 0.60 - 1.20 mg/dL    GFR Estimate 33 (L) >60 mL/min/1.7m2    GFR Estimate If Black 40 (L) >60 mL/min/1.7m2    Calcium 8.9 8.6 - 10.3 mg/dL   Hemoglobin A1c   Result Value Ref Range    Hemoglobin A1C 6.0 4.0 - 6.0 %

## 2019-02-26 DIAGNOSIS — I10 BENIGN ESSENTIAL HYPERTENSION: ICD-10-CM

## 2019-02-27 RX ORDER — CAPTOPRIL 50 MG/1
TABLET ORAL
Qty: 60 TABLET | Refills: 0 | Status: SHIPPED | OUTPATIENT
Start: 2019-02-27 | End: 2019-03-22

## 2019-02-27 NOTE — TELEPHONE ENCOUNTER
"Refill request from [facility] for:  captopril (CAPOTEN) 50 MG tablet  Last filled for 12 month supply 3/9/2018 for 180 X 3 refills   LOV 11/14/2018 with KENYON De Oliveira-    Noted upcoming appt 3/13/2019 with PCP    Contacted pt and she states she will run out of medication prior to appt.    Limited refill supplied and note added to appt as reminder to fill as appropriate.     Requested Prescriptions   Pending Prescriptions Disp Refills     captopril (CAPOTEN) 50 MG tablet [Pharmacy Med Name: CAPTOPRIL 50MG TABLETS] 180 tablet 0     Sig: TAKE 1 TABLET(50 MG) BY MOUTH TWICE DAILY    ACE Inhibitors (Including Combos) Protocol Failed - 2/27/2019  3:52 PM       Failed - Normal serum creatinine on file in past 12 months    Recent Labs   Lab Test 11/14/18  0939   CR 1.47*            Failed - Normal serum potassium on file in past 12 months    Recent Labs   Lab Test 11/14/18  0939   POTASSIUM 5.2*            Passed - Blood pressure under 140/90 in past 12 months    BP Readings from Last 3 Encounters:   11/14/18 102/82   11/02/18 118/70   10/17/18 110/70                Passed - Recent (12 mo) or future (30 days) visit within the authorizing provider's specialty    Patient had office visit in the last 12 months or has a visit in the next 30 days with authorizing provider or within the authorizing provider's specialty.  See \"Patient Info\" tab in inbasket, or \"Choose Columns\" in Meds & Orders section of the refill encounter.             Passed - Medication is active on med list       Passed - Patient is age 18 or older       Passed - No active pregnancy on record       Passed - No positive pregnancy test within past 12 months        Megan Moreno RN  ....................  2/27/2019   4:00 PM      "

## 2019-03-22 ENCOUNTER — OFFICE VISIT (OUTPATIENT)
Dept: INTERNAL MEDICINE | Facility: OTHER | Age: 84
End: 2019-03-22
Attending: INTERNAL MEDICINE
Payer: MEDICARE

## 2019-03-22 VITALS
TEMPERATURE: 97.2 F | HEART RATE: 84 BPM | BODY MASS INDEX: 33.75 KG/M2 | SYSTOLIC BLOOD PRESSURE: 132 MMHG | DIASTOLIC BLOOD PRESSURE: 84 MMHG | WEIGHT: 178.6 LBS | RESPIRATION RATE: 18 BRPM

## 2019-03-22 DIAGNOSIS — D75.89 MACROCYTOSIS WITHOUT ANEMIA: ICD-10-CM

## 2019-03-22 DIAGNOSIS — I48.20 CHRONIC ATRIAL FIBRILLATION (H): ICD-10-CM

## 2019-03-22 DIAGNOSIS — B37.9 CANDIDIASIS: ICD-10-CM

## 2019-03-22 DIAGNOSIS — R60.0 PERIPHERAL EDEMA: ICD-10-CM

## 2019-03-22 DIAGNOSIS — E11.9 TYPE 2 DIABETES MELLITUS WITHOUT COMPLICATION, WITHOUT LONG-TERM CURRENT USE OF INSULIN (H): Primary | ICD-10-CM

## 2019-03-22 DIAGNOSIS — M1A.9XX0 CHRONIC GOUT WITHOUT TOPHUS, UNSPECIFIED CAUSE, UNSPECIFIED SITE: ICD-10-CM

## 2019-03-22 DIAGNOSIS — I10 BENIGN ESSENTIAL HYPERTENSION: ICD-10-CM

## 2019-03-22 DIAGNOSIS — I42.9 CARDIOMYOPATHY, UNSPECIFIED TYPE (H): ICD-10-CM

## 2019-03-22 DIAGNOSIS — D64.9 CHRONIC ANEMIA: ICD-10-CM

## 2019-03-22 DIAGNOSIS — N18.30 CHRONIC KIDNEY DISEASE, STAGE III (MODERATE) (H): ICD-10-CM

## 2019-03-22 DIAGNOSIS — I10 ESSENTIAL HYPERTENSION: ICD-10-CM

## 2019-03-22 LAB
ALBUMIN SERPL-MCNC: 3.8 G/DL (ref 3.5–5.7)
ALP SERPL-CCNC: 63 U/L (ref 34–104)
ALT SERPL W P-5'-P-CCNC: 6 U/L (ref 7–52)
ANION GAP SERPL CALCULATED.3IONS-SCNC: 7 MMOL/L (ref 3–14)
AST SERPL W P-5'-P-CCNC: 11 U/L (ref 13–39)
BILIRUB SERPL-MCNC: 0.5 MG/DL (ref 0.3–1)
BUN SERPL-MCNC: 33 MG/DL (ref 7–25)
CALCIUM SERPL-MCNC: 9 MG/DL (ref 8.6–10.3)
CHLORIDE SERPL-SCNC: 101 MMOL/L (ref 98–107)
CHOLEST SERPL-MCNC: 131 MG/DL
CO2 SERPL-SCNC: 30 MMOL/L (ref 21–31)
CREAT SERPL-MCNC: 1.84 MG/DL (ref 0.6–1.2)
ERYTHROCYTE [DISTWIDTH] IN BLOOD BY AUTOMATED COUNT: 12.4 % (ref 10–15)
GFR SERPL CREATININE-BSD FRML MDRD: 26 ML/MIN/{1.73_M2}
GLUCOSE SERPL-MCNC: 130 MG/DL (ref 70–105)
HBA1C MFR BLD: 6.3 % (ref 4–6)
HCT VFR BLD AUTO: 38 % (ref 35–47)
HDLC SERPL-MCNC: 29 MG/DL (ref 23–92)
HGB BLD-MCNC: 11.8 G/DL (ref 11.7–15.7)
LDLC SERPL CALC-MCNC: 56 MG/DL
MCH RBC QN AUTO: 32.2 PG (ref 26.5–33)
MCHC RBC AUTO-ENTMCNC: 31.1 G/DL (ref 31.5–36.5)
MCV RBC AUTO: 104 FL (ref 78–100)
NONHDLC SERPL-MCNC: 102 MG/DL
PLATELET # BLD AUTO: 168 10E9/L (ref 150–450)
POTASSIUM SERPL-SCNC: 4.7 MMOL/L (ref 3.5–5.1)
PROT SERPL-MCNC: 7.4 G/DL (ref 6.4–8.9)
RBC # BLD AUTO: 3.67 10E12/L (ref 3.8–5.2)
SODIUM SERPL-SCNC: 138 MMOL/L (ref 134–144)
TRIGL SERPL-MCNC: 232 MG/DL
TSH SERPL DL<=0.05 MIU/L-ACNC: 1.89 IU/ML (ref 0.34–5.6)
VIT B12 SERPL-MCNC: 558 PG/ML (ref 180–914)
WBC # BLD AUTO: 6.7 10E9/L (ref 4–11)

## 2019-03-22 PROCEDURE — G0463 HOSPITAL OUTPT CLINIC VISIT: HCPCS

## 2019-03-22 PROCEDURE — 80053 COMPREHEN METABOLIC PANEL: CPT | Performed by: INTERNAL MEDICINE

## 2019-03-22 PROCEDURE — 85027 COMPLETE CBC AUTOMATED: CPT | Performed by: INTERNAL MEDICINE

## 2019-03-22 PROCEDURE — 83036 HEMOGLOBIN GLYCOSYLATED A1C: CPT | Performed by: INTERNAL MEDICINE

## 2019-03-22 PROCEDURE — 84443 ASSAY THYROID STIM HORMONE: CPT | Performed by: INTERNAL MEDICINE

## 2019-03-22 PROCEDURE — 36415 COLL VENOUS BLD VENIPUNCTURE: CPT | Performed by: INTERNAL MEDICINE

## 2019-03-22 PROCEDURE — 80061 LIPID PANEL: CPT | Performed by: INTERNAL MEDICINE

## 2019-03-22 PROCEDURE — 99215 OFFICE O/P EST HI 40 MIN: CPT | Performed by: INTERNAL MEDICINE

## 2019-03-22 PROCEDURE — 82607 VITAMIN B-12: CPT | Performed by: INTERNAL MEDICINE

## 2019-03-22 RX ORDER — CAPTOPRIL 50 MG/1
50 TABLET ORAL 2 TIMES DAILY
Qty: 180 TABLET | Refills: 3 | Status: SHIPPED | OUTPATIENT
Start: 2019-03-22 | End: 2019-12-19

## 2019-03-22 ASSESSMENT — ENCOUNTER SYMPTOMS
DYSURIA: 0
SINUS PRESSURE: 0
COUGH: 0
CHILLS: 0
WEAKNESS: 0
EYE REDNESS: 0
DIAPHORESIS: 0
NAUSEA: 0
RHINORRHEA: 0
SORE THROAT: 0
TROUBLE SWALLOWING: 0
BRUISES/BLEEDS EASILY: 0
DIFFICULTY URINATING: 0
SLEEP DISTURBANCE: 0
DIZZINESS: 0
ABDOMINAL PAIN: 0
PALPITATIONS: 0
CONFUSION: 0
CONSTIPATION: 0
DIARRHEA: 0
FATIGUE: 0
EYE PAIN: 0
BACK PAIN: 0
SHORTNESS OF BREATH: 0
TREMORS: 0
NECK PAIN: 0
FACIAL SWELLING: 0
HALLUCINATIONS: 0
BLOOD IN STOOL: 0
SINUS PAIN: 0
JOINT SWELLING: 0
NECK STIFFNESS: 0
ADENOPATHY: 0
VOMITING: 0
ABDOMINAL DISTENTION: 0
AGITATION: 0
HEMATURIA: 0
WHEEZING: 0
FREQUENCY: 0
FEVER: 0
NUMBNESS: 0
ARTHRALGIAS: 1
SEIZURES: 0
HEADACHES: 0
FLANK PAIN: 0
CHEST TIGHTNESS: 0

## 2019-03-22 ASSESSMENT — PATIENT HEALTH QUESTIONNAIRE - PHQ9: SUM OF ALL RESPONSES TO PHQ QUESTIONS 1-9: 0

## 2019-03-22 NOTE — LETTER
March 22, 2019      Lety Luna  [address]  [place] [address]        Dear Lety Luna,    Below are the results of your recent labs:    Results for orders placed or performed in visit on 03/22/19   Comprehensive metabolic panel (BMP + Alb, Alk Phos, ALT, AST, Total. Bili, TP)   Result Value Ref Range    Sodium 138 134 - 144 mmol/L    Potassium 4.7 3.5 - 5.1 mmol/L    Chloride 101 98 - 107 mmol/L    Carbon Dioxide 30 21 - 31 mmol/L    Anion Gap 7 3 - 14 mmol/L    Glucose 130 (H) 70 - 105 mg/dL    Urea Nitrogen 33 (H) 7 - 25 mg/dL    Creatinine 1.84 (H) 0.60 - 1.20 mg/dL    GFR Estimate 26 (L) >60 mL/min/[1.73_m2]    GFR Estimate If Black 31 (L) >60 mL/min/[1.73_m2]    Calcium 9.0 8.6 - 10.3 mg/dL    Bilirubin Total 0.5 0.3 - 1.0 mg/dL    Albumin 3.8 3.5 - 5.7 g/dL    Protein Total 7.4 6.4 - 8.9 g/dL    Alkaline Phosphatase 63 34 - 104 U/L    ALT 6 (L) 7 - 52 U/L    AST 11 (L) 13 - 39 U/L   Lipid Profile (Chol, Trig, HDL, LDL calc)   Result Value Ref Range    Cholesterol 131 <200 mg/dL    Triglycerides 232 (H) <150 mg/dL    HDL Cholesterol 29 23 - 92 mg/dL    LDL Cholesterol Calculated 56 <100 mg/dL    Non HDL Cholesterol 102 <130 mg/dL   Hemoglobin A1c   Result Value Ref Range    Hemoglobin A1C 6.3 (H) 4.0 - 6.0 %   Thyrotropin GH   Result Value Ref Range    Thyrotropin 1.89 0.34 - 5.60 IU/mL   CBC with platelets   Result Value Ref Range    WBC 6.7 4.0 - 11.0 10e9/L    RBC Count 3.67 (L) 3.8 - 5.2 10e12/L    Hemoglobin 11.8 11.7 - 15.7 g/dL    Hematocrit 38.0 35.0 - 47.0 %     (H) 78 - 100 fl    MCH 32.2 26.5 - 33.0 pg    MCHC 31.1 (L) 31.5 - 36.5 g/dL    RDW 12.4 10.0 - 15.0 %    Platelet Count 168 150 - 450 10e9/L   Vitamin B12   Result Value Ref Range    Vitamin B12 558 180 - 914 pg/mL        The main finding on your blood tests is the fact that your kidneys have gotten weaker.  This might be because you are not drinking enough fluids.  Make sure that you stay well-hydrated.   Because of your kidney weakness, you need to stop your diabetes pill.  I will want you to return in 1 month so that we can recheck your kidneys and see how your diabetes is doing off of the metformin.  All of the rest of your blood tests look fine.  If you have any questions or concerns, we can review this when you return for a follow-up.    Sincerely,        Rodney Nelson MD  Internal Medicine  [facility]

## 2019-03-22 NOTE — NURSING NOTE
"The patient is here today to get a refill on her medications.  Jessica Correa LPN on 3/22/2019 at 8:54 AM  Chief Complaint   Patient presents with     Recheck Medication       Initial /84 (BP Location: Right arm, Patient Position: Sitting, Cuff Size: Adult Regular)   Pulse 84   Temp 97.2  F (36.2  C) (Temporal)   Resp 18   Wt 81 kg (178 lb 9.6 oz)   Breastfeeding? No   BMI 33.75 kg/m   Estimated body mass index is 33.75 kg/m  as calculated from the following:    Height as of 9/21/18: 1.549 m (5' 1\").    Weight as of this encounter: 81 kg (178 lb 9.6 oz).  Medication Reconciliation: complete    Jessica Correa LPN    "

## 2019-03-22 NOTE — PROGRESS NOTES
Chief Complaint:  This patient is here for a comprehensive review of their multiple medical problems, renewal of medications and update on necessary health maintenance issues.      HPI: She comes in today for a complete evaluation.  She tells me that in most respects she is feeling well.  Her only complaints are related to arthritic problems.  This includes her neck as well as her right hip.  Last summer we did an x-ray of her neck which did show significant arthritis as well as a C6 compression fracture.  She fell about a month ago and seemed to stir this up.  She is using some Tylenol and some heat and that does seem to help.  I recommended against any other stronger medications for this including anti-inflammatories.  She is willing to continue with that and she may also try some topical ointments.    Patient has a history of chronic atrial fibrillation.  She is not anticoagulated because she has a history of GI bleeding for which she has refused evaluation.  She is maintained on a baby aspirin a day which is probably not really effective for the atrial fibrillation and stroke prevention but all things considered, I did recommend that she continue on this every day.  It does not cause her any problems at this time.  Obviously if it caused any bleeding or GI upset or other, we would stop the baby aspirin as well.    She has type 2 diabetes mellitus.  She is on single dose metformin for control of her diabetes.  Historically, she has had excellent control.  Her blood pressure is well controlled with her numerous antihypertensives.  Some of these are also managed for rate control for her atrial fibrillation.  She has a history of gout which is controlled with low-dose allopurinol.    She has a continued problem with intertriginous yeast.  At the present time she is just using some local cares including daily washing and baby powder.  Other powders have been tried but they do not seem to be effective or helpful for  her, some of them actually were too strong and gave her a secondary rash.    She comes in today quite excited about the fact that she has been successful with losing weight.  A year ago she was at 190 pounds, today she is 178 pounds.  I am hoping that this is not a pathologic weight loss but she really denies anything that would be associated with that.    Medications are reconciled.  Past medical history, past surgical history, family history and social histories are all reviewed and updated today.    Past Medical History:   Diagnosis Date     Atrial fibrillation (H)     No anticoagulation due to GI bleed     Chronic kidney disease, stage III (moderate) (H)     No Comments Provided     Developmental disorder of scholastic skills     No Comments Provided     Essential (primary) hypertension     No Comments Provided     Gastrointestinal hemorrhage     2012,3 units pRBC     Gout     No Comments Provided     Hyperlipidemia     No Comments Provided     Obesity     No Comments Provided     Type 2 diabetes mellitus without complications (H)     No Comments Provided       Past Surgical History:   Procedure Laterality Date     APPENDECTOMY OPEN       DILATION AND CURETTAGE       EXTRACAPSULAR CATARACT EXTRATION WITH INTRAOCULAR LENS IMPLANT Bilateral      LAPAROSCOPIC TUBAL LIGATION         Current Outpatient Medications   Medication Sig Dispense Refill     allopurinol (ZYLOPRIM) 100 MG tablet TAKE 1 TABLET(100 MG) BY MOUTH DAILY 90 tablet 3     aspirin 81 MG tablet Take 81 mg by mouth daily with food       ATHLETES FOOT 2 % powder APPLY TOPICALLY AA BID IF NEEDED  2     captopril (CAPOTEN) 50 MG tablet Take 1 tablet (50 mg) by mouth 2 times daily 180 tablet 3     CARTIA  MG 24 hr capsule TAKE 1 CAPSULE(240 MG) BY MOUTH DAILY 90 capsule 3     clotrimazole (LOTRIMIN) 1 % cream Apply topically 2 times daily 60 g 11     fluconazole (DIFLUCAN) 150 MG tablet Take 1 tab PO q week prn rash 4 tablet 0     furosemide (LASIX)  20 MG tablet Take 1 tablet by mouth daily as needed for edema       LOTRIMIN AF 2 % powder APPLY TOPICALLY TO THE AFFECTED AREA TWICE DAILY IF NEEDED 90 g 3     metFORMIN (GLUCOPHAGE) 500 MG tablet Take 1 tablet (500 mg) by mouth daily (with breakfast) 90 tablet 1     metoprolol succinate (TOPROL-XL) 200 MG 24 hr tablet TAKE 1 TABLET(200 MG) BY MOUTH DAILY 90 tablet 1     omeprazole (PRILOSEC) 20 MG CR capsule TAKE 1 CAPSULE(20 MG) BY MOUTH DAILY BEFORE A MEAL 90 capsule 3     order for DME Urinary incontinence pad / panty liner       prazosin (MINIPRESS) 5 MG capsule TAKE 2 CAPSULES BY MOUTH EVERY MORNING AND 1 CAPSULE IN THE EVENING 270 capsule 3     triamterene-hydrochlorothiazide (DYAZIDE) 37.5-25 MG per capsule TAKE 1 CAPSULE BY MOUTH EVERY MORNING 90 capsule 3       No Known Allergies    Family History   Problem Relation Age of Onset     Hypertension Brother      Heart Disease Brother      Other - See Comments Brother         intracerebral hemorrhage/ obesity     Diabetes Sister      Diabetes Sister      Other - See Comments Sister          from pneumonia     Diabetes Father      Heart Disease Father      Diabetes Mother      Heart Disease Mother      Diabetes Son         Diabetes     Heart Disease Brother        Social History     Socioeconomic History     Marital status: Unknown     Spouse name: Not on file     Number of children: Not on file     Years of education: Not on file     Highest education level: Not on file   Occupational History     Not on file   Social Needs     Financial resource strain: Not on file     Food insecurity:     Worry: Not on file     Inability: Not on file     Transportation needs:     Medical: Not on file     Non-medical: Not on file   Tobacco Use     Smoking status: Never Smoker     Smokeless tobacco: Never Used   Substance and Sexual Activity     Alcohol use: No     Drug use: No     Comment: Drug use: No     Sexual activity: Not on file   Lifestyle     Physical activity:      Days per week: Not on file     Minutes per session: Not on file     Stress: Not on file   Relationships     Social connections:     Talks on phone: Not on file     Gets together: Not on file     Attends [employer] service: Not on file     Active member of club or organization: Not on file     Attends meetings of clubs or organizations: Not on file     Relationship status: Not on file     Intimate partner violence:     Fear of current or ex partner: Not on file     Emotionally abused: Not on file     Physically abused: Not on file     Forced sexual activity: Not on file   Other Topics Concern     Parent/sibling w/ CABG, MI or angioplasty before 65F 55M? Not Asked   Social History Narrative    Cigarettes-none.  Alcohol-none.   and lives in an apartment in town.       Review of Systems   Constitutional: Negative for chills, diaphoresis, fatigue and fever.   HENT: Negative for congestion, ear pain, facial swelling, mouth sores, nosebleeds, rhinorrhea, sinus pressure, sinus pain, sore throat and trouble swallowing.    Eyes: Negative for pain, redness and visual disturbance.   Respiratory: Negative for cough, chest tightness, shortness of breath and wheezing.    Cardiovascular: Negative for chest pain, palpitations and leg swelling.   Gastrointestinal: Negative for abdominal distention, abdominal pain, blood in stool, constipation, diarrhea, nausea and vomiting.   Endocrine: Negative for cold intolerance and heat intolerance.   Genitourinary: Negative for difficulty urinating, dysuria, flank pain, frequency, hematuria, pelvic pain, vaginal bleeding, vaginal discharge and vaginal pain.   Musculoskeletal: Positive for arthralgias. Negative for back pain, gait problem, joint swelling, neck pain and neck stiffness.   Skin: Negative for rash.   Neurological: Negative for dizziness, tremors, seizures, syncope, weakness, numbness and headaches.   Hematological: Negative for adenopathy. Does not bruise/bleed easily.    Psychiatric/Behavioral: Negative for agitation, confusion, hallucinations and sleep disturbance.       Physical Exam   Constitutional: She is oriented to person, place, and time. She appears well-developed and well-nourished. No distress.   HENT:   Head: Normocephalic.   Right Ear: External ear normal.   Left Ear: External ear normal.   Mouth/Throat: Oropharynx is clear and moist. No oropharyngeal exudate.   Eyes: Conjunctivae are normal. Pupils are equal, round, and reactive to light.   Neck: Normal range of motion. Neck supple. Normal carotid pulses and no JVD present. Carotid bruit is not present. No tracheal deviation present. No thyromegaly present.   Cardiovascular: Normal rate and normal heart sounds. An irregularly irregular rhythm present. Exam reveals no gallop and no friction rub.   No murmur heard.  Pulmonary/Chest: Effort normal and breath sounds normal. No respiratory distress. She has no wheezes. She has no rales.   Abdominal: Soft. Bowel sounds are normal. She exhibits no distension and no mass. There is no tenderness. There is no rebound.   Musculoskeletal: Normal range of motion. She exhibits no edema.   Thoracic kyphosis   Lymphadenopathy:     She has no cervical adenopathy.   Neurological: She is alert and oriented to person, place, and time. She has normal reflexes. No cranial nerve deficit. Coordination normal.   Skin: Skin is warm and dry. No rash noted. She is not diaphoretic.   Intertriginous tinea infection and irritation under her breasts   Psychiatric: She has a normal mood and affect. Her behavior is normal.   Nursing note and vitals reviewed.      Assessment:      ICD-10-CM    1. Type 2 diabetes mellitus without complication, without long-term current use of insulin (H) E11.9 Comprehensive Metabolic Panel     Lipid Panel     Hemoglobin A1c     TSH   2. Chronic gout without tophus, unspecified cause, unspecified site [id number]    3. Chronic atrial fibrillation (H) I48.2    4.  Cardiomyopathy, unspecified type (H) I42.9    5. Essential hypertension I10    6. Peripheral edema R60.9    7. Chronic kidney disease, stage III (moderate) (H) N18.3    8. Chronic anemia D64.9 CBC W PLT No Diff   9. Candidiasis B37.9    10. Benign essential hypertension I10 captopril (CAPOTEN) 50 MG tablet        Plan: She appears to be doing well at this time.  Continued symptomatic measures for her arthritic aches and pains.  Continue local cares for her tinea infection.  Complete lab drawn and pending, I will send a letter with the results and any recommendation for change as well as recommendation for follow-up.  Likely I will have her back in 6 months for a follow-up and last lab indicates that we should see her sooner.  Obviously follow-up sooner if there are problems.  Continue baby aspirin therapy for now.  All other health measures are up-to-date given her age of 89.

## 2019-03-31 DIAGNOSIS — I10 HYPERTENSION: ICD-10-CM

## 2019-04-03 RX ORDER — METOPROLOL SUCCINATE 200 MG/1
TABLET, EXTENDED RELEASE ORAL
Qty: 90 TABLET | Refills: 3 | Status: SHIPPED | OUTPATIENT
Start: 2019-04-03 | End: 2020-03-18

## 2019-04-03 NOTE — TELEPHONE ENCOUNTER
Prescription approved per [facility] Refill Protocol.  Medications reviewed by PCP and continued on 3-22-19.   Ellen Bravo RN on 4/3/2019 at 10:35 AM

## 2019-04-15 ENCOUNTER — OFFICE VISIT (OUTPATIENT)
Dept: INTERNAL MEDICINE | Facility: OTHER | Age: 84
End: 2019-04-15
Attending: INTERNAL MEDICINE
Payer: MEDICARE

## 2019-04-15 VITALS
DIASTOLIC BLOOD PRESSURE: 74 MMHG | HEART RATE: 72 BPM | SYSTOLIC BLOOD PRESSURE: 122 MMHG | WEIGHT: 181.2 LBS | RESPIRATION RATE: 20 BRPM | BODY MASS INDEX: 34.24 KG/M2 | TEMPERATURE: 97.6 F

## 2019-04-15 DIAGNOSIS — Z79.4 TYPE 2 DIABETES MELLITUS WITHOUT COMPLICATION, WITH LONG-TERM CURRENT USE OF INSULIN (H): ICD-10-CM

## 2019-04-15 DIAGNOSIS — N18.30 CHRONIC KIDNEY DISEASE, STAGE III (MODERATE) (H): Primary | ICD-10-CM

## 2019-04-15 DIAGNOSIS — E11.9 TYPE 2 DIABETES MELLITUS WITHOUT COMPLICATION, WITH LONG-TERM CURRENT USE OF INSULIN (H): ICD-10-CM

## 2019-04-15 LAB
ANION GAP SERPL CALCULATED.3IONS-SCNC: 7 MMOL/L (ref 3–14)
BUN SERPL-MCNC: 24 MG/DL (ref 7–25)
CALCIUM SERPL-MCNC: 8.9 MG/DL (ref 8.6–10.3)
CHLORIDE SERPL-SCNC: 104 MMOL/L (ref 98–107)
CO2 SERPL-SCNC: 28 MMOL/L (ref 21–31)
CREAT SERPL-MCNC: 1.4 MG/DL (ref 0.6–1.2)
GFR SERPL CREATININE-BSD FRML MDRD: 35 ML/MIN/{1.73_M2}
GLUCOSE SERPL-MCNC: 124 MG/DL (ref 70–105)
POTASSIUM SERPL-SCNC: 4.8 MMOL/L (ref 3.5–5.1)
SODIUM SERPL-SCNC: 139 MMOL/L (ref 134–144)

## 2019-04-15 PROCEDURE — 80048 BASIC METABOLIC PNL TOTAL CA: CPT | Performed by: INTERNAL MEDICINE

## 2019-04-15 PROCEDURE — G0463 HOSPITAL OUTPT CLINIC VISIT: HCPCS

## 2019-04-15 PROCEDURE — 99213 OFFICE O/P EST LOW 20 MIN: CPT | Performed by: INTERNAL MEDICINE

## 2019-04-15 PROCEDURE — 36415 COLL VENOUS BLD VENIPUNCTURE: CPT | Performed by: INTERNAL MEDICINE

## 2019-04-15 ASSESSMENT — ENCOUNTER SYMPTOMS
CONSTITUTIONAL NEGATIVE: 1
EYES NEGATIVE: 1
ALLERGIC/IMMUNOLOGIC NEGATIVE: 1
ENDOCRINE NEGATIVE: 1

## 2019-04-15 ASSESSMENT — PATIENT HEALTH QUESTIONNAIRE - PHQ9: SUM OF ALL RESPONSES TO PHQ QUESTIONS 1-9: 0

## 2019-04-15 NOTE — NURSING NOTE
"Previous A1C is at goal of <8  Lab Results   Component Value Date    A1C 6.3 03/22/2019    A1C 6.0 11/14/2018    A1C 6.2 07/18/2018    A1C 6.1 03/13/2018     Urine microalbumin:creatine: na  Foot exam 3-2019  Eye exam 5-2018    Tobacco User na  Patient is on a daily aspirin  Patient is on a Statin.  Blood pressure today of:     BP Readings from Last 1 Encounters:   03/22/19 132/84      is at the goal of <139/89 for diabetics.    Jessica Correa LPN on 4/15/2019 at 8:41 AM    Chief Complaint   Patient presents with     Diabetes       Initial /74 (BP Location: Right arm, Patient Position: Sitting, Cuff Size: Adult Regular)   Pulse 72   Temp 97.6  F (36.4  C) (Tympanic)   Resp 20   Wt 82.2 kg (181 lb 3.2 oz)   Breastfeeding? No   BMI 34.24 kg/m   Estimated body mass index is 34.24 kg/m  as calculated from the following:    Height as of 9/21/18: 1.549 m (5' 1\").    Weight as of this encounter: 82.2 kg (181 lb 3.2 oz).  Medication Reconciliation: complete    Jessica Correa LPN    "

## 2019-04-15 NOTE — LETTER
April 15, 2019      Lety Luna  [address] [address]        Dear Lety Luna,    Below are the results of your recent labs:    Results for orders placed or performed in visit on 04/15/19   Basic Metabolic Panel   Result Value Ref Range    Sodium 139 134 - 144 mmol/L    Potassium 4.8 3.5 - 5.1 mmol/L    Chloride 104 98 - 107 mmol/L    Carbon Dioxide 28 21 - 31 mmol/L    Anion Gap 7 3 - 14 mmol/L    Glucose 124 (H) 70 - 105 mg/dL    Urea Nitrogen 24 7 - 25 mg/dL    Creatinine 1.40 (H) 0.60 - 1.20 mg/dL    GFR Estimate 35 (L) >60 mL/min/[1.73_m2]    GFR Estimate If Black 43 (L) >60 mL/min/[1.73_m2]    Calcium 8.9 8.6 - 10.3 mg/dL        As anticipated, your kidney function has improved by having you drinking more fluids.  Continue with that.  If all goes well, we will recheck your diabetes and July.    Sincerely,        Rodney Nelson MD  Internal Medicine  [facility]

## 2019-04-15 NOTE — PROGRESS NOTES
Chief Complaint:  F/u on kidney disease and DM.    HPI: She is here for follow-up on her renal function and her diabetes.  Her creatinine and GFR have deteriorated to the point where I no longer wanted her on the Metformin.  She stopped the Metformin.  I encouraged her to push fluids.  She is here today for recheck on her renal function.  Historically, her diabetes is under excellent control with even low-dose metformin so it is not likely that she will really need anything in its place but we will wait on that determination until mid year as she recently had an A1c and it was 6.3%.    Past Medical History:   Diagnosis Date     Atrial fibrillation (H)     No anticoagulation due to GI bleed     Chronic kidney disease, stage III (moderate) (H)     No Comments Provided     Developmental disorder of scholastic skills     No Comments Provided     Essential (primary) hypertension     No Comments Provided     Gastrointestinal hemorrhage     2012,3 units pRBC     Gout     No Comments Provided     Hyperlipidemia     No Comments Provided     Obesity     No Comments Provided     Type 2 diabetes mellitus without complications (H)     No Comments Provided       Past Surgical History:   Procedure Laterality Date     APPENDECTOMY OPEN       DILATION AND CURETTAGE       EXTRACAPSULAR CATARACT EXTRATION WITH INTRAOCULAR LENS IMPLANT Bilateral      LAPAROSCOPIC TUBAL LIGATION         No Known Allergies    Current Outpatient Medications   Medication Sig Dispense Refill     allopurinol (ZYLOPRIM) 100 MG tablet TAKE 1 TABLET(100 MG) BY MOUTH DAILY 90 tablet 3     aspirin 81 MG tablet Take 81 mg by mouth daily with food       ATHLETES FOOT 2 % powder APPLY TOPICALLY AA BID IF NEEDED  2     captopril (CAPOTEN) 50 MG tablet Take 1 tablet (50 mg) by mouth 2 times daily 180 tablet 3     CARTIA  MG 24 hr capsule TAKE 1 CAPSULE(240 MG) BY MOUTH DAILY 90 capsule 3     clotrimazole (LOTRIMIN) 1 % cream Apply topically 2 times daily 60 g 11      fluconazole (DIFLUCAN) 150 MG tablet Take 1 tab PO q week prn rash 4 tablet 0     furosemide (LASIX) 20 MG tablet Take 1 tablet by mouth daily as needed for edema       LOTRIMIN AF 2 % powder APPLY TOPICALLY TO THE AFFECTED AREA TWICE DAILY IF NEEDED 90 g 3     metoprolol succinate ER (TOPROL-XL) 200 MG 24 hr tablet TAKE 1 TABLET(200 MG) BY MOUTH DAILY 90 tablet 3     omeprazole (PRILOSEC) 20 MG CR capsule TAKE 1 CAPSULE(20 MG) BY MOUTH DAILY BEFORE A MEAL 90 capsule 3     order for DME Urinary incontinence pad / panty liner       prazosin (MINIPRESS) 5 MG capsule TAKE 2 CAPSULES BY MOUTH EVERY MORNING AND 1 CAPSULE IN THE EVENING 270 capsule 3     triamterene-hydrochlorothiazide (DYAZIDE) 37.5-25 MG per capsule TAKE 1 CAPSULE BY MOUTH EVERY MORNING 90 capsule 3       Review of Systems   Constitutional: Negative.    Eyes: Negative.    Endocrine: Negative.    Allergic/Immunologic: Negative.        Physical Exam   Constitutional: She appears well-developed and well-nourished. No distress.   Skin: She is not diaphoretic.   Nursing note and vitals reviewed.      Assessment:        ICD-10-CM    1. Chronic kidney disease, stage III (moderate) (H) N18.3 Basic Metabolic Panel   2. Type 2 diabetes mellitus without complication, with long-term current use of insulin (H) E11.9     Z79.4        Plan: Renal function is pending, I will send a letter with the results and any recommendations.  This of course will have a glucose with it as well.  Presumably it will look stable to improved.  If not, we may have to intervene.  Assuming that it does look stable to improved, she will be back for a diabetes check and A1c sometime in July to determine whether or not additional or alternate diabetic oral therapy is necessary.

## 2019-05-13 ENCOUNTER — APPOINTMENT (OUTPATIENT)
Dept: CT IMAGING | Facility: OTHER | Age: 84
End: 2019-05-13
Attending: FAMILY MEDICINE
Payer: MEDICARE

## 2019-05-13 ENCOUNTER — HOSPITAL ENCOUNTER (EMERGENCY)
Facility: OTHER | Age: 84
Discharge: HOME | End: 2019-05-13
Attending: FAMILY MEDICINE | Admitting: FAMILY MEDICINE
Payer: MEDICARE

## 2019-05-13 VITALS
RESPIRATION RATE: 10 BRPM | DIASTOLIC BLOOD PRESSURE: 98 MMHG | SYSTOLIC BLOOD PRESSURE: 132 MMHG | HEART RATE: 87 BPM | TEMPERATURE: 98.7 F | OXYGEN SATURATION: 95 %
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
## 2019-05-13 PROCEDURE — 99285 EMERGENCY DEPT VISIT HI MDM: CPT | Mod: 25 | Performed by: FAMILY MEDICINE

## 2019-05-13 PROCEDURE — 93005 ELECTROCARDIOGRAM TRACING: CPT | Performed by: FAMILY MEDICINE

## 2019-05-13 PROCEDURE — 70450 CT HEAD/BRAIN W/O DYE: CPT

## 2019-05-13 PROCEDURE — 99283 EMERGENCY DEPT VISIT LOW MDM: CPT | Mod: Z6 | Performed by: FAMILY MEDICINE

## 2019-05-13 PROCEDURE — 93010 ELECTROCARDIOGRAM REPORT: CPT | Performed by: INTERNAL MEDICINE

## 2019-05-13 PROCEDURE — 72125 CT NECK SPINE W/O DYE: CPT

## 2019-05-13 NOTE — ED PROVIDER NOTES
History     Chief Complaint   Patient presents with     Fall     HPI  Lety Luna is a 89 year old female who presents to the emergency room she was cleaning up at the sink and she fell backwards after she lost her balance.  She denied any chest pain shortness of breath.  She stated she basically fell backwards and landed on her butt and then went backward and hit the back of her head on the edge of the shower.  She denies any loss of consciousness.  She was not able initially to get up just because she could not have the strength to get up and she went over to the sink he was able to pull herself up and call her family to bring her to the emergency room.  She denies any sick specific injuries.  He denies any pain in the back of her head or her neck.  She denies any injury to her lower extremities and is very comfortable in the bed.  Lives alone and was trying to wash up because she has some pretty severe inflammation under both breasts that appear to be yeast infections.  She has tried nystatin powder in the past and does not like it is so is now using just baby powder to try to keep it as dry as possible.    Allergies:  No Known Allergies    Problem List:    Patient Active Problem List    Diagnosis Date Noted     Diabetes mellitus, type II (H) 01/23/2018     Priority: Medium     Gout 01/23/2018     Priority: Medium     Hypertension 01/23/2018     Priority: Medium     Hypertriglyceridemia 01/23/2018     Priority: Medium     Learning disability 01/23/2018     Priority: Medium     Obesity 01/23/2018     Priority: Medium     Peripheral edema 09/19/2017     Priority: Medium     Chronic anemia 07/22/2015     Priority: Medium     Cardiomyopathy (H) 09/16/2014     Priority: Medium     Overview:   Probable rate related from atrial fibrillation       Candidiasis 01/21/2012     Priority: Medium     Arthropathy 10/04/2010     Priority: Medium     Atrial fibrillation (H) 02/09/2010     Priority: Medium     Chronic kidney  disease, stage III (moderate) (H) 2010     Priority: Medium        Past Medical History:    Past Medical History:   Diagnosis Date     Atrial fibrillation (H)      Chronic kidney disease, stage III (moderate) (H)      Developmental disorder of scholastic skills      Essential (primary) hypertension      Gastrointestinal hemorrhage      Gout      Hyperlipidemia      Obesity      Type 2 diabetes mellitus without complications (H)        Past Surgical History:    Past Surgical History:   Procedure Laterality Date     APPENDECTOMY OPEN       DILATION AND CURETTAGE       EXTRACAPSULAR CATARACT EXTRATION WITH INTRAOCULAR LENS IMPLANT Bilateral      LAPAROSCOPIC TUBAL LIGATION         Family History:    Family History   Problem Relation Age of Onset     Hypertension Brother      Heart Disease Brother      Other - See Comments Brother         intracerebral hemorrhage/ obesity     Diabetes Sister      Diabetes Sister      Other - See Comments Sister          from pneumonia     Diabetes Father      Heart Disease Father      Diabetes Mother      Heart Disease Mother      Diabetes Son         Diabetes     Heart Disease Brother        Social History:  Marital Status:   [5]  Social History     Tobacco Use     Smoking status: Never Smoker     Smokeless tobacco: Never Used   Substance Use Topics     Alcohol use: No     Drug use: No     Comment: Drug use: No        Medications:      allopurinol (ZYLOPRIM) 100 MG tablet   aspirin 81 MG tablet   ATHLETES FOOT 2 % powder   captopril (CAPOTEN) 50 MG tablet   CARTIA  MG 24 hr capsule   clotrimazole (LOTRIMIN) 1 % cream   fluconazole (DIFLUCAN) 150 MG tablet   furosemide (LASIX) 20 MG tablet   LOTRIMIN AF 2 % powder   metoprolol succinate ER (TOPROL-XL) 200 MG 24 hr tablet   omeprazole (PRILOSEC) 20 MG CR capsule   order for DME   prazosin (MINIPRESS) 5 MG capsule   triamterene-hydrochlorothiazide (DYAZIDE) 37.5-25 MG per capsule         Review of Systems   All  other systems reviewed and are negative.      Physical Exam   BP: (!) 126/95  Pulse: 100  Heart Rate: 87  Temp: 98.7  F (37.1  C)  Resp: 18  SpO2: 96 %      Physical Exam   Constitutional: She is oriented to person, place, and time. She appears well-developed and well-nourished. No distress.   HENT:   Head: Normocephalic and atraumatic.   Right Ear: External ear normal.   Left Ear: External ear normal.   Nose: Nose normal.   Mouth/Throat: Oropharynx is clear and moist.   Tm's shiny and gray.   Eyes: Pupils are equal, round, and reactive to light. Conjunctivae and EOM are normal.   Neck: Normal range of motion. Neck supple. No thyromegaly present.   Nontender.Obvious kyphosis noted.    Cardiovascular: Normal rate, regular rhythm and normal heart sounds.   Pulmonary/Chest: Effort normal and breath sounds normal. No stridor. No respiratory distress. She has no wheezes.   Abdominal: Soft. Bowel sounds are normal. She exhibits no distension. There is no tenderness. There is no guarding.   Musculoskeletal: Normal range of motion.   Lymphadenopathy:     She has no cervical adenopathy.   Neurological: She is alert and oriented to person, place, and time.   Skin: Skin is warm and dry. Capillary refill takes less than 2 seconds.   Nursing note and vitals reviewed.      ED Course   Patient seen and examined.  Her CT of her head and neck.  EKG done by nursing showed atrial fibrillation right bundle branch with no change from 6/25/2013.  No concerning rhythms were noted.    CT of her head and neck were negative.  Family and patient were comfortable with discharge at this time.  She will follow-up with her primary regarding the yeast infection underneath her breasts she did not want to try nystatin.     Procedures      Results for orders placed or performed during the hospital encounter of 05/13/19 (from the past 24 hour(s))   CT Head w/o Contrast    Narrative    PROCEDURE: CT HEAD W/O CONTRAST     HISTORY: Head trauma, minor,  GCS>=13, low clinical risk, initial exam.    COMPARISON: None.    TECHNIQUE:  Helical images of the head from the foramen magnum to the  vertex were obtained without contrast.    FINDINGS: The ventricles and sulci are normal in volume. No acute  intracranial hemorrhage, mass effect, midline shift, hydrocephalus or  basilar cystern effacement are present.    The grey-white matter interface is preserved. There is some white  matter low-density consistent with small vessel disease.    The calvarium is intact. The mastoid air cells are clear.  There is  some mild mucosal thickening seen in the right maxillary sinus.      Impression    IMPRESSION: No acute brain injury      KATHLEEN FRANKLIN MD   CT Cervical Spine w/o Contrast    Narrative    PROCEDURE: CT CERVICAL SPINE W/O CONTRAST 5/13/2019 6:05 PM    HISTORY: C-spine trauma, ligamentous injury suspected    COMPARISONS: None.    Meds/Dose Given:    TECHNIQUE: CT scan of the cervical spine with sagittal coronal  reconstructions    FINDINGS: There are degenerative changes of the middle atlantoaxial  joint. The C2-C3 disc is normal in height. Mild posterior osteophytes  are seen at C4-C5. There is mild forward subluxation of C4 on C5 due  to facet joint degenerative change. There is decrease in height in the  C5-C6 C6-C7 discs with mild anterior posterior osteophytes. The C7-T1  disc appears normal. Advanced degenerative changes are seen in the  cervical facet joints. There is mild compression of the T3 vertebra  with mild wedging uncertain if this is acute or chronic. No cervical  spine fractures are noted. The prevertebral soft tissues appear normal         Impression    IMPRESSION: Mild wedging of T3 which may be acute or chronic. No acute  cervical fracture is seen.    KATHLEEN FRANKLIN MD       Medications - No data to display    Assessments & Plan (with Medical Decision Making)     I have reviewed the nursing notes.    I have reviewed the findings, diagnosis, plan  and need for follow up with the patient.  See discussion.        Medication List      There are no discharge medications for this visit.         Final diagnoses:   Fall, initial encounter       5/13/2019   [facility]  05/13/19 [id number]

## 2019-05-13 NOTE — ED TRIAGE NOTES
Pt slipped in the bathroom about 3:30pm.  Fell backwards and hit the back of her head and neck on the entrance to the shower.  Pt denies loss of consciousness.  Pt complaining of pain in the back of her head.  No neck tenderness.

## 2019-07-01 ENCOUNTER — OFFICE VISIT (OUTPATIENT)
Dept: INTERNAL MEDICINE | Facility: OTHER | Age: 84
End: 2019-07-01
Attending: INTERNAL MEDICINE
Payer: MEDICARE

## 2019-07-01 VITALS
BODY MASS INDEX: 34.16 KG/M2 | RESPIRATION RATE: 20 BRPM | SYSTOLIC BLOOD PRESSURE: 136 MMHG | DIASTOLIC BLOOD PRESSURE: 74 MMHG | WEIGHT: 180.8 LBS | HEART RATE: 80 BPM

## 2019-07-01 DIAGNOSIS — E11.9 TYPE 2 DIABETES MELLITUS WITHOUT COMPLICATION, WITH LONG-TERM CURRENT USE OF INSULIN (H): Primary | ICD-10-CM

## 2019-07-01 DIAGNOSIS — Z79.4 TYPE 2 DIABETES MELLITUS WITHOUT COMPLICATION, WITH LONG-TERM CURRENT USE OF INSULIN (H): Primary | ICD-10-CM

## 2019-07-01 LAB — HBA1C MFR BLD: 6.4 % (ref 4–6)

## 2019-07-01 PROCEDURE — 36415 COLL VENOUS BLD VENIPUNCTURE: CPT | Mod: ZL | Performed by: INTERNAL MEDICINE

## 2019-07-01 PROCEDURE — G0463 HOSPITAL OUTPT CLINIC VISIT: HCPCS

## 2019-07-01 PROCEDURE — 83036 HEMOGLOBIN GLYCOSYLATED A1C: CPT | Mod: ZL | Performed by: INTERNAL MEDICINE

## 2019-07-01 PROCEDURE — 99213 OFFICE O/P EST LOW 20 MIN: CPT | Performed by: INTERNAL MEDICINE

## 2019-07-01 ASSESSMENT — ENCOUNTER SYMPTOMS
ALLERGIC/IMMUNOLOGIC NEGATIVE: 1
CONSTITUTIONAL NEGATIVE: 1
EYES NEGATIVE: 1
ENDOCRINE NEGATIVE: 1

## 2019-07-01 ASSESSMENT — PATIENT HEALTH QUESTIONNAIRE - PHQ9: SUM OF ALL RESPONSES TO PHQ QUESTIONS 1-9: 0

## 2019-07-01 NOTE — PROGRESS NOTES
Chief Complaint:  Recheck on DM.    HPI: She comes in today for recheck on her diabetes.  I took her off of her metformin 3 months ago.  Her renal function had worsened so I did not want her to be on the metformin any longer.  Her A1c was 6.3% so I did not start another medication at that time.  I wanted to wait until today to see how her diabetes is without treatment to see whether we needed to add something in addition.  She feels well and has no complaints.  Her weight is stable.    Past Medical History:   Diagnosis Date     Atrial fibrillation (H)     No anticoagulation due to GI bleed     Chronic kidney disease, stage III (moderate) (H)     No Comments Provided     Developmental disorder of scholastic skills     No Comments Provided     Essential (primary) hypertension     No Comments Provided     Gastrointestinal hemorrhage     2012,3 units pRBC     Gout     No Comments Provided     Hyperlipidemia     No Comments Provided     Obesity     No Comments Provided     Type 2 diabetes mellitus without complications (H)     No Comments Provided       Past Surgical History:   Procedure Laterality Date     APPENDECTOMY OPEN       DILATION AND CURETTAGE       EXTRACAPSULAR CATARACT EXTRATION WITH INTRAOCULAR LENS IMPLANT Bilateral      LAPAROSCOPIC TUBAL LIGATION         No Known Allergies    Current Outpatient Medications   Medication Sig Dispense Refill     allopurinol (ZYLOPRIM) 100 MG tablet TAKE 1 TABLET(100 MG) BY MOUTH DAILY 90 tablet 3     aspirin 81 MG tablet Take 81 mg by mouth daily with food       ATHLETES FOOT 2 % powder APPLY TOPICALLY AA BID IF NEEDED  2     captopril (CAPOTEN) 50 MG tablet Take 1 tablet (50 mg) by mouth 2 times daily 180 tablet 3     CARTIA  MG 24 hr capsule TAKE 1 CAPSULE(240 MG) BY MOUTH DAILY 90 capsule 3     clotrimazole (LOTRIMIN) 1 % cream Apply topically 2 times daily 60 g 11     fluconazole (DIFLUCAN) 150 MG tablet Take 1 tab PO q week prn rash 4 tablet 0     furosemide  (LASIX) 20 MG tablet Take 1 tablet by mouth daily as needed for edema       LOTRIMIN AF 2 % powder APPLY TOPICALLY TO THE AFFECTED AREA TWICE DAILY IF NEEDED 90 g 3     metoprolol succinate ER (TOPROL-XL) 200 MG 24 hr tablet TAKE 1 TABLET(200 MG) BY MOUTH DAILY 90 tablet 3     omeprazole (PRILOSEC) 20 MG CR capsule TAKE 1 CAPSULE(20 MG) BY MOUTH DAILY BEFORE A MEAL 90 capsule 3     order for DME Urinary incontinence pad / panty liner       prazosin (MINIPRESS) 5 MG capsule TAKE 2 CAPSULES BY MOUTH EVERY MORNING AND 1 CAPSULE IN THE EVENING 270 capsule 3     triamterene-hydrochlorothiazide (DYAZIDE) 37.5-25 MG per capsule TAKE 1 CAPSULE BY MOUTH EVERY MORNING 90 capsule 3       Review of Systems   Constitutional: Negative.    Eyes: Negative.    Endocrine: Negative.    Allergic/Immunologic: Negative.        Physical Exam   Constitutional: She appears well-developed and well-nourished. No distress.   Skin: She is not diaphoretic.   Nursing note and vitals reviewed.      Assessment:        ICD-10-CM    1. Type 2 diabetes mellitus without complication, with long-term current use of insulin (H) E11.9 Hemoglobin A1c    Z79.4        Plan: A1c pending, I will send her a letter with the results including any recommendations for additional therapy as well as follow-up.

## 2019-07-01 NOTE — LETTER
July 1, 2019      Lety Luna  [address] [place] [id number]        Dear Lety,    Below are the results of your recent labs:    Results for orders placed or performed in visit on 07/01/19   Hemoglobin A1c   Result Value Ref Range    Hemoglobin A1C 6.4 (H) 4.0 - 6.0 %        Your diabetes test looks excellent.  You do not need a diabetic medication in place of the Metformin.  Congratulations on this excellent report.  If all goes well, I think you should return in 4 months for a recheck on your diabetes and your kidneys.    Sincerely,        Rodney Nelson MD  Internal Medicine  [facility]

## 2019-07-01 NOTE — NURSING NOTE
"Previous A1C is at goal of <8  Lab Results   Component Value Date    A1C 6.3 03/22/2019    A1C 6.0 11/14/2018    A1C 6.2 07/18/2018    A1C 6.1 03/13/2018     Urine microalbumin:creatine: N/A  Foot exam unknown  Eye exam 9-2018    Tobacco User no  Patient is on a daily aspirin  Patient is not on a Statin.  Blood pressure today of:     BP Readings from Last 1 Encounters:   05/13/19 (!) 132/98      is at the goal of <139/89 for diabetics.    Jessica Correa LPN on 7/1/2019 at 8:51 AM    Chief Complaint   Patient presents with     Diabetes       Initial /74 (BP Location: Right arm, Patient Position: Sitting, Cuff Size: Adult Regular)   Pulse 80   Resp 20   Wt 82 kg (180 lb 12.8 oz)   Breastfeeding? No   BMI 34.16 kg/m   Estimated body mass index is 34.16 kg/m  as calculated from the following:    Height as of 9/21/18: 1.549 m (5' 1\").    Weight as of this encounter: 82 kg (180 lb 12.8 oz).  Medication Reconciliation: complete    Jessica Correa LPN    "

## 2019-07-09 ENCOUNTER — TRANSFERRED RECORDS (OUTPATIENT)
Dept: HEALTH INFORMATION MANAGEMENT | Facility: OTHER | Age: 84
End: 2019-07-09

## 2019-07-21 DIAGNOSIS — D50.9 IRON DEFICIENCY ANEMIA, UNSPECIFIED IRON DEFICIENCY ANEMIA TYPE: Primary | ICD-10-CM

## 2019-07-24 NOTE — TELEPHONE ENCOUNTER
"[facility] sent Rx request for the following:      FERROUS SULFATE 325MG (5GR) TABS  Sig: TAKE 1 TABLET(325 MG) BY MOUTH TWICE DAILY WITH MEALS  Last Prescription Date:   9/19/18  Last Fill Qty/Refills:         100, R-5    Last Office Visit:              7/1/19   Future Office visit:           None.  Routing refill request to provider for review/approval because:    Iron Supplements Failed7/24 4:24 PM   Medication is active on med list     Discontinued 3/22/19; no reason noted.    Called and spoke to Patient after verifying last name and date of birth. Pt states, \"I take it twice a day. It was originally prescribed because I was cold all the time. I will be out on Saturday.\" Will route to PCP, for refill consideration. Pt aware that MAF out tomorrow and scheduled to return Friday 7/26. Unable to complete prescription refill per RN Medication Refill Policy. Ellen Monroe RN .............. 7/24/2019  4:58 PM    "

## 2019-07-25 RX ORDER — FERROUS SULFATE 325(65) MG
TABLET ORAL
Qty: 100 TABLET | Refills: 3 | Status: SHIPPED | OUTPATIENT
Start: 2019-07-25 | End: 2020-02-06

## 2019-08-20 DIAGNOSIS — M1A.9XX0 CHRONIC GOUT WITHOUT TOPHUS, UNSPECIFIED CAUSE, UNSPECIFIED SITE: ICD-10-CM

## 2019-08-20 DIAGNOSIS — I10 ESSENTIAL HYPERTENSION: ICD-10-CM

## 2019-08-26 RX ORDER — TRIAMTERENE AND HYDROCHLOROTHIAZIDE 37.5; 25 MG/1; MG/1
CAPSULE ORAL
Qty: 90 CAPSULE | Refills: 3 | Status: SHIPPED | OUTPATIENT
Start: 2019-08-26 | End: 2020-06-30

## 2019-08-26 RX ORDER — ALLOPURINOL 100 MG/1
TABLET ORAL
Qty: 90 TABLET | Refills: 3 | Status: SHIPPED | OUTPATIENT
Start: 2019-08-26 | End: 2020-06-30

## 2019-08-26 NOTE — TELEPHONE ENCOUNTER
Routing refill request to provider for review/approval because:  Labs out of range:  Creat   Has Uric Acid on file in past 12 months and value is less than 6    Normal serum creatinine on file in the past 12 months     LOV: 7/1/19  Cecy Gresham RN on 8/26/2019 at 10:26 AM

## 2019-09-12 DIAGNOSIS — I10 ESSENTIAL HYPERTENSION: ICD-10-CM

## 2019-09-13 RX ORDER — PRAZOSIN HYDROCHLORIDE 5 MG/1
CAPSULE ORAL
Qty: 270 CAPSULE | Refills: 3 | Status: SHIPPED | OUTPATIENT
Start: 2019-09-13 | End: 2020-06-30

## 2019-09-13 NOTE — TELEPHONE ENCOUNTER
"Requested Prescriptions   Pending Prescriptions Disp Refills     prazosin (MINIPRESS) 5 MG capsule [Pharmacy Med Name: PRAZOSIN HCL 5MG CAPSULES] 270 capsule 0     Sig: TAKE 2 CAPSULES BY MOUTH EVERY MORNING AND 1 CAPSULE IN THE EVENING       Alpha Blockers Passed - 9/12/2019  8:23 AM        Passed - Blood pressure under 140/90 in past 12 months     BP Readings from Last 3 Encounters:   07/01/19 136/74   05/13/19 (!) 132/98   04/15/19 122/74                 Passed - Recent (12 mo) or future (30 days) visit within the authorizing provider's specialty     Patient had office visit in the last 12 months or has a visit in the next 30 days with authorizing provider or within the authorizing provider's specialty.  See \"Patient Info\" tab in inbasket, or \"Choose Columns\" in Meds & Orders section of the refill encounter.              Passed - Patient does not have Tadalafil, Vardenafil, or Sildenafil on their medication list        Passed - Medication is active on med list        Passed - Patient is 18 years of age or older        Passed - No active pregnancy on record        Passed - No positive pregnancy test in past 12 months        lov 07/01/19  Prescription approved per [facility] Refill Protocol.    "

## 2019-09-14 DIAGNOSIS — I10 ESSENTIAL HYPERTENSION: Primary | ICD-10-CM

## 2019-09-14 DIAGNOSIS — I48.20 CHRONIC ATRIAL FIBRILLATION (H): ICD-10-CM

## 2019-09-14 DIAGNOSIS — Z87.19 H/O: GI BLEED: ICD-10-CM

## 2019-09-18 RX ORDER — DILTIAZEM HYDROCHLORIDE 240 MG/1
CAPSULE, EXTENDED RELEASE ORAL
Qty: 90 CAPSULE | Refills: 0 | Status: SHIPPED | OUTPATIENT
Start: 2019-09-18 | End: 2019-12-19

## 2019-09-18 NOTE — TELEPHONE ENCOUNTER
Routing refill request to provider for review/approval because:  Labs out of range:  Creatinine 1.4, ALT 6    Filled 9-19-18 for #90 X 3 refills. LOV 7-1-19.  Omeprazole filled per [facility] refill protocol.   Ellen Bravo RN on 9/18/2019 at 9:53 AM

## 2019-10-16 ENCOUNTER — APPOINTMENT (OUTPATIENT)
Dept: GENERAL RADIOLOGY | Facility: OTHER | Age: 84
End: 2019-10-16
Attending: PHYSICIAN ASSISTANT
Payer: MEDICARE

## 2019-10-16 ENCOUNTER — APPOINTMENT (OUTPATIENT)
Dept: PHYSICAL THERAPY | Facility: OTHER | Age: 84
End: 2019-10-16
Attending: PHYSICIAN ASSISTANT
Payer: MEDICARE

## 2019-10-16 ENCOUNTER — OFFICE VISIT (OUTPATIENT)
Dept: INTERNAL MEDICINE | Facility: OTHER | Age: 84
End: 2019-10-16
Attending: INTERNAL MEDICINE
Payer: MEDICARE

## 2019-10-16 ENCOUNTER — HOSPITAL ENCOUNTER (EMERGENCY)
Facility: OTHER | Age: 84
Discharge: HOME | End: 2019-10-16
Attending: PHYSICIAN ASSISTANT | Admitting: PHYSICIAN ASSISTANT
Payer: MEDICARE

## 2019-10-16 VITALS
OXYGEN SATURATION: 96 % | RESPIRATION RATE: 18 BRPM | HEIGHT: 61 IN | DIASTOLIC BLOOD PRESSURE: 70 MMHG | BODY MASS INDEX: 33.61 KG/M2 | SYSTOLIC BLOOD PRESSURE: 120 MMHG | TEMPERATURE: 98 F | WEIGHT: 178 LBS

## 2019-10-16 VITALS
RESPIRATION RATE: 18 BRPM | DIASTOLIC BLOOD PRESSURE: 74 MMHG | TEMPERATURE: 97.1 F | BODY MASS INDEX: 33.63 KG/M2 | WEIGHT: 178 LBS | HEART RATE: 68 BPM | SYSTOLIC BLOOD PRESSURE: 118 MMHG

## 2019-10-16 DIAGNOSIS — N18.30 CHRONIC KIDNEY DISEASE, STAGE III (MODERATE) (H): ICD-10-CM

## 2019-10-16 DIAGNOSIS — M84.364A STRESS FRACTURE OF LEFT FIBULA, INITIAL ENCOUNTER: ICD-10-CM

## 2019-10-16 DIAGNOSIS — M47.812 SPONDYLOSIS OF CERVICAL REGION WITHOUT MYELOPATHY OR RADICULOPATHY: ICD-10-CM

## 2019-10-16 DIAGNOSIS — E11.9 TYPE 2 DIABETES MELLITUS WITHOUT COMPLICATION, WITH LONG-TERM CURRENT USE OF INSULIN (H): Primary | ICD-10-CM

## 2019-10-16 DIAGNOSIS — Z79.4 TYPE 2 DIABETES MELLITUS WITHOUT COMPLICATION, WITH LONG-TERM CURRENT USE OF INSULIN (H): Primary | ICD-10-CM
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
## 2019-10-16 DIAGNOSIS — D50.9 IRON DEFICIENCY ANEMIA, UNSPECIFIED IRON DEFICIENCY ANEMIA TYPE: ICD-10-CM

## 2019-10-16 LAB
ANION GAP SERPL CALCULATED.3IONS-SCNC: 10 MMOL/L (ref 3–14)
BUN SERPL-MCNC: 27 MG/DL (ref 7–25)
CALCIUM SERPL-MCNC: 9 MG/DL (ref 8.6–10.3)
CHLORIDE SERPL-SCNC: 103 MMOL/L (ref 98–107)
CO2 SERPL-SCNC: 28 MMOL/L (ref 21–31)
CREAT SERPL-MCNC: 1.31 MG/DL (ref 0.6–1.2)
GFR SERPL CREATININE-BSD FRML MDRD: 38 ML/MIN/{1.73_M2}
GLUCOSE SERPL-MCNC: 128 MG/DL (ref 70–105)
HBA1C MFR BLD: 6.5 % (ref 4–6)
HGB BLD-MCNC: 11.5 G/DL (ref 11.7–15.7)
POTASSIUM SERPL-SCNC: 4.8 MMOL/L (ref 3.5–5.1)
SODIUM SERPL-SCNC: 141 MMOL/L (ref 134–144)

## 2019-10-16 PROCEDURE — 73610 X-RAY EXAM OF ANKLE: CPT | Mod: LT

## 2019-10-16 PROCEDURE — 36415 COLL VENOUS BLD VENIPUNCTURE: CPT | Mod: ZL | Performed by: INTERNAL MEDICINE

## 2019-10-16 PROCEDURE — 99214 OFFICE O/P EST MOD 30 MIN: CPT | Performed by: INTERNAL MEDICINE

## 2019-10-16 PROCEDURE — 97161 PT EVAL LOW COMPLEX 20 MIN: CPT | Mod: GP

## 2019-10-16 PROCEDURE — 25000132 ZZH RX MED GY IP 250 OP 250 PS 637: Mod: GY | Performed by: PHYSICIAN ASSISTANT

## 2019-10-16 PROCEDURE — 85018 HEMOGLOBIN: CPT | Mod: ZL | Performed by: INTERNAL MEDICINE

## 2019-10-16 PROCEDURE — G0463 HOSPITAL OUTPT CLINIC VISIT: HCPCS | Mod: 25

## 2019-10-16 PROCEDURE — G0008 ADMIN INFLUENZA VIRUS VAC: HCPCS

## 2019-10-16 PROCEDURE — 90686 IIV4 VACC NO PRSV 0.5 ML IM: CPT

## 2019-10-16 PROCEDURE — 73590 X-RAY EXAM OF LOWER LEG: CPT | Mod: LT

## 2019-10-16 PROCEDURE — 99283 EMERGENCY DEPT VISIT LOW MDM: CPT | Mod: Z6 | Performed by: PHYSICIAN ASSISTANT

## 2019-10-16 PROCEDURE — 99283 EMERGENCY DEPT VISIT LOW MDM: CPT | Mod: 25 | Performed by: PHYSICIAN ASSISTANT

## 2019-10-16 PROCEDURE — G0463 HOSPITAL OUTPT CLINIC VISIT: HCPCS

## 2019-10-16 PROCEDURE — 80048 BASIC METABOLIC PNL TOTAL CA: CPT | Mod: ZL | Performed by: INTERNAL MEDICINE

## 2019-10-16 PROCEDURE — 97116 GAIT TRAINING THERAPY: CPT | Mod: GP

## 2019-10-16 PROCEDURE — 99283 EMERGENCY DEPT VISIT LOW MDM: CPT | Performed by: PHYSICIAN ASSISTANT

## 2019-10-16 PROCEDURE — 83036 HEMOGLOBIN GLYCOSYLATED A1C: CPT | Mod: ZL | Performed by: INTERNAL MEDICINE

## 2019-10-16 RX ORDER — HYDROCODONE BITARTRATE AND ACETAMINOPHEN 5; 325 MG/1; MG/1
1 TABLET ORAL ONCE
Status: COMPLETED | OUTPATIENT
Start: 2019-10-16 | End: 2019-10-16

## 2019-10-16 RX ORDER — DOCUSATE SODIUM 100 MG/1
100 CAPSULE, LIQUID FILLED ORAL 2 TIMES DAILY
Qty: 10 CAPSULE | Refills: 0 | Status: SHIPPED | OUTPATIENT
Start: 2019-10-16 | End: 2020-06-30

## 2019-10-16 RX ADMIN — HYDROCODONE BITARTRATE AND ACETAMINOPHEN 1 TABLET: 5; 325 TABLET ORAL at 15:21

## 2019-10-16 ASSESSMENT — ENCOUNTER SYMPTOMS
ALLERGIC/IMMUNOLOGIC NEGATIVE: 1
CONSTITUTIONAL NEGATIVE: 1
HEMATOLOGIC/LYMPHATIC NEGATIVE: 1
ENDOCRINE NEGATIVE: 1

## 2019-10-16 ASSESSMENT — MIFFLIN-ST. JEOR: SCORE: 1164.78

## 2019-10-16 ASSESSMENT — PATIENT HEALTH QUESTIONNAIRE - PHQ9: SUM OF ALL RESPONSES TO PHQ QUESTIONS 1-9: 0

## 2019-10-16 NOTE — ED NOTES
PT contacted for consult.  CAM walker boot placed, pt tolerated well.  Denies pain while laying still.

## 2019-10-16 NOTE — PROGRESS NOTES
Chief Complaint:  F/U on DM and kidney function.    HPI: She returns for follow-up on her diabetes.  Historically, she has very good control.  We previously had her on metformin for treatment of her diabetes but then she had worsening of her renal function so I took her off of the Metformin.  Her last A1c off of any treatment was still excellent.  She is here today for recheck A1c.  Her weight is stable.    She also needs a recheck on her renal function.  As long as we are doing that, I recommended that we also check her hemoglobin.  She has a history of iron deficiency anemia that we are not otherwise investigating.  Because of her presumed GI bleeding, we also do not have her on anticoagulation for her chronic atrial fibrillation.  She just takes iron on a daily basis.    She has neck pain from osteoarthritis.  I reviewed her CT scan from May of this year confirming this.  She wonders what she can take.  I recommended deep eating rubs as well as Tylenol and that is what she is currently doing.    She would like a flu shot.  Pneumonia shot is up-to-date.    Past Medical History:   Diagnosis Date     Atrial fibrillation (H)     No anticoagulation due to GI bleed     Chronic kidney disease, stage III (moderate) (H)     No Comments Provided     Developmental disorder of scholastic skills     No Comments Provided     Essential (primary) hypertension     No Comments Provided     Gastrointestinal hemorrhage     2012,3 units pRBC     Gout     No Comments Provided     Hyperlipidemia     No Comments Provided     Obesity     No Comments Provided     Type 2 diabetes mellitus without complications (H)     No Comments Provided       Past Surgical History:   Procedure Laterality Date     APPENDECTOMY OPEN       DILATION AND CURETTAGE       EXTRACAPSULAR CATARACT EXTRATION WITH INTRAOCULAR LENS IMPLANT Bilateral      LAPAROSCOPIC TUBAL LIGATION         No Known Allergies    Current Outpatient Medications   Medication Sig Dispense  Refill     allopurinol (ZYLOPRIM) 100 MG tablet TAKE 1 TABLET(100 MG) BY MOUTH DAILY 90 tablet 3     aspirin 81 MG tablet Take 81 mg by mouth daily with food       captopril (CAPOTEN) 50 MG tablet Take 1 tablet (50 mg) by mouth 2 times daily 180 tablet 3     CARTIA  MG 24 hr capsule TAKE 1 CAPSULE(240 MG) BY MOUTH DAILY 90 capsule 0     clotrimazole (LOTRIMIN) 1 % cream Apply topically 2 times daily 60 g 11     FEROSUL 325 (65 Fe) MG tablet TAKE 1 TABLET(325 MG) BY MOUTH TWICE DAILY WITH MEALS 100 tablet 3     fluconazole (DIFLUCAN) 150 MG tablet Take 1 tab PO q week prn rash 4 tablet 0     furosemide (LASIX) 20 MG tablet Take 1 tablet by mouth daily as needed for edema       LOTRIMIN AF 2 % powder APPLY TOPICALLY TO THE AFFECTED AREA TWICE DAILY IF NEEDED 90 g 3     metoprolol succinate ER (TOPROL-XL) 200 MG 24 hr tablet TAKE 1 TABLET(200 MG) BY MOUTH DAILY 90 tablet 3     omeprazole (PRILOSEC) 20 MG DR capsule TAKE 1 CAPSULE(20 MG) BY MOUTH DAILY BEFORE A MEAL 90 capsule 3     order for DME Urinary incontinence pad / panty liner       prazosin (MINIPRESS) 5 MG capsule TAKE 2 CAPSULES BY MOUTH EVERY MORNING AND 1 CAPSULE IN THE EVENING 270 capsule 3     triamterene-HCTZ (DYAZIDE) 37.5-25 MG capsule TAKE 1 CAPSULE BY MOUTH EVERY MORNING 90 capsule 3       Social History     Socioeconomic History     Marital status:      Spouse name: Not on file     Number of children: Not on file     Years of education: Not on file     Highest education level: Not on file   Occupational History     Not on file   Social Needs     Financial resource strain: Not on file     Food insecurity:     Worry: Not on file     Inability: Not on file     Transportation needs:     Medical: Not on file     Non-medical: Not on file   Tobacco Use     Smoking status: Never Smoker     Smokeless tobacco: Never Used   Substance and Sexual Activity     Alcohol use: No     Drug use: No     Comment: Drug use: No     Sexual activity: Not  Currently   Lifestyle     Physical activity:     Days per week: Not on file     Minutes per session: Not on file     Stress: Not on file   Relationships     Social connections:     Talks on phone: Not on file     Gets together: Not on file     Attends Buddhist service: Not on file     Active member of club or organization: Not on file     Attends meetings of clubs or organizations: Not on file     Relationship status: Not on file     Intimate partner violence:     Fear of current or ex partner: Not on file     Emotionally abused: Not on file     Physically abused: Not on file     Forced sexual activity: Not on file   Other Topics Concern     Parent/sibling w/ CABG, MI or angioplasty before 65F 55M? Not Asked   Social History Narrative    Cigarettes-none.  Alcohol-none.   and lives in an apartment in town.       Review of Systems   Constitutional: Negative.    Endocrine: Negative.    Skin: Negative.    Allergic/Immunologic: Negative.    Hematological: Negative.        Physical Exam  Vitals signs and nursing note reviewed.   Constitutional:       General: She is not in acute distress.     Appearance: Normal appearance. She is not ill-appearing, toxic-appearing or diaphoretic.   Cardiovascular:      Rate and Rhythm: Rhythm irregularly irregular.      Heart sounds: No murmur.   Pulmonary:      Effort: Pulmonary effort is normal. No respiratory distress.      Breath sounds: Normal breath sounds. No stridor. No wheezing, rhonchi or rales.   Musculoskeletal:      Right lower leg: No edema.      Left lower leg: No edema.   Neurological:      Mental Status: She is alert.         Assessment:      ICD-10-CM    1. Type 2 diabetes mellitus without complication, with long-term current use of insulin (H) E11.9 Hemoglobin A1c    Z79.4    2. Chronic kidney disease, stage III (moderate) (H) N18.3 Basic Metabolic Panel   3. Iron deficiency anemia, unspecified iron deficiency anemia type D50.9 Hemoglobin   4. Spondylosis of  cervical region without myelopathy or radiculopathy M47.812        Plan: Flu shot given today.  No medication changes at this time.  Lab is pending, I will send her a letter with the results.  I think if everything looks fine and stable, we can probably wait and just recheck her again with a complete evaluation in 6 months.  Continue current symptomatic treatment for her osteoarthritis as discussed above.

## 2019-10-16 NOTE — ED PROVIDER NOTES
History     Chief Complaint   Patient presents with     Fall     HPI  Lety Luna is a 89 year old female who presents emergency department this evening for evaluation after a fall.  She was ambulatory at home when she says that her left knee gave out and she fell twisting her left ankle.  She has ecchymosis and swelling to the lateral malleolus.  And mild pain proximally.    She did not fall and hit her head there was no loss of consciousness she does not have any neck pain that is new since her stable chronic neck pain she does not have any chest pain shortness of breath there is been no palpitations.  She does not have any abdominal pain or constipation loss bowel bladder function rashes or additional trauma that were described above rates her pain 3 out of 10 worse with movement and ambulation.  The patient was seen in the clinic earlier this morning however when she went home that is when she fell was after her clinic appointment per    Allergies:  No Known Allergies    Problem List:    Patient Active Problem List    Diagnosis Date Noted     Spondylosis of cervical region without myelopathy or radiculopathy 10/16/2019     Priority: Medium     Diabetes mellitus, type II (H) 01/23/2018     Priority: Medium     Gout 01/23/2018     Priority: Medium     Hypertension 01/23/2018     Priority: Medium     Hypertriglyceridemia 01/23/2018     Priority: Medium     Learning disability 01/23/2018     Priority: Medium     Obesity 01/23/2018     Priority: Medium     Peripheral edema 09/19/2017     Priority: Medium     Chronic anemia 07/22/2015     Priority: Medium     Cardiomyopathy (H) 09/16/2014     Priority: Medium     Overview:   Probable rate related from atrial fibrillation       Candidiasis 01/21/2012     Priority: Medium     Arthropathy 10/04/2010     Priority: Medium     Atrial fibrillation (H) 02/09/2010     Priority: Medium     Chronic kidney disease, stage III (moderate) (H) 02/09/2010     Priority: Medium         Past Medical History:    Past Medical History:   Diagnosis Date     Atrial fibrillation (H)      Chronic kidney disease, stage III (moderate) (H)      Developmental disorder of scholastic skills      Essential (primary) hypertension      Gastrointestinal hemorrhage      Gout      Hyperlipidemia      Obesity      Type 2 diabetes mellitus without complications (H)        Past Surgical History:    Past Surgical History:   Procedure Laterality Date     APPENDECTOMY OPEN       DILATION AND CURETTAGE       EXTRACAPSULAR CATARACT EXTRATION WITH INTRAOCULAR LENS IMPLANT Bilateral      LAPAROSCOPIC TUBAL LIGATION         Family History:    Family History   Problem Relation Age of Onset     Hypertension Brother      Heart Disease Brother      Other - See Comments Brother         intracerebral hemorrhage/ obesity     Diabetes Sister      Diabetes Sister      Other - See Comments Sister          from pneumonia     Diabetes Father      Heart Disease Father      Diabetes Mother      Heart Disease Mother      Diabetes Son         Diabetes     Heart Disease Brother        Social History:  Marital Status:   [5]  Social History     Tobacco Use     Smoking status: Never Smoker     Smokeless tobacco: Never Used   Substance Use Topics     Alcohol use: No     Drug use: No     Comment: Drug use: No        Medications:    acetaminophen-codeine (TYLENOL #3) 300-30 MG tablet  docusate sodium (COLACE) 100 MG capsule  allopurinol (ZYLOPRIM) 100 MG tablet  aspirin 81 MG tablet  captopril (CAPOTEN) 50 MG tablet  CARTIA  MG 24 hr capsule  clotrimazole (LOTRIMIN) 1 % cream  FEROSUL 325 (65 Fe) MG tablet  fluconazole (DIFLUCAN) 150 MG tablet  furosemide (LASIX) 20 MG tablet  LOTRIMIN AF 2 % powder  metoprolol succinate ER (TOPROL-XL) 200 MG 24 hr tablet  omeprazole (PRILOSEC) 20 MG DR capsule  order for DME  prazosin (MINIPRESS) 5 MG capsule  triamterene-HCTZ (DYAZIDE) 37.5-25 MG capsule          Review of Systems     Pertinent  "positives and negatives are as above in the HPI. 10 point review of systems is otherwise negative.      Physical Exam   BP: 114/77  Heart Rate: 88  Temp: 97.4  F (36.3  C)  Resp: 16  Height: 154.9 cm (5' 1\")  Weight: 80.7 kg (178 lb)  SpO2: 95 %      Physical Exam   Exam:  Constitutional: healthy, alert and no distress  Head: Normocephalic. No masses, lesions, tenderness or abnormalities  Neck: Neck supple. No adenopathy. Thyroid symmetric, normal size,, Carotids without bruits.  ENT: ENT exam normal, no neck nodes or sinus tenderness  Cardiovascular: negative, PMI normal. No lifts, heaves, or thrills. RRR. No murmurs, clicks gallops or rub  Respiratory: negative, Percussion normal. Good diaphragmatic excursion. Lungs clear  Gastrointestinal: Abdomen soft, non-tender. BS normal. No masses, organomegaly  : Deferred  Musculoskeletal: Left lower extremity abduction abduction and axial loading of the left hip is intact with only discomfort valgus and varus stress anterior posterior drawer sign of the knee is intact Denise's examination no palpable click.  Compression test of the tib-fib syndesmosis some mild tenderness proximally.  Inversion and eversion and anterior and posterior drawer sign of the ankle tenderness laterally.  Ecchymosis and swelling to lateral malleolus pulses are present distally CMS is otherwise intact Homans sign and Contreras's test not evaluated.  The right lower extremity is otherwise unremarkable.  Skin: no suspicious lesions or rashes  Neurologic: Gait normal. Reflexes normal and symmetric. Sensation grossly WNL.  Psychiatric: mentation appears normal and affect normal/bright  Hematologic/Lymphatic/Immunologic: Normal cervical lymph nodes      ED Course          Procedures    Results for orders placed or performed during the hospital encounter of 10/16/19 (from the past 24 hour(s))   XR Tibia & Fibula Port Left 2 Views    Narrative    XR TIBIA & FIBULA PORT LT 2 VW    HISTORY: 89 years " Female fall    COMPARISON: None    TECHNIQUE: 3 views left tibia and fibula    FINDINGS: There is osteopenia. There is an old appearing healed  fracture deformity of the distal fibula. There is a linear lucency in  the proximal fibula seen on the AP view only.      Impression    IMPRESSION: Questionable nondisplaced, nonangulated proximal fibular  fracture seen only on one of the 3 views.    CAROLYN FOLEY MD   XR Ankle Port Left G/E 3 Views    Narrative    XR ANKLE PORT LT G/E 3 VW    HISTORY: 89 years Female fall    COMPARISON: None    TECHNIQUE: 3 views left ankle    FINDINGS: The ankle mortise is congruent. There is lateral soft tissue  edema. There is cortical buckling of the distal fibula. There is a  very subtle lucent fracture line of the distal fibula.      Impression    IMPRESSION: Nondisplaced distal fibular fracture with lateral soft  tissue edema. Suspect presence of an older healed oblique fracture  deformity of the distal fibula.    CAROLYN FOLEY MD     Assessments & Plan (with Medical Decision Making)     I have reviewed the nursing notes.    I have reviewed the findings, diagnosis, plan and need for follow up with the patient.  Differential diagnosis considerations included strain, sprain, fracture, contusion, dislocation, internal ligament derangement, disk herniation-radiculopathy, arthritis, bursitis, tendonitis, DVT, vascular occlusion, claudication, compartment syndrome, cellulitis.  The patient presents respond to see him for evaluation of a nondisplaced distal fibula fracture with lateral soft tissue edema and a questionable proximal fibular fracture as well.  Because of this finding and that this is an isolated fracture the patient may be treated in a short-leg walking cast or removable cast boot, in neutral position, for four to six weeks, weight-bearing as tolerated. The risk of further harming the patient with placing a plaster splint and not weightbearing would be the risk of  falling.  Patient will be placed in a cam walker.  Patient tolerated the CAM Walker well she had mild discomfort physical therapy did evaluate her at the bedside felt as though the patient could be safe to be discharged home she is able to use the walker we are doing the best we can to provide her access to a wheelchair and is provided her with resources.  The patient will stay with family and follow-up accordingly with orthopedics tomorrow late morning.  She will receive analgesia here in the emergency department as noted if symptoms worsen if there is further concerns at any time she will she return the emergency department.   I explained my diagnostic considerations and recommendations and the patient voiced an understanding and was in agreement with the treatment plan. All questions were answered. We discussed potential side effects of any prescribed or recommended therapies, as well as expectations for response to treatments.      New Prescriptions    ACETAMINOPHEN-CODEINE (TYLENOL #3) 300-30 MG TABLET    Take 1-2 tablets by mouth every 6 hours as needed for severe pain    DOCUSATE SODIUM (COLACE) 100 MG CAPSULE    Take 1 capsule (100 mg) by mouth 2 times daily       Final diagnoses:   Stress fracture of left fibula, initial encounter   Fall, initial encounter       10/16/2019   [facility]     Kyle Cox PA-C  10/16/19 1410

## 2019-10-16 NOTE — NURSING NOTE
"The patient is here today to be seen for a diabetic check up.  Jessica Correa LPN on 10/16/2019 at 8:53 AM  Chief Complaint   Patient presents with     Diabetes       Initial There were no vitals taken for this visit. Estimated body mass index is 34.16 kg/m  as calculated from the following:    Height as of 9/21/18: 1.549 m (5' 1\").    Weight as of 7/1/19: 82 kg (180 lb 12.8 oz).  Medication Reconciliation: complete    Jessica Correa LPN    "

## 2019-10-16 NOTE — LETTER
October 16, 2019      Lety Luna  [address]  [place] [address]        Dear Lety,    Below are the results of your recent labs:    Results for orders placed or performed in visit on 10/16/19   Hemoglobin   Result Value Ref Range    Hemoglobin 11.5 (L) 11.7 - 15.7 g/dL   Hemoglobin A1c   Result Value Ref Range    Hemoglobin A1C 6.5 (H) 4.0 - 6.0 %   Basic Metabolic Panel   Result Value Ref Range    Sodium 141 134 - 144 mmol/L    Potassium 4.8 3.5 - 5.1 mmol/L    Chloride 103 98 - 107 mmol/L    Carbon Dioxide 28 21 - 31 mmol/L    Anion Gap 10 3 - 14 mmol/L    Glucose 128 (H) 70 - 105 mg/dL    Urea Nitrogen 27 (H) 7 - 25 mg/dL    Creatinine 1.31 (H) 0.60 - 1.20 mg/dL    GFR Estimate 38 (L) >60 mL/min/[1.73_m2]    GFR Estimate If Black 46 (L) >60 mL/min/[1.73_m2]    Calcium 9.0 8.6 - 10.3 mg/dL        Your blood tests look fine.  You are just a little bit anemic so you need to continue taking your iron.  Your diabetes test looks fine.  Your kidney tests actually are improved.  Congratulations on this report.  If all goes well, I would recommend a physical in 6 months.    Sincerely,        Rodney Nelson MD  Internal Medicine  [facility]

## 2019-10-16 NOTE — DISCHARGE INSTRUCTIONS
FOLLOW UP IN 8-12 HOURS FOR A RECHECK WITH THE ORTHO CLINIC   PLEASE RETURN SOONER IF YOU HAVE FURTHER CONCERNS  TAKE ALL PREVIOUS AND ANY NEW MEDS AS PRESCRIBED       THE DISCHARGE INSTRUCTIONS ARE INTENDED AS A COMPLEMENT TO AND NOT A REPLACEMENT FOR THE VERBAL INSTRUCTIONS THAT I HAVE PROVIDED YOU TONIGHT. AFTER GOING OVER THE PLAN OF CARE TONIGHT, INCLUDING SIDE EFFECTS, ADVERSE REACTIONS OF ALL MEDICATIONS PRESCRIBED (THIS WILL BE PROVIDED TO YOU AT THE PHARMACY ALSO) AND PROVIDING YOU WITH THE VERBAL INSTRUCTIONS AT DISCHARGE YOU HAVE HAD THE OPPORTUNITY TO ASK FURTHER QUESTIONS AND TO CLARIFY UNCERTAINTIES. SINCE YOU HAVE NO FURTHER QUESTIONS PLEASE HAVE A WONDERFUL SAFE EVENING THANK YOU.     Blood Pressure   ________________________________________________________________________  KEY POINTS  Blood pressure is the force of blood against artery walls as the heart pumps blood through the body.  Most people with high blood pressure have no symptoms.  If your blood pressure is too high, your healthcare provider may advise that you lose excess weight, use less salt in your food, be physically active, or take medicine.  If you have low blood pressure that is causing symptoms, do not skip meals, drink plenty of liquids, and stand up slowly after laying down.  ________________________________________________________________________  What is blood pressure?  Blood pressure is the force of blood against artery walls as the heart pumps blood through the body. Many people can improve their blood pressure or prevent high blood pressure with diet changes, more activity, and weight loose if needed. Even if you have a strong family history of high blood pressure, you can improve your blood pressure with a healthy lifestyle.  Blood pressure can go up briefly with exercise, stress, pain, or strong emotions. Drinking alcohol or using some illegal drugs, like cocaine, can also raise blood pressure.  Blood pressure normally  "goes down when you are resting, sleeping, or feeling calm and relaxed. It can also go down if you are dehydrated and are not drinking enough liquids, such as if you are working or exercising in the hot sun.  How is blood pressure measured?  Most people with high blood pressure have no symptoms. The only way to find out if your blood pressure is normal, too high, or too low is to have it measured. Your healthcare provider measures blood pressure using an inflatable cuff around your upper arm and either a stethoscope or a machine that shows the result.  Low blood pressure usually means blood pressure that is lower than 90/60 or is low enough to cause symptoms. The first, upper number (90 in this example) is the pressure when the heart beats and pushes blood out to the rest of the body. The second, lower number (60 in this example) is the pressure when the heart rests between beats. Low blood pressure is less common than high blood pressure.  Normal resting blood pressure ranges up to 120/80 ( 120 over 80\").  Borderline high blood pressure is 120/80 or higher but less than 140/90.  High blood pressure is 140/90 or higher for most people. If you have chronic kidney disease, 130/80 or higher is considered high blood pressure.  Why is high blood pressure a problem?  Over time, if your blood pressure rises and stays high, it can damage your blood vessels, heart, brain, kidneys, and eyes even though you may have no symptoms. The higher your blood pressure is, the more it increases your risk of heart attack, stroke, and other serious medical problems.  If you have high blood pressure, lowering it and keeping it normal can help prevent a heart attack or stroke. Keeping your blood pressure under control can help prevent long-term health problems as well, such as heart failure, kidney failure, and blindness.  How can I keep my blood pressure at the right level?  If your blood pressure is too high, your provider may recommend " lifestyle changes to help you lower your blood pressure, such as:  Lose excess weight. If you are overweight, losing even 10 pounds can lower your blood pressure.  Use less salt (sodium) in your food. Check the levels of sodium listed on food labels. Most of the sodium you eat may be hidden in processed foods such as chips, crackers, canned or boxed foods, fast food, or restaurant food.  Follow a healthy eating plan that is low in saturated fat, cholesterol, and processed foods. Include lots of fruits, vegetables, and fat-free or low-fat milk and milk products. Eat only enough calories to reach or keep a healthy weight. Ask your healthcare provider about how many calories you should eat each day.  Be physically active. Your provider can give you a physical activity plan that tells you what kind of activity and how much is safe for you.  Find ways to relax and to manage stress.  If you smoke, try to quit. Talk to your healthcare provider about ways to quit smoking. Smoking with high blood pressure raises the risk of heart attack and stroke.  If you want to drink alcohol, ask your healthcare provider how much is safe for you to drink.  Be careful with nonprescription medicines or herbal supplements. Some can raise blood pressure. This includes diet pills, cold and pain medicines, and energy drinks. Read labels or ask your pharmacist if the medicine or supplement affects blood pressure.  If lifestyle changes don t lower your blood pressure enough, your healthcare provider may prescribe one or more types of blood pressure medicine. Always follow your healthcare provider's instructions for taking medicine. Don't take more or less medicine or stop taking a medicine without talking to your provider first. It can be dangerous to stop taking certain blood pressure medicines suddenly.  If you have low blood pressure that is causing symptoms, after your healthcare provider has seen you, try these tips:  Don t skip meals. Eat a  healthy diet.  Avoid being out in the heat for a long time or being too active without drinking enough liquids.  Drink plenty of liquids every day, especially in hot weather or when outside.  If you have been lying down, sit for a moment before standing up, and then stand up slowly. Stand a moment before walking. Walk in place briefly while pulling in your stomach muscles several times. (This helps the return of blood flow from the legs.)  If your blood pressure is normal, check it at least once a year. Your provider may recommend checking your blood pressure at home between checkups.  Developed by [employer].  Adult Advisor 2016.2 published by [employer].  Last modified: 2016-04-21  Last reviewed: 2016-04-15  This content is reviewed periodically and is subject to change as new health information becomes available. The information is intended to inform and educate and is not a replacement for medical evaluation, advice, diagnosis or treatment by a healthcare professional.  References   Adult Advisor 2016.2 Index    Copyright   2016 [employer], a division of [employer] All rights reserved.    FOLLOW UP WITH THE ORTHO CLINIC IN 12-24 HOURS FOR A RECHECK  ICE ELEVATE AND REST  TAKE MEDS AS PRESCRIBED   IF SYMPTOMS WORSEN OR IF YOU HAVE FURTHER CONCERNS COME BACK TO THE ER   TYLENOL EVERY 4-6 HOURS AND MOTRIN EVERY 6-8 HOURS FOR PAIN     ANY TIME XRAYS ARE DONE THERE MAY BE A FRACTURE THAT IS NOT VISIBLE ON THE INITIAL XRAY. THE RADIOLOGIST WILL REVIEW THE IMAGE AND IF THERE IS A CONCERN YOU WILL BE NOTIFIED.     THE DISCHARGE INSTRUCTIONS ARE INTENDED AS A COMPLEMENT TO AND NOT A REPLACEMENT FOR THE VERBAL INSTRUCTIONS THAT I HAVE PROVIDED YOU TONIGHT. AFTER GOING OVER THE PLAN OF CARE TONIGHT, INCLUDING SIDE EFFECTS, ADVERSE REACTIONS OF ALL MEDICATIONS PRESCRIBED (THIS WILL BE PROVIDED TO YOU AT THE PHARMACY ALSO) AND PROVIDING YOU WITH THE VERBAL INSTRUCTIONS AT DISCHARGE YOU HAVE HAD THE  OPPORTUNITY TO ASK FURTHER QUESTIONS AND TO CLARIFY UNCERTAINTIES. SINCE YOU HAVE NO FURTHER QUESTIONS PLEASE HAVE A WONDERFUL SAFE EVENING THANK YOU.     YOUR SPLINT HAS BEEN APPLIED AND EVALUATED FOR TIGHTNESS, TO MAKE SURE YOU HAVE SENSATION AND CIRCULATION. THIS WAS DONE BEFORE AND AFTER YOUR SPLINT WAS APPLIED AND THERE WERE NO CONCERNS. WHILE AT HOME IF YOU FEEL YOUR SPLINT IS TOO TIGHT THIS CAN CAUSE SIGNIFICANT PROBLEMS THAT WILL NEED TO BE RECHECK IMMEDIATELY. PLEASE RETURN TO THE ER IF YOU HAVE ANY CONCERNS OR IF YOU FEEL NUMBNESS, TINGLING, SWELLING OR YOU THINK THE SPLINT IS TOO TIGHT.     PLEASE KEEP YOUR SPLINT DRY IF IT GETS WET YOU MUST IMMEDIATELY RETURN TO THE ER.    IF YOU RECEIVED A SPLINT FOR YOUR LEG TONIGHT PLEASE DO NOT WALK ON YOUR SPLINT.     IF YOU DEVELOP A BLISTER ON ANY AREA OF YOUR SKIN IT IS VERY IMPORTANT TO COME BACK TO THE ER IMMEDIATELY FOR EVALUATION. THIS IS A RARE CONDITION AND NEEDS TO BE ADDRESSED IMMEDIATELY BEFORE COMPLICATIONS ARISE.    When should I contact my caregiver?   Contact your caregiver if:   Your hard splint gets wet or is damaged.   You have a fever.   Your splint feels tighter.   You have itchy, dry skin under your splint that is getting worse.   The skin under your splint is red, or you have a new sore.   There is a bad smell coming from your splint.   When should I seek immediate care?   Seek care immediately or call 911 if:   You have increased pain.   Your fingers or toes are numb or tingling.   You feel burning or stinging around your injury.   Your nails, fingers, or toes turn pale, blue, or gray, and feel cold.   You have new or increased trouble moving your fingers or toes.   Your swelling gets worse. You may see more swelling in your fingers or toes below your splint.   The skin under your splint is bleeding or leaking pus.   What are the risks of splinting?   Your splint may feel tight until the swelling from your injury decreases. A tight splint may  be painful, and it can limit blood flow to your injury. Pressure from the splint may cause your injured area to feel numb or tingly. The skin under your splint may become dry, itchy, or infected. You may get painful sores on the skin under the splint. Your splinted joint may feel stiff. You may develop contractures that change the way your injured area works and looks. Your risk for these problems increases the longer you have to wear the splint.    Sometimes a hard splint can burn your skin from the heat the splint creates as it dries. You may develop compartment syndrome, which is swelling that becomes so severe that it blocks blood flow to your injury. Compartment syndrome can cause severe pain and long-term damage to your muscles and nerves. Without splint treatment for injuries, such as a fracture or sprain, your bones and tissues may not heal properly.  Some people have mild pain and swelling if they don't rest the injured area enough. To avoid this, it is important to do the following:   Keep the injured area above the level of your heart (for instance, prop it up with pillows).   Wiggle your fingers or toes while resting.   Apply ice, if needed. It can be used for 15 to 30 minutes over a cast or splint as long as the ice doesn't get the splint or cast wet or touch the skin for too long.   Talk to your doctor before taking pain medicine  Take good care of your cast or splint to help your injury heal properly. Also remember:   Never stick objects inside a cast or splint. They can get stuck, break off, or hurt your skin.   Don't get dirt or sand inside a cast or splint.   Don't apply powders or deodorants inside a cast. For severe itching, call your doctor.   Never break off pieces of your cast or splint or try to adjust it yourself. If it needs to be adjusted, call your doctor.   Check the cast and the exposed skin daily. If you notice damage to the cast or any injury, call your doctor.    Discharge  Instructions  Extremity Injury    You were seen today for an injury to an extremity (arm, hand, leg, or foot). You may have a bruise, strain, or fracture (broken bone).    Generally, every Emergency Department visit should have a follow-up clinic visit with either a primary or a specialty clinic/provider. Please follow-up as instructed by your emergency provider today.  Return to the Emergency Department right away if:  Your pain seems to change or get worse or there is pain in a new area that wasn t evaluated today.  Your extremity becomes pale, cool, blue, or numb or tingling past the injury.  You have more drainage, redness or pain in the area of the cut or abrasion.  You have pain that you cannot control with the medicine recommended or prescribed here, or you have pain that seems too much for your injury.  Your child (who is injured) will not stop crying or is much more fussy than normal.  You have new symptoms or anything that worries you.    What to Expect:  Your swelling and pain may be worse the day after your injury, but should not be severe and should start getting better after that. You should not have new symptoms and your pain should not get worse.  You may start to get a bruise over the injured area or below the injured area (bruising can follow gravity).  Your movement and strength should get better with time.  Some injuries may not show up until after you have left the Emergency Department so it is important to follow-up as directed.  Your injury may prevent you from working.  Follow-up with your regular provider to get a work release note.  Pain medications or your injury may make it unsafe to drive or operate machinery.    Home Care:  RICE: Rest, Ice, Compression, Elevation  Rest: Rest your injured area for at least 1-2 days. After that you may start using your extremity again as long as there is not too much pain.   Ice: Apply ice your injured area for 15 minutes at a time, at least 3 times a  day. Use a cloth between the ice bag and your skin to prevent frostbite. Do not sleep with an ice pack or heating pad on, since this can cause burns or skin injury.  Compression: You may use an elastic bandage (Ace  Wrap) if it makes you more comfortable. Wrap it just tight enough to provide light compression, like a new pair of socks feels. Loosen the bandage if you have swelling past the bandage.  Elevation: Raise the injured area above the level of your heart as much as possible in the first 1-2 days.    Use Tylenol  (acetaminophen), Motrin (ibuprofen), or Advil  (ibuprofen) for your pain unless you have an allergy or are told not to use these medications by your provider.  Take the medications as instructed on the package. Tylenol  (acetaminophen) is in many prescription medicines and non-prescription medicines--check all of your medicines to be sure you aren t taking more than 3000 mg per day.  Please follow any other instructions that were discussed with you by your provider.    Stretching/Exercises:  You may have been provided with instructions for stretching or exercises. If your injury was to your arm or shoulder and your provider put you in a sling or an immobilizer, it is important that you take off your immobilizer within 3 days and stretch/move your shoulder, unless your provider specifically tells you to not move your shoulder.  This is to prevent further injury such as a  frozen shoulder .     If you were given a prescription for medicine here today, be sure to read all of the information (including the package insert) that comes with your prescription.  This will include important information about the medicine, its side effects, and any warnings that you need to know about.  The pharmacist who fills the prescription can provide more information and answer questions you may have about the medicine.  If you have questions or concerns that the pharmacist cannot address, please call or return to the  Emergency Department.     Remember that you can always come back to the Emergency Department if you are not able to see your regular provider in the amount of time listed above, if you get any new symptoms, or if there is anything that worries you.

## 2019-10-16 NOTE — ED AVS SNAPSHOT
[facility]  [address]  [place] [address]  Phone:  [phone]  Fax:  [phone]                                    Lety Luna   MRN: [id number]    Department:  [facility]   Date of Visit:  10/16/2019           After Visit Summary Signature Page    I have received my discharge instructions, and my questions have been answered. I have discussed any challenges I see with this plan with the nurse or doctor.    ..........................................................................................................................................  Patient/Patient Representative Signature      ..........................................................................................................................................  Patient Representative Print Name and Relationship to Patient    ..................................................               ................................................  Date                                   Time    ..........................................................................................................................................  Reviewed by Signature/Title    ...................................................              ..............................................  Date                                               Time          22EPIC Rev 08/18

## 2019-10-16 NOTE — ED TRIAGE NOTES
Pt arrives to the ED via private car with son and daughter-in-law.  Pt states that she fell approx. 1130 today after her leg gave out on her.  Pt denies hitting her head, denies LOC or being on blood thinners, only complaint is her left knee and ankle.  Left ankle does appear to be disfigured.

## 2019-10-17 ENCOUNTER — OFFICE VISIT (OUTPATIENT)
Dept: ORTHOPEDICS | Facility: OTHER | Age: 84
End: 2019-10-17
Attending: PODIATRIST
Payer: MEDICARE

## 2019-10-17 DIAGNOSIS — Z00.00 ROUTINE GENERAL MEDICAL EXAMINATION AT A HEALTH CARE FACILITY: Primary | ICD-10-CM

## 2019-10-17 PROCEDURE — G0463 HOSPITAL OUTPT CLINIC VISIT: HCPCS

## 2019-10-17 NOTE — PROGRESS NOTES
10/16/19 1400   Quick Adds   Type of Visit Initial PT Evaluation   Living Environment   Lives With alone   Living Arrangements apartment   Home Accessibility no concerns   Transportation Anticipated family or friend will provide   Self-Care   Usual Activity Tolerance moderate   Current Activity Tolerance fair   Equipment Currently Used at Home walker, rolling   Functional Level Prior   Ambulation 1-->assistive equipment   Transferring 1-->assistive equipment   Toileting 0-->independent   Communication 0-->understands/communicates without difficulty   Cognition 0 - no cognition issues reported   Fall history within last six months yes   Number of times patient has fallen within last six months 2   General Information   Weight-Bearing Status - LLE weight-bearing as tolerated   Weight-Bearing Status - RLE full weight-bearing   Cognitive Status Examination   Orientation orientation to person, place and time   Level of Consciousness alert   Follows Commands and Answers Questions 100% of the time   Personal Safety and Judgment intact   Memory intact   Pain Assessment   Patient Currently in Pain Yes, see Vital Sign flowsheet   Integumentary/Edema   Integumentary/Edema Comments appears intact with minimal LE edema notaed   Posture    Posture Forward head position;Protracted shoulders   Range of Motion (ROM)   ROM Comment WFL LEs with exception of left foot/ankle in boot   Strength   Strength Comments WFL LEs   Bed Mobility   Bed Mobility Comments CGA for safety   Transfer Skills   Transfer Comments CGA with Fww   Gait   Gait Comments CGA with Fww   Balance   Balance Comments fair with use of Fww   Sensory Examination   Sensory Perception Comments appears intact to light touch in LEs   Coordination   Coordination no deficits were identified   Clinical Impression   Criteria for Skilled Therapeutic Intervention evaluation only   PT Diagnosis impaired mobility   Influenced by the following impairments pain   Functional  limitations due to impairments activity/gait tolerance   Clinical Presentation Stable/Uncomplicated   Clinical Decision Making (Complexity) Low complexity   Predicted Duration of Therapy Intervention (days/wks) 1 days   Anticipated Equipment Needs at Discharge wheelchair;front wheeled walker   Anticipated Discharge Disposition Home with Assist   Risk & Benefits of therapy have been explained Yes   Patient, Family & other staff in agreement with plan of care Yes   Total Evaluation Time   Total Evaluation Time (Minutes) 15

## 2019-10-21 NOTE — PROGRESS NOTES
VITALS:   Height:   61  Weight:   178      CHIEF COMPLAINT: Left Distal Fibula Fracture    PROBLEMS:   Patient has no noted problems.    PATIENT REPORTED MEDICATIONS:  TRIAMTERENE-HCTZ 37.5-25 MG ORAL CAPSULE (TRIAMTERENE-HCTZ) ; Route: ORAL  PRAZOSIN HCL 5 MG ORAL CAPSULE (PRAZOSIN HCL) ; Route: ORAL  OMEPRAZOLE 20 MG ORAL CAPSULE DELAYED RELEASE (OMEPRAZOLE) ; Route: ORAL  MICONAZOLE NITRATE 2 % EXTERNAL CREAM (MICONAZOLE NITRATE) ; Route: EXTERNAL  METOPROLOL SUCCINATE  MG ORAL TABLET EXTENDED RELEASE 24 HOUR (METOPROLOL SUCCINATE) ; Route: ORAL  FUROSEMIDE 20 MG ORAL TABLET (FUROSEMIDE) ; Route: ORAL  FLUCONAZOLE 150 MG ORAL TABLET (FLUCONAZOLE) ; Route: ORAL  FERROUS SULFATE 325 (65 FE) MG ORAL TABLET (FERROUS SULFATE) ; Route: ORAL  DOCUSATE SODIUM 100 MG ORAL CAPSULE (DOCUSATE SODIUM) ; Route: ORAL  DILTIAZEM HCL ER COATED BEADS 240 MG ORAL CAPSULE EXTENDED RELEASE 24 HOUR (DILTIAZEM HCL COATED BEADS) ; Route: ORAL  CLOTRIMAZOLE 1 % EXTERNAL CREAM (CLOTRIMAZOLE) ; Route: EXTERNAL  CAPTOPRIL 50 MG ORAL TABLET (CAPTOPRIL) ; Route: ORAL  ASPIRIN 81 MG ORAL TABLET DELAYED RELEASE (ASPIRIN) ; Route: ORAL  ALLOPURINOL 100 MG ORAL TABLET (ALLOPURINOL) ; Route: ORAL  ACETAMINOPHEN-CODEINE #3 300-30 MG ORAL TABLET (ACETAMINOPHEN-CODEINE) ; Route: ORAL    PATIENT REPORTED ALLERGIES:   Patient has no noted allergies.    RISK FACTORS:  Tobacco use:   never smoker  Alcohol Use:   No    HISTORY OF PRESENT ILLNESS:    The patient is a 89-year-old female who fell yesterday (her knee gave out) and fractured her distal fibula.  It is a nondisplaced injury.  She is here to have this evaluated and to discuss options.    The patient's health history form dated 10/17/2019 was reviewed and signed.  Past medical history, surgical history, social history, family history, and review of systems noted.    PAST MEDICAL HISTORY:    Diabetes  High Blood Pressure  Heart Trouble  Arthritis  Gout    PAST ORTHOPEDIC SURGICAL HISTORY:     No Past Orthopedic Surgical History    PAST SURGICAL HISTORY:    Appendectomy  Cataract  Tubal Ligation    FAMILY HISTORY:    Family History Unknown    SOCIAL HISTORY:   Occupation:  Retired  Marital Status:   Alcohol Use:  No  Tobacco Use:  Never    REVIEW OF SYSTEMS:  Swelling:  Yes    PHYSICAL EXAMINATION:    CONSTITUTIONAL:  Patient is alert and oriented x3, well-appearing and in no apparent distress.  Affect is pleasant and answers questions appropriately.  VASCULAR:  Circulation is intact with palpable pedal pulses and has adequate capillary fill time.  NEUROLOGIC:  Light touch sensation is intact to digits.  INTEGUMENT:  No dermatologic lesions are noted.  Skin with normal texture and turgor.  MUSCULOSKELETAL:   Limited due to guarding.  She does have significant swelling and bruising to the lateral fibula.  Tender to palpation in the area.  No obvious instability.  She has been doing some weightbearing in her boot.    X-RAY:  I did review x-rays.  A nondisplaced left distal fibular fracture is appreciated.    ASSESSMENT:    Left distal fibular fracture, nondisplaced.    PLAN:   Discussed condition and treatment with the patient today.  She will progress weightbearing in her boot only.  I gave her a compression sleeve and an ACE wrap to help with some of the swelling.  Otherwise stay in the boot.  Weightbearing as tolerated with the walker.  She is a 89-year-old lady who already has some balance issues and an unstable knee, so she should have a walker at all times.  Will see her back in a month, repeat x-rays and progress as able.    Dictated by Audi Ch DPM  cc:  Dr. Ch at [facility]    D:  10/17/2019  T:  10/21/2019    Typed and/or reviewed and corrected by signing  below, and sent to the Physician for final review and signature.    This report was created using voice recording software and computer-generated templates. Although every effort has been made to review  for and eliminate errors, some errors may still occur.

## 2019-11-05 ENCOUNTER — OFFICE VISIT (OUTPATIENT)
Dept: INTERNAL MEDICINE | Facility: OTHER | Age: 84
End: 2019-11-05
Attending: INTERNAL MEDICINE
Payer: MEDICARE

## 2019-11-05 ENCOUNTER — HOSPITAL ENCOUNTER (OUTPATIENT)
Dept: GENERAL RADIOLOGY | Facility: OTHER | Age: 84
Discharge: HOME | End: 2019-11-05
Attending: INTERNAL MEDICINE | Admitting: INTERNAL MEDICINE
Payer: MEDICARE

## 2019-11-05 ENCOUNTER — HOSPITAL ENCOUNTER (OUTPATIENT)
Dept: GENERAL RADIOLOGY | Facility: OTHER | Age: 84
End: 2019-11-05
Attending: INTERNAL MEDICINE
Payer: MEDICARE

## 2019-11-05 VITALS
WEIGHT: 175 LBS | HEART RATE: 84 BPM | SYSTOLIC BLOOD PRESSURE: 108 MMHG | BODY MASS INDEX: 33.07 KG/M2 | TEMPERATURE: 97.4 F | RESPIRATION RATE: 20 BRPM | DIASTOLIC BLOOD PRESSURE: 76 MMHG

## 2019-11-05 DIAGNOSIS — S99.912D INJURY OF LEFT ANKLE, SUBSEQUENT ENCOUNTER: ICD-10-CM

## 2019-11-05 DIAGNOSIS — S99.912D INJURY OF LEFT ANKLE, SUBSEQUENT ENCOUNTER: Primary | ICD-10-CM

## 2019-11-05 PROCEDURE — G0463 HOSPITAL OUTPT CLINIC VISIT: HCPCS

## 2019-11-05 PROCEDURE — 73610 X-RAY EXAM OF ANKLE: CPT | Mod: LT

## 2019-11-05 PROCEDURE — 99213 OFFICE O/P EST LOW 20 MIN: CPT | Performed by: INTERNAL MEDICINE

## 2019-11-05 PROCEDURE — G0463 HOSPITAL OUTPT CLINIC VISIT: HCPCS | Mod: 25

## 2019-11-05 PROCEDURE — 73590 X-RAY EXAM OF LOWER LEG: CPT | Mod: LT

## 2019-11-05 ASSESSMENT — PATIENT HEALTH QUESTIONNAIRE - PHQ9: SUM OF ALL RESPONSES TO PHQ QUESTIONS 1-9: 0

## 2019-11-05 ASSESSMENT — ENCOUNTER SYMPTOMS
ENDOCRINE NEGATIVE: 1
ALLERGIC/IMMUNOLOGIC NEGATIVE: 1
EYES NEGATIVE: 1
CONSTITUTIONAL NEGATIVE: 1

## 2019-11-05 NOTE — NURSING NOTE
"The patient is here today to have a follow up on her left foot.  Jessica Correa LPN on 11/5/2019 at 10:32 AM  Chief Complaint   Patient presents with     RECHECK       Initial There were no vitals taken for this visit. Estimated body mass index is 33.63 kg/m  as calculated from the following:    Height as of 10/16/19: 1.549 m (5' 1\").    Weight as of 10/16/19: 80.7 kg (178 lb).  Medication Reconciliation: complete    Jessica Correa LPN    "

## 2019-11-12 DIAGNOSIS — S82.892D CLOSED FRACTURE OF LEFT ANKLE WITH ROUTINE HEALING, SUBSEQUENT ENCOUNTER: Primary | ICD-10-CM

## 2019-11-26 ENCOUNTER — HOSPITAL ENCOUNTER (OUTPATIENT)
Dept: GENERAL RADIOLOGY | Facility: OTHER | Age: 84
End: 2019-11-26
Attending: INTERNAL MEDICINE
Payer: MEDICARE

## 2019-11-26 ENCOUNTER — HOSPITAL ENCOUNTER (OUTPATIENT)
Dept: GENERAL RADIOLOGY | Facility: OTHER | Age: 84
Discharge: HOME | End: 2019-11-26
Attending: INTERNAL MEDICINE | Admitting: INTERNAL MEDICINE
Payer: MEDICARE

## 2019-11-26 ENCOUNTER — OFFICE VISIT (OUTPATIENT)
Dept: INTERNAL MEDICINE | Facility: OTHER | Age: 84
End: 2019-11-26
Attending: INTERNAL MEDICINE
Payer: MEDICARE

## 2019-11-26 VITALS
HEART RATE: 68 BPM | DIASTOLIC BLOOD PRESSURE: 84 MMHG | RESPIRATION RATE: 20 BRPM | SYSTOLIC BLOOD PRESSURE: 120 MMHG | WEIGHT: 173 LBS | BODY MASS INDEX: 32.69 KG/M2

## 2019-11-26 DIAGNOSIS — S99.912D INJURY OF LEFT ANKLE, SUBSEQUENT ENCOUNTER: Primary | ICD-10-CM

## 2019-11-26 DIAGNOSIS — S99.912D INJURY OF LEFT ANKLE, SUBSEQUENT ENCOUNTER: ICD-10-CM

## 2019-11-26 PROCEDURE — 73610 X-RAY EXAM OF ANKLE: CPT | Mod: LT

## 2019-11-26 PROCEDURE — 99213 OFFICE O/P EST LOW 20 MIN: CPT | Performed by: INTERNAL MEDICINE

## 2019-11-26 PROCEDURE — G0463 HOSPITAL OUTPT CLINIC VISIT: HCPCS | Mod: 25

## 2019-11-26 PROCEDURE — 73590 X-RAY EXAM OF LOWER LEG: CPT | Mod: LT

## 2019-11-26 PROCEDURE — G0463 HOSPITAL OUTPT CLINIC VISIT: HCPCS

## 2019-11-26 ASSESSMENT — ENCOUNTER SYMPTOMS
CONSTITUTIONAL NEGATIVE: 1
ENDOCRINE NEGATIVE: 1
ALLERGIC/IMMUNOLOGIC NEGATIVE: 1
EYES NEGATIVE: 1

## 2019-11-26 ASSESSMENT — PATIENT HEALTH QUESTIONNAIRE - PHQ9: SUM OF ALL RESPONSES TO PHQ QUESTIONS 1-9: 0

## 2019-11-26 NOTE — PROGRESS NOTES
Chief Complaint: Follow-up fractures.    HPI: She is here today for follow-up on her fibular fractures.  She appeared to have a proximal as well as a distal fibula fracture.  It has now been 6 weeks.  She is wearing a cam boot walker and everything seems to be healing up well.  The proximal fracture never really seem to bother her so we never really did anything for that.  It still does not bother her.    Past Medical History:   Diagnosis Date     Atrial fibrillation (H)     No anticoagulation due to GI bleed     Chronic kidney disease, stage III (moderate) (H)     No Comments Provided     Developmental disorder of scholastic skills     No Comments Provided     Essential (primary) hypertension     No Comments Provided     Gastrointestinal hemorrhage     2012,3 units pRBC     Gout     No Comments Provided     Hyperlipidemia     No Comments Provided     Obesity     No Comments Provided     Type 2 diabetes mellitus without complications (H)     No Comments Provided       Past Surgical History:   Procedure Laterality Date     APPENDECTOMY OPEN       DILATION AND CURETTAGE       EXTRACAPSULAR CATARACT EXTRATION WITH INTRAOCULAR LENS IMPLANT Bilateral      LAPAROSCOPIC TUBAL LIGATION         No Known Allergies    Current Outpatient Medications   Medication Sig Dispense Refill     acetaminophen-codeine (TYLENOL #3) 300-30 MG tablet Take 1-2 tablets by mouth every 6 hours as needed for severe pain 8 tablet 0     allopurinol (ZYLOPRIM) 100 MG tablet TAKE 1 TABLET(100 MG) BY MOUTH DAILY 90 tablet 3     aspirin 81 MG tablet Take 81 mg by mouth daily with food       captopril (CAPOTEN) 50 MG tablet Take 1 tablet (50 mg) by mouth 2 times daily 180 tablet 3     CARTIA  MG 24 hr capsule TAKE 1 CAPSULE(240 MG) BY MOUTH DAILY 90 capsule 0     clotrimazole (LOTRIMIN) 1 % cream Apply topically 2 times daily 60 g 11     docusate sodium (COLACE) 100 MG capsule Take 1 capsule (100 mg) by mouth 2 times daily 10 capsule 0     FEROSUL  325 (65 Fe) MG tablet TAKE 1 TABLET(325 MG) BY MOUTH TWICE DAILY WITH MEALS 100 tablet 3     fluconazole (DIFLUCAN) 150 MG tablet Take 1 tab PO q week prn rash 4 tablet 0     furosemide (LASIX) 20 MG tablet Take 1 tablet by mouth daily as needed for edema       LOTRIMIN AF 2 % powder APPLY TOPICALLY TO THE AFFECTED AREA TWICE DAILY IF NEEDED 90 g 3     metoprolol succinate ER (TOPROL-XL) 200 MG 24 hr tablet TAKE 1 TABLET(200 MG) BY MOUTH DAILY 90 tablet 3     omeprazole (PRILOSEC) 20 MG DR capsule TAKE 1 CAPSULE(20 MG) BY MOUTH DAILY BEFORE A MEAL 90 capsule 3     order for DME Urinary incontinence pad / panty liner       prazosin (MINIPRESS) 5 MG capsule TAKE 2 CAPSULES BY MOUTH EVERY MORNING AND 1 CAPSULE IN THE EVENING 270 capsule 3     triamterene-HCTZ (DYAZIDE) 37.5-25 MG capsule TAKE 1 CAPSULE BY MOUTH EVERY MORNING 90 capsule 3       Review of Systems   Constitutional: Negative.    Eyes: Negative.    Endocrine: Negative.    Allergic/Immunologic: Negative.        Physical Exam  Vitals signs and nursing note reviewed.   Constitutional:       General: She is not in acute distress.     Appearance: Normal appearance. She is not ill-appearing, toxic-appearing or diaphoretic.   Musculoskeletal:      Comments: Her lower leg just below the knee was palpated and benign with no tenderness.  Her ankle area was benign with no tenderness.  She did have some mild edema associated with the cam boot   Neurological:      Mental Status: She is alert.         Assessment:        ICD-10-CM    1. Injury of left ankle, subsequent encounter S99.912D XR Ankle Left G/E 3 Views     XR Tibia & Fibula Left 2 Views       Plan: X-rays are reviewed and show healing fractures.  She is doing well.  She can start going without the cam boot for increasing intervals each day as tolerated.  If she has problems, she will return.  Otherwise recheck of diabetes will be due in March.

## 2019-11-26 NOTE — NURSING NOTE
"The patient is here today to be seen for a follow up on her left leg.  Jessica Correa LPN on 11/26/2019 at 10:46 AM  Chief Complaint   Patient presents with     RECHECK     left leg       Initial There were no vitals taken for this visit. Estimated body mass index is 33.07 kg/m  as calculated from the following:    Height as of 10/16/19: 1.549 m (5' 1\").    Weight as of 11/5/19: 79.4 kg (175 lb).  Medication Reconciliation: complete    Jessica Correa LPN    "

## 2019-12-17 DIAGNOSIS — I48.20 CHRONIC ATRIAL FIBRILLATION (H): ICD-10-CM

## 2019-12-18 DIAGNOSIS — I10 BENIGN ESSENTIAL HYPERTENSION: ICD-10-CM

## 2019-12-19 RX ORDER — CAPTOPRIL 50 MG/1
TABLET ORAL
Qty: 180 TABLET | Refills: 0 | Status: SHIPPED | OUTPATIENT
Start: 2019-12-19 | End: 2020-06-11

## 2019-12-19 NOTE — TELEPHONE ENCOUNTER
"Requested Prescriptions   Pending Prescriptions Disp Refills     captopril (CAPOTEN) 50 MG tablet [Pharmacy Med Name: CAPTOPRIL 50MG TABLETS] 180 tablet 0     Sig: TAKE 1 TABLET(50 MG) BY MOUTH TWICE DAILY       ACE Inhibitors (Including Combos) Protocol Failed - 12/19/2019  9:06 AM        Failed - Normal serum creatinine on file in past 12 months     Recent Labs   Lab Test 10/16/19  0912   CR 1.31*             Passed - Blood pressure under 140/90 in past 12 months     BP Readings from Last 3 Encounters:   11/26/19 120/84   11/05/19 108/76   10/16/19 120/70                 Passed - Recent (12 mo) or future (30 days) visit within the authorizing provider's specialty     Patient has had an office visit with the authorizing provider or a provider within the authorizing providers department within the previous 12 mos or has a future within next 30 days. See \"Patient Info\" tab in inbasket, or \"Choose Columns\" in Meds & Orders section of the refill encounter.              Passed - Medication is active on med list        Passed - Patient is age 18 or older        Passed - No active pregnancy on record        Passed - Normal serum potassium on file in past 12 months     Recent Labs   Lab Test 10/16/19  0912   POTASSIUM 4.8             Passed - No positive pregnancy test within past 12 months      Creatinine has improved in the past 6 month, with provider stating kidney function has improved with no changes in medication.  Will refill limited supply up to the recommended 6 months per recommendation due to failed protocol and provider recommendation. Prescription refilled per RN Medication RefillPolicy.................... Heena Castañeda RN ....................  12/19/2019   9:12 AM      "

## 2019-12-19 NOTE — TELEPHONE ENCOUNTER
Routing refill request to provider for review/approval because:  Labs out of range:  Creatinine, ALT    LOV: 11/26/19  Cecy Gresham RN on 12/19/2019 at 2:02 PM

## 2019-12-20 RX ORDER — DILTIAZEM HYDROCHLORIDE 240 MG/1
CAPSULE, EXTENDED RELEASE ORAL
Qty: 90 CAPSULE | Refills: 3 | Status: SHIPPED | OUTPATIENT
Start: 2019-12-20 | End: 2020-06-30

## 2019-12-22 DIAGNOSIS — I48.20 CHRONIC ATRIAL FIBRILLATION (H): ICD-10-CM

## 2019-12-24 RX ORDER — DILTIAZEM HYDROCHLORIDE 240 MG/1
CAPSULE, EXTENDED RELEASE ORAL
Qty: 90 CAPSULE | Refills: 3 | OUTPATIENT
Start: 2019-12-24

## 2019-12-24 NOTE — TELEPHONE ENCOUNTER
"Requested Prescriptions   Pending Prescriptions Disp Refills     CARTIA  MG 24 hr capsule [Pharmacy Med Name: CARTIA  (DILTIAZEM) 240MGXT CAPS] 90 capsule 3     Sig: TAKE 1 CAPSULE(240 MG) BY MOUTH DAILY       Calcium Channel Blockers Protocol  Failed - 12/22/2019 10:18 AM        Failed - Normal ALT in past 12 months     Recent Labs   Lab Test 03/22/19  0929  02/20/17  0926   ALT 6*   < >  --    GICHALT  --   --  6*    < > = values in this interval not displayed.             Failed - Normal serum creatinine on file in past 12 months     Recent Labs   Lab Test 10/16/19  0912   CR 1.31*             Passed - Blood pressure under 140/90 in past 12 months     BP Readings from Last 3 Encounters:   11/26/19 120/84   11/05/19 108/76   10/16/19 120/70                 Passed - Recent (12 mo) or future (30 days) visit within the authorizing provider's specialty     Patient has had an office visit with the authorizing provider or a provider within the authorizing providers department within the previous 12 mos or has a future within next 30 days. See \"Patient Info\" tab in inbasket, or \"Choose Columns\" in Meds & Orders section of the refill encounter.              Passed - Medication is active on med list        Passed - Patient is age 18 or older        Passed - No active pregnancy on record        Passed - No positive pregnancy test in past 12 months        Last Written Prescription Date:  12/20/2019  Last Fill Quantity: 90,   # refills: 3    Called and talked with [facility]'s pharmacy.  [facility] states they did receive the refill request from 12/20/2019 to disregard this request.  Will deny this is a duplicate.  Adelaide Corbett RN on 12/24/2019 at 9:04 AM          "

## 2020-01-12 NOTE — NURSING NOTE
"Patient presents to clinic for f/u diabetes.  Amina Perez LPN ...... 11/14/2018 9:12 AM      Previous A1C is at goal of <8  No components found for: HGBA1C  Urine microalbumin:creatine: NA  Foot exam   Eye exam: April 2018    Patient is not a current smoker  Patient is on a daily aspirin  Patient is not on a Statin.  Blood pressure today of   is at the goal of <139/89 for diabetics.    Alina Perez LPN..............11/14/2018 9:15 AM  Chief Complaint   Patient presents with     Follow Up For     diabetes       Initial /82 (BP Location: Right arm, Patient Position: Sitting, Cuff Size: Adult Regular)  Pulse 84  Resp 18  Wt 190 lb (86.2 kg)  BMI 35.9 kg/m2 Estimated body mass index is 35.9 kg/(m^2) as calculated from the following:    Height as of 9/21/18: 5' 1\" (1.549 m).    Weight as of this encounter: 190 lb (86.2 kg).  Medication Reconciliation: complete    Alina Perez LPN      " Jus

## 2020-02-01 DIAGNOSIS — D50.9 IRON DEFICIENCY ANEMIA, UNSPECIFIED IRON DEFICIENCY ANEMIA TYPE: ICD-10-CM

## 2020-02-03 RX ORDER — FERROUS SULFATE 325(65) MG
TABLET ORAL
Qty: 100 TABLET | Refills: 3 | OUTPATIENT
Start: 2020-02-03

## 2020-02-03 NOTE — TELEPHONE ENCOUNTER
Refill denied at this time    RX on 7/25/2019 100# 3 refills    Sabrina Bruner RN on 2/3/2020 at 8:43 AM

## 2020-02-05 DIAGNOSIS — D50.9 IRON DEFICIENCY ANEMIA, UNSPECIFIED IRON DEFICIENCY ANEMIA TYPE: ICD-10-CM

## 2020-02-06 RX ORDER — FERROUS SULFATE 325(65) MG
TABLET ORAL
Qty: 100 TABLET | Refills: 3 | Status: SHIPPED | OUTPATIENT
Start: 2020-02-06 | End: 2020-06-30

## 2020-02-06 NOTE — TELEPHONE ENCOUNTER
[facility] sent Rx request for the following:      FERROUS SULFATE 325MG (5GR) TABS  Sig: TAKE 1 TABLET(325 MG) BY MOUTH TWICE DAILY WITH MEALS     Last Prescription Date:   7/25/2019  Last Fill Qty/Refills:         100, R-3    Last Office Visit:              11/26/2019  Future Office visit:           none        Prescription refilled per RN Medication Refill Policy.................... Wilfrid Cabrera RN ....................  2/6/2020   11:00 AM

## 2020-02-21 NOTE — PROGRESS NOTES
Chief Complaint: ER follow-up visit.    HPI: She is here for follow-up.  She fell and injured her left leg.  She apparently has a distal fibula fracture as well as possibly a proximal fibular fracture.  She is using an ankle boot only and is ambulatory with this.  The pain is definitely improved.  She is here today to have this rechecked.    Past Medical History:   Diagnosis Date     Atrial fibrillation (H)     No anticoagulation due to GI bleed     Chronic kidney disease, stage III (moderate) (H)     No Comments Provided     Developmental disorder of scholastic skills     No Comments Provided     Essential (primary) hypertension     No Comments Provided     Gastrointestinal hemorrhage     2012,3 units pRBC     Gout     No Comments Provided     Hyperlipidemia     No Comments Provided     Obesity     No Comments Provided     Type 2 diabetes mellitus without complications (H)     No Comments Provided       Past Surgical History:   Procedure Laterality Date     APPENDECTOMY OPEN       DILATION AND CURETTAGE       EXTRACAPSULAR CATARACT EXTRATION WITH INTRAOCULAR LENS IMPLANT Bilateral      LAPAROSCOPIC TUBAL LIGATION         No Known Allergies    Current Outpatient Medications   Medication Sig Dispense Refill     acetaminophen-codeine (TYLENOL #3) 300-30 MG tablet Take 1-2 tablets by mouth every 6 hours as needed for severe pain 8 tablet 0     allopurinol (ZYLOPRIM) 100 MG tablet TAKE 1 TABLET(100 MG) BY MOUTH DAILY 90 tablet 3     aspirin 81 MG tablet Take 81 mg by mouth daily with food       captopril (CAPOTEN) 50 MG tablet Take 1 tablet (50 mg) by mouth 2 times daily 180 tablet 3     CARTIA  MG 24 hr capsule TAKE 1 CAPSULE(240 MG) BY MOUTH DAILY 90 capsule 0     clotrimazole (LOTRIMIN) 1 % cream Apply topically 2 times daily 60 g 11     docusate sodium (COLACE) 100 MG capsule Take 1 capsule (100 mg) by mouth 2 times daily 10 capsule 0     FEROSUL 325 (65 Fe) MG tablet TAKE 1 TABLET(325 MG) BY MOUTH TWICE DAILY  WITH MEALS 100 tablet 3     fluconazole (DIFLUCAN) 150 MG tablet Take 1 tab PO q week prn rash 4 tablet 0     furosemide (LASIX) 20 MG tablet Take 1 tablet by mouth daily as needed for edema       LOTRIMIN AF 2 % powder APPLY TOPICALLY TO THE AFFECTED AREA TWICE DAILY IF NEEDED 90 g 3     metoprolol succinate ER (TOPROL-XL) 200 MG 24 hr tablet TAKE 1 TABLET(200 MG) BY MOUTH DAILY 90 tablet 3     omeprazole (PRILOSEC) 20 MG DR capsule TAKE 1 CAPSULE(20 MG) BY MOUTH DAILY BEFORE A MEAL 90 capsule 3     order for DME Urinary incontinence pad / panty liner       prazosin (MINIPRESS) 5 MG capsule TAKE 2 CAPSULES BY MOUTH EVERY MORNING AND 1 CAPSULE IN THE EVENING 270 capsule 3     triamterene-HCTZ (DYAZIDE) 37.5-25 MG capsule TAKE 1 CAPSULE BY MOUTH EVERY MORNING 90 capsule 3       Review of Systems   Constitutional: Negative.    Eyes: Negative.    Endocrine: Negative.    Allergic/Immunologic: Negative.        Physical Exam  Vitals signs and nursing note reviewed.   Constitutional:       General: She is not in acute distress.     Appearance: Normal appearance. She is not ill-appearing, toxic-appearing or diaphoretic.   Musculoskeletal:      Comments: The boot was removed from her left ankle.  There was some edema proximal to the boot compression area.  The ankle itself had some scattered ecchymosis but no deformity or pain.   Neurological:      Mental Status: She is alert.         Assessment:        ICD-10-CM    1. Injury of left ankle, subsequent encounter S99.912D XR Ankle Left G/E 3 Views     XR Tibia & Fibula Left 2 Views       Plan: X-rays were reviewed with radiology.  The distal fibular fracture appears to be healing adequately.  There is still a question of a proximal fibular fracture although this could be old.  She is not tender over this area, there is no ecchymosis or swelling so I would tend to favor this being old.  I will have her back in 3 weeks for recheck on this.  Continue wearing the cam boot walker  in the meantime.   Statement Selected

## 2020-03-11 ENCOUNTER — TRANSFERRED RECORDS (OUTPATIENT)
Dept: HEALTH INFORMATION MANAGEMENT | Facility: OTHER | Age: 85
End: 2020-03-11

## 2020-03-11 LAB — RETINOPATHY: NEGATIVE

## 2020-03-17 DIAGNOSIS — I10 BENIGN ESSENTIAL HYPERTENSION: Primary | ICD-10-CM

## 2020-03-18 RX ORDER — METOPROLOL SUCCINATE 200 MG/1
TABLET, EXTENDED RELEASE ORAL
Qty: 90 TABLET | Refills: 0 | Status: SHIPPED | OUTPATIENT
Start: 2020-03-18 | End: 2020-06-30

## 2020-03-18 NOTE — TELEPHONE ENCOUNTER
Metoprolol       Last Written Prescription Date:  4/3/19  Last Fill Quantity: 90,   # refills: 3  Last Office Visit: 11/26/2019  Future Office visit:       Routing refill request to provider for review/approval because:  Drug to drug interaction with Prazosin, patient has rescheduled march appt to July. Kiya Quiroga RN on 3/18/2020 at 10:47 AM

## 2020-06-09 DIAGNOSIS — I10 BENIGN ESSENTIAL HYPERTENSION: ICD-10-CM

## 2020-06-09 RX ORDER — CAPTOPRIL 50 MG/1
TABLET ORAL
Qty: 180 TABLET | Refills: 0 | Status: CANCELLED | OUTPATIENT
Start: 2020-06-09

## 2020-06-11 RX ORDER — CAPTOPRIL 50 MG/1
TABLET ORAL
Qty: 180 TABLET | Refills: 3 | Status: SHIPPED | OUTPATIENT
Start: 2020-06-11 | End: 2020-06-30

## 2020-06-11 NOTE — TELEPHONE ENCOUNTER
Routing refill request to provider for review/approval because:  Labs out of range:  Creatinine    LOV; 11/26/2019    Cecy Gresham RN on 6/11/2020 at 9:20 AM

## 2020-06-28 DIAGNOSIS — D50.9 IRON DEFICIENCY ANEMIA, UNSPECIFIED IRON DEFICIENCY ANEMIA TYPE: ICD-10-CM

## 2020-06-28 DIAGNOSIS — I10 BENIGN ESSENTIAL HYPERTENSION: ICD-10-CM

## 2020-06-30 ENCOUNTER — OFFICE VISIT (OUTPATIENT)
Dept: INTERNAL MEDICINE | Facility: OTHER | Age: 85
End: 2020-06-30
Attending: INTERNAL MEDICINE
Payer: MEDICARE

## 2020-06-30 VITALS
WEIGHT: 178.2 LBS | RESPIRATION RATE: 20 BRPM | TEMPERATURE: 97.4 F | HEART RATE: 76 BPM | OXYGEN SATURATION: 95 % | SYSTOLIC BLOOD PRESSURE: 130 MMHG | DIASTOLIC BLOOD PRESSURE: 70 MMHG | BODY MASS INDEX: 33.67 KG/M2

## 2020-06-30 DIAGNOSIS — N18.30 CHRONIC KIDNEY DISEASE, STAGE III (MODERATE) (H): ICD-10-CM

## 2020-06-30 DIAGNOSIS — E11.9 TYPE 2 DIABETES MELLITUS WITHOUT COMPLICATION, WITHOUT LONG-TERM CURRENT USE OF INSULIN (H): ICD-10-CM

## 2020-06-30 DIAGNOSIS — D50.9 IRON DEFICIENCY ANEMIA, UNSPECIFIED IRON DEFICIENCY ANEMIA TYPE: ICD-10-CM

## 2020-06-30 DIAGNOSIS — I10 BENIGN ESSENTIAL HYPERTENSION: ICD-10-CM

## 2020-06-30 DIAGNOSIS — Z87.19 H/O: GI BLEED: ICD-10-CM

## 2020-06-30 DIAGNOSIS — I48.20 CHRONIC ATRIAL FIBRILLATION (H): Primary | ICD-10-CM

## 2020-06-30 DIAGNOSIS — N39.41 URGE INCONTINENCE OF URINE: ICD-10-CM

## 2020-06-30 DIAGNOSIS — M1A.9XX0 CHRONIC GOUT WITHOUT TOPHUS, UNSPECIFIED CAUSE, UNSPECIFIED SITE: ICD-10-CM

## 2020-06-30 DIAGNOSIS — I10 ESSENTIAL HYPERTENSION: ICD-10-CM

## 2020-06-30 DIAGNOSIS — M47.812 SPONDYLOSIS OF CERVICAL REGION WITHOUT MYELOPATHY OR RADICULOPATHY: ICD-10-CM

## 2020-06-30 LAB
ALBUMIN SERPL-MCNC: 3.8 G/DL (ref 3.5–5.7)
ALP SERPL-CCNC: 45 U/L (ref 34–104)
ALT SERPL W P-5'-P-CCNC: 5 U/L (ref 7–52)
ANION GAP SERPL CALCULATED.3IONS-SCNC: 7 MMOL/L (ref 3–14)
AST SERPL W P-5'-P-CCNC: 9 U/L (ref 13–39)
BILIRUB SERPL-MCNC: 0.4 MG/DL (ref 0.3–1)
BUN SERPL-MCNC: 31 MG/DL (ref 7–25)
CALCIUM SERPL-MCNC: 9 MG/DL (ref 8.6–10.3)
CHLORIDE SERPL-SCNC: 103 MMOL/L (ref 98–107)
CO2 SERPL-SCNC: 30 MMOL/L (ref 21–31)
CREAT SERPL-MCNC: 1.47 MG/DL (ref 0.6–1.2)
ERYTHROCYTE [DISTWIDTH] IN BLOOD BY AUTOMATED COUNT: 12.7 % (ref 10–15)
GFR SERPL CREATININE-BSD FRML MDRD: 33 ML/MIN/{1.73_M2}
GLUCOSE SERPL-MCNC: 131 MG/DL (ref 70–105)
HCT VFR BLD AUTO: 35.1 % (ref 35–47)
HGB BLD-MCNC: 11.3 G/DL (ref 11.7–15.7)
MCH RBC QN AUTO: 32.8 PG (ref 26.5–33)
MCHC RBC AUTO-ENTMCNC: 32.2 G/DL (ref 31.5–36.5)
MCV RBC AUTO: 102 FL (ref 78–100)
PLATELET # BLD AUTO: 142 10E9/L (ref 150–450)
POTASSIUM SERPL-SCNC: 5.2 MMOL/L (ref 3.5–5.1)
PROT SERPL-MCNC: 6.8 G/DL (ref 6.4–8.9)
RBC # BLD AUTO: 3.45 10E12/L (ref 3.8–5.2)
SODIUM SERPL-SCNC: 140 MMOL/L (ref 134–144)
WBC # BLD AUTO: 6.2 10E9/L (ref 4–11)

## 2020-06-30 PROCEDURE — 36415 COLL VENOUS BLD VENIPUNCTURE: CPT | Mod: ZL | Performed by: INTERNAL MEDICINE

## 2020-06-30 PROCEDURE — 85027 COMPLETE CBC AUTOMATED: CPT | Mod: ZL | Performed by: INTERNAL MEDICINE

## 2020-06-30 PROCEDURE — 83036 HEMOGLOBIN GLYCOSYLATED A1C: CPT | Mod: ZL | Performed by: INTERNAL MEDICINE

## 2020-06-30 PROCEDURE — G0463 HOSPITAL OUTPT CLINIC VISIT: HCPCS

## 2020-06-30 PROCEDURE — 80053 COMPREHEN METABOLIC PANEL: CPT | Mod: ZL | Performed by: INTERNAL MEDICINE

## 2020-06-30 PROCEDURE — 99215 OFFICE O/P EST HI 40 MIN: CPT | Performed by: INTERNAL MEDICINE

## 2020-06-30 RX ORDER — PRAZOSIN HYDROCHLORIDE 5 MG/1
CAPSULE ORAL
Qty: 270 CAPSULE | Refills: 3 | Status: SHIPPED | OUTPATIENT
Start: 2020-06-30 | End: 2021-06-08

## 2020-06-30 RX ORDER — TRIAMTERENE AND HYDROCHLOROTHIAZIDE 37.5; 25 MG/1; MG/1
1 CAPSULE ORAL DAILY
Qty: 90 CAPSULE | Refills: 3 | Status: SHIPPED | OUTPATIENT
Start: 2020-06-30 | End: 2021-06-08

## 2020-06-30 RX ORDER — FERROUS SULFATE 325(65) MG
325 TABLET ORAL
Qty: 100 TABLET | Refills: 3 | COMMUNITY
Start: 2020-06-30 | End: 2020-06-30

## 2020-06-30 RX ORDER — ALLOPURINOL 100 MG/1
100 TABLET ORAL DAILY
Qty: 90 TABLET | Refills: 3 | Status: SHIPPED | OUTPATIENT
Start: 2020-06-30 | End: 2021-09-21

## 2020-06-30 RX ORDER — DILTIAZEM HYDROCHLORIDE 240 MG/1
240 CAPSULE, COATED, EXTENDED RELEASE ORAL DAILY
Qty: 90 CAPSULE | Refills: 3 | Status: SHIPPED | OUTPATIENT
Start: 2020-06-30 | End: 2021-06-08

## 2020-06-30 RX ORDER — FERROUS SULFATE 325(65) MG
325 TABLET ORAL
Qty: 100 TABLET | Refills: 3 | Status: SHIPPED | OUTPATIENT
Start: 2020-06-30 | End: 2021-05-27

## 2020-06-30 RX ORDER — CAPTOPRIL 50 MG/1
50 TABLET ORAL 2 TIMES DAILY
Qty: 180 TABLET | Refills: 3 | Status: SHIPPED | OUTPATIENT
Start: 2020-06-30 | End: 2021-06-08

## 2020-06-30 RX ORDER — METOPROLOL SUCCINATE 200 MG/1
TABLET, EXTENDED RELEASE ORAL
Qty: 90 TABLET | Refills: 0 | OUTPATIENT
Start: 2020-06-30

## 2020-06-30 RX ORDER — FERROUS SULFATE 325(65) MG
TABLET ORAL
Qty: 100 TABLET | Refills: 3 | OUTPATIENT
Start: 2020-06-30

## 2020-06-30 RX ORDER — METOPROLOL SUCCINATE 200 MG/1
200 TABLET, EXTENDED RELEASE ORAL DAILY
Qty: 90 TABLET | Refills: 3 | Status: SHIPPED | OUTPATIENT
Start: 2020-06-30 | End: 2021-05-27

## 2020-06-30 RX ORDER — LIDOCAINE 50 MG/G
1 PATCH TOPICAL EVERY 24 HOURS
Qty: 30 PATCH | Refills: 11 | Status: SHIPPED | OUTPATIENT
Start: 2020-06-30 | End: 2021-06-08

## 2020-06-30 ASSESSMENT — PAIN SCALES - GENERAL: PAINLEVEL: EXTREME PAIN (9)

## 2020-06-30 ASSESSMENT — ENCOUNTER SYMPTOMS
WEAKNESS: 0
PALPITATIONS: 0
HEADACHES: 0
DIARRHEA: 0
VOMITING: 0
TROUBLE SWALLOWING: 0
FREQUENCY: 0
HALLUCINATIONS: 0
CONSTIPATION: 0
BRUISES/BLEEDS EASILY: 0
NAUSEA: 0
SEIZURES: 0
FACIAL SWELLING: 0
CHILLS: 0
SORE THROAT: 0
JOINT SWELLING: 0
ABDOMINAL DISTENTION: 0
NECK PAIN: 1
NUMBNESS: 0
SINUS PRESSURE: 0
CHEST TIGHTNESS: 0
CONFUSION: 0
BLOOD IN STOOL: 0
ADENOPATHY: 0
DIFFICULTY URINATING: 0
WHEEZING: 0
DIAPHORESIS: 0
COUGH: 0
EYE PAIN: 0
SHORTNESS OF BREATH: 0
TREMORS: 0
EYE REDNESS: 0
RHINORRHEA: 0
NECK STIFFNESS: 1
FLANK PAIN: 0
SLEEP DISTURBANCE: 0
SINUS PAIN: 0
ABDOMINAL PAIN: 0
AGITATION: 0
HEMATURIA: 0
DIZZINESS: 0
BACK PAIN: 0
FEVER: 0
DYSURIA: 0
FATIGUE: 0

## 2020-06-30 NOTE — PROGRESS NOTES
Chief Complaint:  This patient is here for a comprehensive review of their multiple medical problems, renewal of medications and update on necessary health maintenance issues.      HPI: She comes in today for complete evaluation and yearly review of her chronic medical problems.  She has chronic atrial fibrillation.  She is on rate control but she is not on any anticoagulation both due to her age, high fall risk and her GI bleeding.  She has a history of chronic GI bleeding which she we are not evaluating further.  She is treated with omeprazole daily as well as iron daily.  She does take a baby aspirin daily which she seems to tolerate.  She is due for a recheck on her hemoglobin at this point in time.    Patient has a history of hypertension.  She is on multidrug therapy for control of her blood pressure.  She does have chronic kidney disease associated with this.  She also has a history of type 2 diabetes mellitus and was previously on metformin.  That was stopped due to her kidney issues.  A1c values have been acceptable without any therapy.    1 of her biggest problems is related to arthritis.  This is especially bad in her neck.  Tylenol does not seem to help.  Due to her other medical problems including a history of GI bleeding and chronic kidney disease she is not a candidate for anything along the lines of anti-inflammatory medications.  I suggested that we could try some lidocaine patches to see if those would help.    Other than that she seems to be fine other than just aging.  With her aging she does have balance problems and troubles with her knees and the strength in her legs.  She also has urinary incontinence and needs a prescription for urinary pads.  She uses 4 pads daily.    Medications are reconciled.  Past medical history, past surgical history, family history and social histories are reviewed and updated.  She is due for lab work as mentioned.    Past Medical History:   Diagnosis Date      Atrial fibrillation (H)     No anticoagulation due to GI bleed     Chronic kidney disease, stage III (moderate) (H)     No Comments Provided     Developmental disorder of scholastic skills     No Comments Provided     Essential (primary) hypertension     No Comments Provided     Gastrointestinal hemorrhage     2012,3 units pRBC     Gout     No Comments Provided     Hyperlipidemia     No Comments Provided     Obesity     No Comments Provided     Type 2 diabetes mellitus without complications (H)     No Comments Provided       Past Surgical History:   Procedure Laterality Date     APPENDECTOMY OPEN       DILATION AND CURETTAGE       EXTRACAPSULAR CATARACT EXTRATION WITH INTRAOCULAR LENS IMPLANT Bilateral      LAPAROSCOPIC TUBAL LIGATION         Current Outpatient Medications   Medication Sig Dispense Refill     allopurinol (ZYLOPRIM) 100 MG tablet Take 1 tablet (100 mg) by mouth daily 90 tablet 3     aspirin 81 MG tablet Take 81 mg by mouth daily with food       captopril (CAPOTEN) 50 MG tablet Take 1 tablet (50 mg) by mouth 2 times daily 180 tablet 3     clotrimazole (LOTRIMIN) 1 % cream Apply topically 2 times daily 60 g 11     diltiazem ER COATED BEADS (CARTIA XT) 240 MG 24 hr capsule Take 1 capsule (240 mg) by mouth daily 90 capsule 3     ferrous sulfate (FEROSUL) 325 (65 Fe) MG tablet Take 1 tablet (325 mg) by mouth daily (with breakfast) 100 tablet 3     furosemide (LASIX) 20 MG tablet Take 1 tablet by mouth daily as needed for edema       lidocaine (LIDODERM) 5 % patch Place 1 patch onto the skin every 24 hours Apply to neck in morning, remove in evening 30 patch 11     metoprolol succinate ER (TOPROL-XL) 200 MG 24 hr tablet Take 1 tablet (200 mg) by mouth daily 90 tablet 3     omeprazole (PRILOSEC) 20 MG DR capsule Take 1 capsule (20 mg) by mouth daily 90 capsule 3     order for DME Urinary incontinence pad / panty liner       prazosin (MINIPRESS) 5 MG capsule 2 in morning, 1 in evening 270 capsule 3      triamterene-HCTZ (DYAZIDE) 37.5-25 MG capsule Take 1 capsule by mouth daily 90 capsule 3       No Known Allergies    Family History   Problem Relation Age of Onset     Hypertension Brother      Heart Disease Brother      Other - See Comments Brother         intracerebral hemorrhage/ obesity     Diabetes Sister      Diabetes Sister      Other - See Comments Sister          from pneumonia     Diabetes Father      Heart Disease Father      Diabetes Mother      Heart Disease Mother      Diabetes Son         Diabetes     Heart Disease Brother        Social History     Socioeconomic History     Marital status:      Spouse name: Not on file     Number of children: Not on file     Years of education: Not on file     Highest education level: Not on file   Occupational History     Not on file   Social Needs     Financial resource strain: Not on file     Food insecurity     Worry: Not on file     Inability: Not on file     Transportation needs     Medical: Not on file     Non-medical: Not on file   Tobacco Use     Smoking status: Never Smoker     Smokeless tobacco: Never Used   Substance and Sexual Activity     Alcohol use: No     Drug use: No     Comment: Drug use: No     Sexual activity: Not Currently   Lifestyle     Physical activity     Days per week: Not on file     Minutes per session: Not on file     Stress: Not on file   Relationships     Social connections     Talks on phone: Not on file     Gets together: Not on file     Attends [employer] service: Not on file     Active member of club or organization: Not on file     Attends meetings of clubs or organizations: Not on file     Relationship status: Not on file     Intimate partner violence     Fear of current or ex partner: Not on file     Emotionally abused: Not on file     Physically abused: Not on file     Forced sexual activity: Not on file   Other Topics Concern     Parent/sibling w/ CABG, MI or angioplasty before 65F 55M? Not Asked   Social History  Narrative    Cigarettes-none.  Alcohol-none.   and lives in an apartment in town.       Review of Systems   Constitutional: Negative for chills, diaphoresis, fatigue and fever.   HENT: Negative for congestion, ear pain, facial swelling, mouth sores, nosebleeds, rhinorrhea, sinus pressure, sinus pain, sore throat and trouble swallowing.    Eyes: Negative for pain, redness and visual disturbance.   Respiratory: Negative for cough, chest tightness, shortness of breath and wheezing.    Cardiovascular: Negative for chest pain, palpitations and leg swelling.   Gastrointestinal: Negative for abdominal distention, abdominal pain, blood in stool, constipation, diarrhea, nausea and vomiting.   Endocrine: Negative for cold intolerance and heat intolerance.   Genitourinary: Negative for difficulty urinating, dysuria, flank pain, frequency, hematuria, pelvic pain, vaginal bleeding, vaginal discharge and vaginal pain.   Musculoskeletal: Positive for neck pain and neck stiffness. Negative for back pain, gait problem and joint swelling.   Skin: Negative for rash.   Neurological: Negative for dizziness, tremors, seizures, syncope, weakness, numbness and headaches.   Hematological: Negative for adenopathy. Does not bruise/bleed easily.   Psychiatric/Behavioral: Negative for agitation, confusion, hallucinations and sleep disturbance.       Physical Exam  Vitals signs and nursing note reviewed.   Constitutional:       General: She is not in acute distress.     Appearance: She is well-developed. She is not diaphoretic.   HENT:      Head: Normocephalic.      Right Ear: External ear normal.      Left Ear: External ear normal.      Mouth/Throat:      Pharynx: No oropharyngeal exudate.   Eyes:      Conjunctiva/sclera: Conjunctivae normal.      Pupils: Pupils are equal, round, and reactive to light.   Neck:      Musculoskeletal: Normal range of motion and neck supple.      Thyroid: No thyromegaly.      Vascular: Normal carotid pulses.  No carotid bruit or JVD.      Trachea: No tracheal deviation.   Cardiovascular:      Rate and Rhythm: Normal rate. Rhythm irregularly irregular.      Heart sounds: Normal heart sounds. No murmur. No friction rub. No gallop.    Pulmonary:      Effort: Pulmonary effort is normal. No respiratory distress.      Breath sounds: Normal breath sounds. No wheezing or rales.   Abdominal:      General: Bowel sounds are normal. There is no distension.      Palpations: Abdomen is soft. There is no mass.      Tenderness: There is no abdominal tenderness. There is no rebound.   Musculoskeletal: Normal range of motion.   Lymphadenopathy:      Cervical: No cervical adenopathy.   Skin:     General: Skin is warm and dry.      Findings: No rash.   Neurological:      Mental Status: She is alert and oriented to person, place, and time.      Cranial Nerves: No cranial nerve deficit.      Coordination: Coordination normal.      Deep Tendon Reflexes: Reflexes are normal and symmetric.   Psychiatric:         Behavior: Behavior normal.         Assessment:      ICD-10-CM    1. Chronic atrial fibrillation (H)  I48.20 diltiazem ER COATED BEADS (CARTIA XT) 240 MG 24 hr capsule   2. Iron deficiency anemia, unspecified iron deficiency anemia type  D50.9 CBC with platelets     ferrous sulfate (FEROSUL) 325 (65 Fe) MG tablet     DISCONTINUED: ferrous sulfate (FEROSUL) 325 (65 Fe) MG tablet   3. Chronic kidney disease, stage III (moderate) (H)  N18.3    4. Type 2 diabetes mellitus without complication, without long-term current use of insulin (H)  E11.9 Comprehensive metabolic panel     Hemoglobin A1c   5. Chronic gout without tophus, unspecified cause, unspecified site  M1A.9XX0 allopurinol (ZYLOPRIM) 100 MG tablet   6. Essential hypertension  I10 prazosin (MINIPRESS) 5 MG capsule     triamterene-HCTZ (DYAZIDE) 37.5-25 MG capsule   7. Spondylosis of cervical region without myelopathy or radiculopathy  M47.812 lidocaine (LIDODERM) 5 % patch   8. Urge  incontinence of urine  N39.41 Incontinence Supplies Order   9. Benign essential hypertension  I10 metoprolol succinate ER (TOPROL-XL) 200 MG 24 hr tablet     captopril (CAPOTEN) 50 MG tablet   10. H/O: GI bleed  Z87.19 omeprazole (PRILOSEC) 20 MG DR capsule        Plan: She appears to be relatively stable at this time.  Medications will continue without change, refills were done.  Complete lab drawn and pending and I will send her a letter with the results and recommendation for follow-up.  Trial of Lidoderm patch to the neck for discomfort, on in the morning and off in the evening.  She can continue with Tylenol as needed as well but she admits that this does not seem to help very much.  Approval for incontinence pads were done today.  Follow-up with me will be dependent on her clinical course and her lab results.        DME (Durable Medical Equipment) Orders and Documentation  Orders Placed This Encounter   Procedures     Incontinence Supplies Order      The patient was assessed and it was determined the patient is in need of the following listed DME Supplies/Equipment. Please complete supporting documentation below to demonstrate medical necessity.      Incontinence Supplies Documentation  Incontinence supplies are needed due to urinary incontinence and to allow the patient to remain in an independent living situation.

## 2020-06-30 NOTE — LETTER
July 1, 2020      Lety Luna  [address] [facility] [address]  [place] [address]        Dear Lety,    Below are the results of your recent labs:    Results for orders placed or performed in visit on 06/30/20   Hemoglobin A1c     Status: Abnormal   Result Value Ref Range    Hemoglobin A1C 6.2 (H) 4.0 - 6.0 %   CBC with platelets     Status: Abnormal   Result Value Ref Range    WBC 6.2 4.0 - 11.0 10e9/L    RBC Count 3.45 (L) 3.8 - 5.2 10e12/L    Hemoglobin 11.3 (L) 11.7 - 15.7 g/dL    Hematocrit 35.1 35.0 - 47.0 %     (H) 78 - 100 fl    MCH 32.8 26.5 - 33.0 pg    MCHC 32.2 31.5 - 36.5 g/dL    RDW 12.7 10.0 - 15.0 %    Platelet Count 142 (L) 150 - 450 10e9/L   Comprehensive metabolic panel     Status: Abnormal   Result Value Ref Range    Sodium 140 134 - 144 mmol/L    Potassium 5.2 (H) 3.5 - 5.1 mmol/L    Chloride 103 98 - 107 mmol/L    Carbon Dioxide 30 21 - 31 mmol/L    Anion Gap 7 3 - 14 mmol/L    Glucose 131 (H) 70 - 105 mg/dL    Urea Nitrogen 31 (H) 7 - 25 mg/dL    Creatinine 1.47 (H) 0.60 - 1.20 mg/dL    GFR Estimate 33 (L) >60 mL/min/[1.73_m2]    GFR Estimate If Black 40 (L) >60 mL/min/[1.73_m2]    Calcium 9.0 8.6 - 10.3 mg/dL    Bilirubin Total 0.4 0.3 - 1.0 mg/dL    Albumin 3.8 3.5 - 5.7 g/dL    Protein Total 6.8 6.4 - 8.9 g/dL    Alkaline Phosphatase 45 34 - 104 U/L    ALT 5 (L) 7 - 52 U/L    AST 9 (L) 13 - 39 U/L        Your blood tests look very good.  Your diabetic control is very good.  You do have some moderate weakness of your kidneys but this has been present in the past.  Congratulations on this report.  If all goes well, I want you to return in 6 months to have your diabetes and your kidneys rechecked.    Sincerely,        Rodney Nelson MD  Internal Medicine  [facility] and [facility]

## 2020-06-30 NOTE — NURSING NOTE
Lety Luna is a 89 year old female presenting today for: medication review, diabetes  Medication Reconciliation: complete    Luana Hernandez LPN 6/30/2020 8:00 AM

## 2020-07-01 LAB — HBA1C MFR BLD: 6.2 % (ref 4–6)

## 2020-07-27 ENCOUNTER — TELEPHONE (OUTPATIENT)
Dept: INTERNAL MEDICINE | Facility: OTHER | Age: 85
End: 2020-07-27

## 2020-07-27 DIAGNOSIS — M54.2 CHRONIC NECK PAIN: Primary | ICD-10-CM

## 2020-07-27 DIAGNOSIS — G89.29 CHRONIC NECK PAIN: Primary | ICD-10-CM

## 2020-07-27 NOTE — TELEPHONE ENCOUNTER
After the patient's full name and date of birth was verified,  Lillian was notified of the below information.  Luana Hernandez LPN on 7/27/2020 at 4:19 PM

## 2020-07-27 NOTE — TELEPHONE ENCOUNTER
Fax states:    Dear Dr. Nelson,  My name is Lillian Allen, and I am Lety's niece/caregiver.  I am concerned about the constant pain she is experiencing from the arthritis in her neck.    Would it be possible for her to try Voltarengel (Diclofenac)?  Or perhaps a steroid/pain injection in her neck?    Please let me know if you have any thoughts on the best way to alleviate this pain for her.  I work from 8-4:30 at the [employer], but please call and leave me a voicemail.  Or you can send me an email at [email or web address]    Thank you!  Sincerely,  Lillian Allen  [phone]

## 2020-07-27 NOTE — TELEPHONE ENCOUNTER
Prescription for Voltaren gel sent to her pharmacy.  She is not a candidate for steroid injection.

## 2020-08-22 DIAGNOSIS — I10 ESSENTIAL HYPERTENSION: ICD-10-CM

## 2020-08-24 RX ORDER — PRAZOSIN HYDROCHLORIDE 5 MG/1
CAPSULE ORAL
Qty: 270 CAPSULE | Refills: 3 | OUTPATIENT
Start: 2020-08-24

## 2020-08-24 NOTE — TELEPHONE ENCOUNTER
Prazosin refilled on 6/30/2020 #270 x 3 reffills to [facility].    Cecy Gresham RN on 8/24/2020 at 9:03 AM

## 2020-10-07 ENCOUNTER — ALLIED HEALTH/NURSE VISIT (OUTPATIENT)
Dept: FAMILY MEDICINE | Facility: OTHER | Age: 85
End: 2020-10-07
Attending: INTERNAL MEDICINE
Payer: MEDICARE

## 2020-10-07 DIAGNOSIS — Z23 NEED FOR PROPHYLACTIC VACCINATION AND INOCULATION AGAINST INFLUENZA: Primary | ICD-10-CM

## 2020-10-07 PROCEDURE — 90662 IIV NO PRSV INCREASED AG IM: CPT

## 2020-10-07 PROCEDURE — G0008 ADMIN INFLUENZA VIRUS VAC: HCPCS | Performed by: INTERNAL MEDICINE

## 2020-10-07 NOTE — NURSING NOTE
Immunization Documentation    Prior to Immunization administration, verified patients identity using patient's name and date of birth. Please see IMMUNIZATIONS  and order for additional information.  Patient / Parent instructed to remain in clinic for 15 minutes and report any adverse reaction to staff immediately.    Was entire vial of medication used? Yes  Vial/Syringe: Rich Donahue LPN  10/7/2020   12:28 PM

## 2020-12-21 ENCOUNTER — TELEPHONE (OUTPATIENT)
Dept: INTERNAL MEDICINE | Facility: OTHER | Age: 85
End: 2020-12-21

## 2020-12-21 NOTE — TELEPHONE ENCOUNTER
Called patient and verified name and date of birth. She was wondering if Dr. Nelson recommends the COVID vaccine. I advised her that he does recommend his patients get it and we will notify her once it is available.  Marge Rolon LPN on 12/21/2020 at 8:25 AM

## 2020-12-21 NOTE — TELEPHONE ENCOUNTER
Patient would like a call back regarding the covid 19 vaccination.   Please call patient back. Thank you Carolin Rodriguez on 12/21/2020 at 8:13 AM

## 2021-01-01 ENCOUNTER — APPOINTMENT (OUTPATIENT)
Dept: PHYSICAL THERAPY | Facility: OTHER | Age: 86
DRG: 872 | End: 2021-01-01
Attending: INTERNAL MEDICINE
Payer: MEDICARE

## 2021-01-01 ENCOUNTER — APPOINTMENT (OUTPATIENT)
Dept: OCCUPATIONAL THERAPY | Facility: OTHER | Age: 86
DRG: 872 | End: 2021-01-01
Attending: INTERNAL MEDICINE
Payer: MEDICARE

## 2021-01-01 ENCOUNTER — HOSPITAL ENCOUNTER (INPATIENT)
Facility: OTHER | Age: 86
LOS: 3 days | Discharge: NURSING, REHAB OR LONG-TERM CARE | DRG: 872 | End: 2021-12-31
Attending: PHYSICIAN ASSISTANT | Admitting: STUDENT IN AN ORGANIZED HEALTH CARE EDUCATION/TRAINING PROGRAM
Payer: MEDICARE

## 2021-01-01 ENCOUNTER — APPOINTMENT (OUTPATIENT)
Dept: PHYSICAL THERAPY | Facility: OTHER | Age: 86
DRG: 872 | End: 2021-01-01
Payer: MEDICARE

## 2021-01-01 ENCOUNTER — APPOINTMENT (OUTPATIENT)
Dept: CT IMAGING | Facility: OTHER | Age: 86
DRG: 872 | End: 2021-01-01
Attending: PHYSICIAN ASSISTANT
Payer: MEDICARE

## 2021-01-01 ENCOUNTER — APPOINTMENT (OUTPATIENT)
Dept: OCCUPATIONAL THERAPY | Facility: OTHER | Age: 86
DRG: 872 | End: 2021-01-01
Payer: MEDICARE

## 2021-01-01 VITALS
TEMPERATURE: 98 F | WEIGHT: 171.5 LBS | RESPIRATION RATE: 16 BRPM | HEIGHT: 61 IN | SYSTOLIC BLOOD PRESSURE: 132 MMHG | OXYGEN SATURATION: 97 % | HEART RATE: 92 BPM | BODY MASS INDEX: 32.38 KG/M2 | DIASTOLIC BLOOD PRESSURE: 62 MMHG

## 2021-01-01 DIAGNOSIS — Z16.12 UTI DUE TO EXTENDED-SPECTRUM BETA LACTAMASE (ESBL) PRODUCING ESCHERICHIA COLI: Primary | ICD-10-CM

## 2021-01-01 DIAGNOSIS — M62.81 MUSCLE WEAKNESS (GENERALIZED): ICD-10-CM
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
## 2021-01-01 DIAGNOSIS — Z79.899 ENCOUNTER FOR LONG-TERM (CURRENT) USE OF MEDICATIONS: ICD-10-CM

## 2021-01-01 DIAGNOSIS — E11.22 TYPE 2 DIABETES MELLITUS WITH STAGE 3 CHRONIC KIDNEY DISEASE, UNSPECIFIED WHETHER LONG TERM INSULIN USE, UNSPECIFIED WHETHER STAGE 3A OR 3B CKD (H): ICD-10-CM

## 2021-01-01 DIAGNOSIS — B37.9 CANDIDA INFECTION: ICD-10-CM

## 2021-01-01 DIAGNOSIS — N18.30 TYPE 2 DIABETES MELLITUS WITH STAGE 3 CHRONIC KIDNEY DISEASE, UNSPECIFIED WHETHER LONG TERM INSULIN USE, UNSPECIFIED WHETHER STAGE 3A OR 3B CKD (H): ICD-10-CM

## 2021-01-01 DIAGNOSIS — Z79.82 ENCOUNTER FOR LONG-TERM (CURRENT) USE OF ASPIRIN: ICD-10-CM

## 2021-01-01 DIAGNOSIS — R79.89 ELEVATED TROPONIN: ICD-10-CM

## 2021-01-01 DIAGNOSIS — L89.302 PRESSURE INJURY OF BUTTOCK, STAGE 2, UNSPECIFIED LATERALITY (H): ICD-10-CM

## 2021-01-01 DIAGNOSIS — T79.6XXA TRAUMATIC RHABDOMYOLYSIS, INITIAL ENCOUNTER (H): ICD-10-CM

## 2021-01-01 DIAGNOSIS — A41.9 UNSPECIFIED SEPTICEMIA(038.9) (H): ICD-10-CM

## 2021-01-01 DIAGNOSIS — R79.89 ELEVATED TROPONIN LEVEL: ICD-10-CM

## 2021-01-01 DIAGNOSIS — E83.42 HYPOMAGNESEMIA: ICD-10-CM

## 2021-01-01 DIAGNOSIS — N39.0 URINARY TRACT INFECTION WITHOUT HEMATURIA, SITE UNSPECIFIED: ICD-10-CM

## 2021-01-01 DIAGNOSIS — M62.81 GENERALIZED MUSCLE WEAKNESS: ICD-10-CM

## 2021-01-01 DIAGNOSIS — B37.2 CANDIDIASIS OF SKIN AND NAILS: ICD-10-CM

## 2021-01-01 DIAGNOSIS — N39.0 UTI DUE TO EXTENDED-SPECTRUM BETA LACTAMASE (ESBL) PRODUCING ESCHERICHIA COLI: Primary | ICD-10-CM

## 2021-01-01 DIAGNOSIS — Z11.52 ENCOUNTER FOR SCREENING LABORATORY TESTING FOR COVID-19 VIRUS: ICD-10-CM

## 2021-01-01 DIAGNOSIS — R82.81 PYURIA: ICD-10-CM

## 2021-01-01 DIAGNOSIS — A41.9 BACTERIAL SEPSIS (H): ICD-10-CM

## 2021-01-01 DIAGNOSIS — I48.91 ATRIAL FIBRILLATION, UNSPECIFIED TYPE (H): ICD-10-CM

## 2021-01-01 DIAGNOSIS — B96.29 UTI DUE TO EXTENDED-SPECTRUM BETA LACTAMASE (ESBL) PRODUCING ESCHERICHIA COLI: Primary | ICD-10-CM

## 2021-01-01 DIAGNOSIS — N39.0 URINARY TRACT INFECTION WITH PYURIA: ICD-10-CM

## 2021-01-01 LAB
ALBUMIN SERPL-MCNC: 3.9 G/DL (ref 3.5–5.7)
ALBUMIN UR-MCNC: 70 MG/DL
ALP SERPL-CCNC: 53 U/L (ref 34–104)
ALT SERPL W P-5'-P-CCNC: 17 U/L (ref 7–52)
ANION GAP SERPL CALCULATED.3IONS-SCNC: 12 MMOL/L (ref 3–14)
ANION GAP SERPL CALCULATED.3IONS-SCNC: 12 MMOL/L (ref 3–14)
ANION GAP SERPL CALCULATED.3IONS-SCNC: 7 MMOL/L (ref 3–14)
ANION GAP SERPL CALCULATED.3IONS-SCNC: 8 MMOL/L (ref 3–14)
APPEARANCE UR: ABNORMAL
AST SERPL W P-5'-P-CCNC: 42 U/L (ref 13–39)
BACTERIA #/AREA URNS HPF: ABNORMAL /HPF
BACTERIA UR CULT: ABNORMAL
BASOPHILS # BLD AUTO: 0 10E3/UL (ref 0–0.2)
BASOPHILS NFR BLD AUTO: 0 %
BILIRUB SERPL-MCNC: 0.7 MG/DL (ref 0.3–1)
BILIRUB UR QL STRIP: NEGATIVE
BUN SERPL-MCNC: 43 MG/DL (ref 7–25)
BUN SERPL-MCNC: 48 MG/DL (ref 7–25)
BUN SERPL-MCNC: 57 MG/DL (ref 7–25)
BUN SERPL-MCNC: 62 MG/DL (ref 7–25)
CALCIUM SERPL-MCNC: 8.8 MG/DL (ref 8.6–10.3)
CALCIUM SERPL-MCNC: 8.9 MG/DL (ref 8.6–10.3)
CALCIUM SERPL-MCNC: 9.1 MG/DL (ref 8.6–10.3)
CALCIUM SERPL-MCNC: 9.5 MG/DL (ref 8.6–10.3)
CHLORIDE BLD-SCNC: 107 MMOL/L (ref 98–107)
CHLORIDE BLD-SCNC: 107 MMOL/L (ref 98–107)
CHLORIDE BLD-SCNC: 108 MMOL/L (ref 98–107)
CHLORIDE BLD-SCNC: 108 MMOL/L (ref 98–107)
CK SERPL-CCNC: 1085 U/L (ref 30–223)
CK SERPL-CCNC: 1239 U/L (ref 30–223)
CK SERPL-CCNC: 683 U/L (ref 30–223)
CO2 SERPL-SCNC: 22 MMOL/L (ref 21–31)
CO2 SERPL-SCNC: 23 MMOL/L (ref 21–31)
COLOR UR AUTO: YELLOW
CREAT SERPL-MCNC: 1.47 MG/DL (ref 0.6–1.2)
CREAT SERPL-MCNC: 1.61 MG/DL (ref 0.6–1.2)
CREAT SERPL-MCNC: 2.01 MG/DL (ref 0.6–1.2)
CREAT SERPL-MCNC: 2.52 MG/DL (ref 0.6–1.2)
CRP SERPL-MCNC: 199 MG/L
EOSINOPHIL # BLD AUTO: 0 10E3/UL (ref 0–0.7)
EOSINOPHIL NFR BLD AUTO: 0 %
ERYTHROCYTE [DISTWIDTH] IN BLOOD BY AUTOMATED COUNT: 14.1 % (ref 10–15)
ERYTHROCYTE [DISTWIDTH] IN BLOOD BY AUTOMATED COUNT: 14.2 % (ref 10–15)
ERYTHROCYTE [SEDIMENTATION RATE] IN BLOOD BY WESTERGREN METHOD: 34 MM/HR (ref 0–30)
FLUAV RNA SPEC QL NAA+PROBE: NEGATIVE
FLUBV RNA RESP QL NAA+PROBE: NEGATIVE
GFR SERPL CREATININE-BSD FRML MDRD: 17 ML/MIN/1.73M2
GFR SERPL CREATININE-BSD FRML MDRD: 23 ML/MIN/1.73M2
GFR SERPL CREATININE-BSD FRML MDRD: 30 ML/MIN/1.73M2
GFR SERPL CREATININE-BSD FRML MDRD: 33 ML/MIN/1.73M2
GLUCOSE BLD-MCNC: 100 MG/DL (ref 70–105)
GLUCOSE BLD-MCNC: 129 MG/DL (ref 70–105)
GLUCOSE BLD-MCNC: 85 MG/DL (ref 70–105)
GLUCOSE BLD-MCNC: 88 MG/DL (ref 70–105)
GLUCOSE UR STRIP-MCNC: NEGATIVE MG/DL
HCT VFR BLD AUTO: 33 % (ref 35–47)
HCT VFR BLD AUTO: 33.2 % (ref 35–47)
HGB BLD-MCNC: 10.3 G/DL (ref 11.7–15.7)
HGB BLD-MCNC: 10.6 G/DL (ref 11.7–15.7)
HGB BLD-MCNC: 9.3 G/DL (ref 11.7–15.7)
HGB UR QL STRIP: ABNORMAL
IMM GRANULOCYTES # BLD: 0 10E3/UL
IMM GRANULOCYTES NFR BLD: 0 %
KETONES UR STRIP-MCNC: NEGATIVE MG/DL
LACTATE SERPL-SCNC: 2.4 MMOL/L (ref 0.7–2)
LEUKOCYTE ESTERASE UR QL STRIP: ABNORMAL
LYMPHOCYTES # BLD AUTO: 1 10E3/UL (ref 0.8–5.3)
LYMPHOCYTES NFR BLD AUTO: 10 %
MAGNESIUM SERPL-MCNC: 1.4 MG/DL (ref 1.9–2.7)
MAGNESIUM SERPL-MCNC: 1.7 MG/DL (ref 1.9–2.7)
MAGNESIUM SERPL-MCNC: 1.7 MG/DL (ref 1.9–2.7)
MAGNESIUM SERPL-MCNC: 2 MG/DL (ref 1.9–2.7)
MCH RBC QN AUTO: 32.3 PG (ref 26.5–33)
MCH RBC QN AUTO: 32.7 PG (ref 26.5–33)
MCHC RBC AUTO-ENTMCNC: 31.2 G/DL (ref 31.5–36.5)
MCHC RBC AUTO-ENTMCNC: 31.9 G/DL (ref 31.5–36.5)
MCV RBC AUTO: 103 FL (ref 78–100)
MCV RBC AUTO: 103 FL (ref 78–100)
MONOCYTES # BLD AUTO: 0.8 10E3/UL (ref 0–1.3)
MONOCYTES NFR BLD AUTO: 9 %
MUCOUS THREADS #/AREA URNS LPF: PRESENT /LPF
NEUTROPHILS # BLD AUTO: 7.6 10E3/UL (ref 1.6–8.3)
NEUTROPHILS NFR BLD AUTO: 81 %
NITRATE UR QL: NEGATIVE
NRBC # BLD AUTO: 0 10E3/UL
NRBC BLD AUTO-RTO: 0 /100
NT-PROBNP SERPL-MCNC: 1085 PG/ML (ref 0–100)
PH UR STRIP: 5.5 [PH] (ref 5–9)
PLATELET # BLD AUTO: 145 10E3/UL (ref 150–450)
PLATELET # BLD AUTO: 147 10E3/UL (ref 150–450)
POTASSIUM BLD-SCNC: 4.7 MMOL/L (ref 3.5–5.1)
POTASSIUM BLD-SCNC: 4.8 MMOL/L (ref 3.5–5.1)
POTASSIUM BLD-SCNC: 4.9 MMOL/L (ref 3.5–5.1)
POTASSIUM BLD-SCNC: 5.5 MMOL/L (ref 3.5–5.1)
PROCALCITONIN SERPL-MCNC: 1.38 NG/ML
PROT SERPL-MCNC: 7.1 G/DL (ref 6.4–8.9)
RBC # BLD AUTO: 3.19 10E6/UL (ref 3.8–5.2)
RBC # BLD AUTO: 3.24 10E6/UL (ref 3.8–5.2)
RBC URINE: 136 /HPF
RSV RNA SPEC NAA+PROBE: NEGATIVE
SARS-COV-2 RNA RESP QL NAA+PROBE: NEGATIVE
SODIUM SERPL-SCNC: 138 MMOL/L (ref 134–144)
SODIUM SERPL-SCNC: 138 MMOL/L (ref 134–144)
SODIUM SERPL-SCNC: 141 MMOL/L (ref 134–144)
SODIUM SERPL-SCNC: 143 MMOL/L (ref 134–144)
SP GR UR STRIP: 1.01 (ref 1–1.03)
TROPONIN I SERPL-MCNC: 748.3 PG/ML (ref 0–34)
TROPONIN I SERPL-MCNC: 923.3 PG/ML (ref 0–34)
UROBILINOGEN UR STRIP-MCNC: NORMAL MG/DL
WBC # BLD AUTO: 7.8 10E3/UL (ref 4–11)
WBC # BLD AUTO: 9.4 10E3/UL (ref 4–11)
WBC CLUMPS #/AREA URNS HPF: PRESENT /HPF
WBC URINE: >182 /HPF

## 2021-01-01 PROCEDURE — 99285 EMERGENCY DEPT VISIT HI MDM: CPT | Performed by: PHYSICIAN ASSISTANT

## 2021-01-01 PROCEDURE — 97535 SELF CARE MNGMENT TRAINING: CPT | Mod: GO | Performed by: OCCUPATIONAL THERAPIST

## 2021-01-01 PROCEDURE — 250N000013 HC RX MED GY IP 250 OP 250 PS 637: Performed by: PHYSICIAN ASSISTANT

## 2021-01-01 PROCEDURE — 71250 CT THORAX DX C-: CPT

## 2021-01-01 PROCEDURE — 250N000013 HC RX MED GY IP 250 OP 250 PS 637: Performed by: STUDENT IN AN ORGANIZED HEALTH CARE EDUCATION/TRAINING PROGRAM

## 2021-01-01 PROCEDURE — 85652 RBC SED RATE AUTOMATED: CPT | Performed by: PHYSICIAN ASSISTANT

## 2021-01-01 PROCEDURE — 84145 PROCALCITONIN (PCT): CPT | Performed by: PHYSICIAN ASSISTANT

## 2021-01-01 PROCEDURE — 83735 ASSAY OF MAGNESIUM: CPT | Performed by: INTERNAL MEDICINE

## 2021-01-01 PROCEDURE — 86140 C-REACTIVE PROTEIN: CPT | Performed by: PHYSICIAN ASSISTANT

## 2021-01-01 PROCEDURE — 36415 COLL VENOUS BLD VENIPUNCTURE: CPT | Performed by: INTERNAL MEDICINE

## 2021-01-01 PROCEDURE — 80048 BASIC METABOLIC PNL TOTAL CA: CPT | Performed by: STUDENT IN AN ORGANIZED HEALTH CARE EDUCATION/TRAINING PROGRAM

## 2021-01-01 PROCEDURE — 99285 EMERGENCY DEPT VISIT HI MDM: CPT | Mod: 25 | Performed by: PHYSICIAN ASSISTANT

## 2021-01-01 PROCEDURE — 97161 PT EVAL LOW COMPLEX 20 MIN: CPT | Mod: GP

## 2021-01-01 PROCEDURE — 96367 TX/PROPH/DG ADDL SEQ IV INF: CPT | Performed by: PHYSICIAN ASSISTANT

## 2021-01-01 PROCEDURE — 87040 BLOOD CULTURE FOR BACTERIA: CPT | Performed by: PHYSICIAN ASSISTANT

## 2021-01-01 PROCEDURE — 93005 ELECTROCARDIOGRAM TRACING: CPT | Performed by: PHYSICIAN ASSISTANT

## 2021-01-01 PROCEDURE — 83735 ASSAY OF MAGNESIUM: CPT | Performed by: PHYSICIAN ASSISTANT

## 2021-01-01 PROCEDURE — 97530 THERAPEUTIC ACTIVITIES: CPT | Mod: GP

## 2021-01-01 PROCEDURE — 36415 COLL VENOUS BLD VENIPUNCTURE: CPT | Performed by: STUDENT IN AN ORGANIZED HEALTH CARE EDUCATION/TRAINING PROGRAM

## 2021-01-01 PROCEDURE — 99239 HOSP IP/OBS DSCHRG MGMT >30: CPT | Performed by: STUDENT IN AN ORGANIZED HEALTH CARE EDUCATION/TRAINING PROGRAM

## 2021-01-01 PROCEDURE — 250N000013 HC RX MED GY IP 250 OP 250 PS 637: Performed by: INTERNAL MEDICINE

## 2021-01-01 PROCEDURE — 120N000001 HC R&B MED SURG/OB

## 2021-01-01 PROCEDURE — 85018 HEMOGLOBIN: CPT | Performed by: STUDENT IN AN ORGANIZED HEALTH CARE EDUCATION/TRAINING PROGRAM

## 2021-01-01 PROCEDURE — 250N000011 HC RX IP 250 OP 636: Performed by: PHYSICIAN ASSISTANT

## 2021-01-01 PROCEDURE — 82310 ASSAY OF CALCIUM: CPT | Performed by: INTERNAL MEDICINE

## 2021-01-01 PROCEDURE — 99223 1ST HOSP IP/OBS HIGH 75: CPT | Performed by: STUDENT IN AN ORGANIZED HEALTH CARE EDUCATION/TRAINING PROGRAM

## 2021-01-01 PROCEDURE — 97116 GAIT TRAINING THERAPY: CPT | Mod: GP

## 2021-01-01 PROCEDURE — 87186 SC STD MICRODIL/AGAR DIL: CPT | Performed by: PHYSICIAN ASSISTANT

## 2021-01-01 PROCEDURE — 258N000003 HC RX IP 258 OP 636: Performed by: PHYSICIAN ASSISTANT

## 2021-01-01 PROCEDURE — 97530 THERAPEUTIC ACTIVITIES: CPT | Mod: GO | Performed by: OCCUPATIONAL THERAPIST

## 2021-01-01 PROCEDURE — 84484 ASSAY OF TROPONIN QUANT: CPT | Mod: 91 | Performed by: PHYSICIAN ASSISTANT

## 2021-01-01 PROCEDURE — 87637 SARSCOV2&INF A&B&RSV AMP PRB: CPT | Performed by: PHYSICIAN ASSISTANT

## 2021-01-01 PROCEDURE — 83880 ASSAY OF NATRIURETIC PEPTIDE: CPT | Performed by: PHYSICIAN ASSISTANT

## 2021-01-01 PROCEDURE — 85027 COMPLETE CBC AUTOMATED: CPT | Performed by: INTERNAL MEDICINE

## 2021-01-01 PROCEDURE — C9803 HOPD COVID-19 SPEC COLLECT: HCPCS | Performed by: PHYSICIAN ASSISTANT

## 2021-01-01 PROCEDURE — 83735 ASSAY OF MAGNESIUM: CPT | Performed by: STUDENT IN AN ORGANIZED HEALTH CARE EDUCATION/TRAINING PROGRAM

## 2021-01-01 PROCEDURE — 82550 ASSAY OF CK (CPK): CPT | Performed by: PHYSICIAN ASSISTANT

## 2021-01-01 PROCEDURE — 96365 THER/PROPH/DIAG IV INF INIT: CPT | Performed by: PHYSICIAN ASSISTANT

## 2021-01-01 PROCEDURE — 36415 COLL VENOUS BLD VENIPUNCTURE: CPT | Performed by: PHYSICIAN ASSISTANT

## 2021-01-01 PROCEDURE — 81001 URINALYSIS AUTO W/SCOPE: CPT | Performed by: PHYSICIAN ASSISTANT

## 2021-01-01 PROCEDURE — 83605 ASSAY OF LACTIC ACID: CPT | Performed by: PHYSICIAN ASSISTANT

## 2021-01-01 PROCEDURE — 82550 ASSAY OF CK (CPK): CPT | Performed by: STUDENT IN AN ORGANIZED HEALTH CARE EDUCATION/TRAINING PROGRAM

## 2021-01-01 PROCEDURE — 97165 OT EVAL LOW COMPLEX 30 MIN: CPT | Mod: GO | Performed by: OCCUPATIONAL THERAPIST

## 2021-01-01 PROCEDURE — 93010 ELECTROCARDIOGRAM REPORT: CPT | Performed by: INTERNAL MEDICINE

## 2021-01-01 PROCEDURE — 82550 ASSAY OF CK (CPK): CPT | Performed by: INTERNAL MEDICINE

## 2021-01-01 PROCEDURE — 80053 COMPREHEN METABOLIC PANEL: CPT | Performed by: PHYSICIAN ASSISTANT

## 2021-01-01 PROCEDURE — 85025 COMPLETE CBC W/AUTO DIFF WBC: CPT | Performed by: PHYSICIAN ASSISTANT

## 2021-01-01 PROCEDURE — 99233 SBSQ HOSP IP/OBS HIGH 50: CPT | Performed by: STUDENT IN AN ORGANIZED HEALTH CARE EDUCATION/TRAINING PROGRAM

## 2021-01-01 RX ORDER — ONDANSETRON 2 MG/ML
4 INJECTION INTRAMUSCULAR; INTRAVENOUS EVERY 6 HOURS PRN
Status: DISCONTINUED | OUTPATIENT
Start: 2021-01-01 | End: 2021-01-01 | Stop reason: HOSPADM

## 2021-01-01 RX ORDER — CEFTRIAXONE SODIUM 1 G/50ML
1 INJECTION, SOLUTION INTRAVENOUS EVERY 24 HOURS
Status: DISCONTINUED | OUTPATIENT
Start: 2021-01-01 | End: 2021-01-01

## 2021-01-01 RX ORDER — ONDANSETRON 4 MG/1
4 TABLET, ORALLY DISINTEGRATING ORAL EVERY 6 HOURS PRN
Status: DISCONTINUED | OUTPATIENT
Start: 2021-01-01 | End: 2021-01-01 | Stop reason: HOSPADM

## 2021-01-01 RX ORDER — MAGNESIUM OXIDE 400 MG/1
400 TABLET ORAL DAILY
Status: DISCONTINUED | OUTPATIENT
Start: 2021-01-01 | End: 2021-01-01

## 2021-01-01 RX ORDER — ASPIRIN 81 MG/1
81 TABLET ORAL DAILY
Status: DISCONTINUED | OUTPATIENT
Start: 2021-01-01 | End: 2021-01-01 | Stop reason: HOSPADM

## 2021-01-01 RX ORDER — AMOXICILLIN AND CLAVULANATE POTASSIUM 500; 125 MG/1; MG/1
1 TABLET, FILM COATED ORAL EVERY 12 HOURS SCHEDULED
Status: DISCONTINUED | OUTPATIENT
Start: 2021-01-01 | End: 2021-01-01 | Stop reason: HOSPADM

## 2021-01-01 RX ORDER — NYSTATIN AND TRIAMCINOLONE ACETONIDE 100000; 1 [USP'U]/G; MG/G
OINTMENT TOPICAL 2 TIMES DAILY
Qty: 15 G | Refills: 1 | Status: SHIPPED | OUTPATIENT
Start: 2021-01-01 | End: 2022-01-01

## 2021-01-01 RX ORDER — PANTOPRAZOLE SODIUM 40 MG/1
40 TABLET, DELAYED RELEASE ORAL
Status: DISCONTINUED | OUTPATIENT
Start: 2021-01-01 | End: 2021-01-01 | Stop reason: HOSPADM

## 2021-01-01 RX ORDER — NYSTATIN 100000 [USP'U]/G
POWDER TOPICAL 3 TIMES DAILY
Qty: 45 G | Refills: 1 | Status: SHIPPED | OUTPATIENT
Start: 2021-01-01 | End: 2022-01-01

## 2021-01-01 RX ORDER — PRAZOSIN HYDROCHLORIDE 5 MG/1
CAPSULE ORAL
Status: ON HOLD | COMMUNITY
End: 2021-01-01

## 2021-01-01 RX ORDER — SODIUM CHLORIDE 9 MG/ML
INJECTION, SOLUTION INTRAVENOUS CONTINUOUS
Status: DISCONTINUED | OUTPATIENT
Start: 2021-01-01 | End: 2021-01-01

## 2021-01-01 RX ORDER — LIDOCAINE 40 MG/G
CREAM TOPICAL
Status: DISCONTINUED | OUTPATIENT
Start: 2021-01-01 | End: 2021-01-01 | Stop reason: HOSPADM

## 2021-01-01 RX ORDER — MAGNESIUM OXIDE 400 MG/1
400 TABLET ORAL 2 TIMES DAILY
Status: DISCONTINUED | OUTPATIENT
Start: 2021-01-01 | End: 2021-01-01 | Stop reason: HOSPADM

## 2021-01-01 RX ORDER — MAGNESIUM OXIDE 400 MG/1
400 TABLET ORAL 2 TIMES DAILY
Qty: 30 TABLET | Refills: 0 | Status: SHIPPED | OUTPATIENT
Start: 2021-01-01 | End: 2022-01-01 | Stop reason: DRUGHIGH

## 2021-01-01 RX ORDER — DILTIAZEM HYDROCHLORIDE 120 MG/1
240 CAPSULE, COATED, EXTENDED RELEASE ORAL DAILY
Status: DISCONTINUED | OUTPATIENT
Start: 2021-01-01 | End: 2021-01-01 | Stop reason: HOSPADM

## 2021-01-01 RX ORDER — AMOXICILLIN AND CLAVULANATE POTASSIUM 500; 125 MG/1; MG/1
1 TABLET, FILM COATED ORAL EVERY 12 HOURS
Qty: 11 TABLET | Refills: 0 | Status: SHIPPED | OUTPATIENT
Start: 2021-01-01 | End: 2022-01-01

## 2021-01-01 RX ORDER — METOPROLOL SUCCINATE 100 MG/1
200 TABLET, EXTENDED RELEASE ORAL DAILY
Status: DISCONTINUED | OUTPATIENT
Start: 2021-01-01 | End: 2021-01-01 | Stop reason: HOSPADM

## 2021-01-01 RX ORDER — NYSTATIN AND TRIAMCINOLONE ACETONIDE 100000; 1 [USP'U]/G; MG/G
OINTMENT TOPICAL 2 TIMES DAILY
Status: DISCONTINUED | OUTPATIENT
Start: 2021-01-01 | End: 2021-01-01

## 2021-01-01 RX ORDER — NYSTATIN 100000 [USP'U]/G
POWDER TOPICAL 3 TIMES DAILY
Status: DISCONTINUED | OUTPATIENT
Start: 2021-01-01 | End: 2021-01-01 | Stop reason: HOSPADM

## 2021-01-01 RX ORDER — PROCHLORPERAZINE MALEATE 5 MG
5 TABLET ORAL EVERY 6 HOURS PRN
Status: DISCONTINUED | OUTPATIENT
Start: 2021-01-01 | End: 2021-01-01 | Stop reason: HOSPADM

## 2021-01-01 RX ORDER — ACETAMINOPHEN 650 MG/1
650 SUPPOSITORY RECTAL EVERY 6 HOURS PRN
Status: DISCONTINUED | OUTPATIENT
Start: 2021-01-01 | End: 2021-01-01 | Stop reason: HOSPADM

## 2021-01-01 RX ORDER — PROCHLORPERAZINE 25 MG
12.5 SUPPOSITORY, RECTAL RECTAL EVERY 12 HOURS PRN
Status: DISCONTINUED | OUTPATIENT
Start: 2021-01-01 | End: 2021-01-01 | Stop reason: HOSPADM

## 2021-01-01 RX ORDER — LIDOCAINE 40 MG/G
CREAM TOPICAL
Status: DISCONTINUED | OUTPATIENT
Start: 2021-01-01 | End: 2021-01-01

## 2021-01-01 RX ORDER — NYSTATIN AND TRIAMCINOLONE ACETONIDE 100000; 1 [USP'U]/G; MG/G
OINTMENT TOPICAL 2 TIMES DAILY
Status: DISCONTINUED | OUTPATIENT
Start: 2021-01-01 | End: 2021-01-01 | Stop reason: HOSPADM

## 2021-01-01 RX ORDER — ACETAMINOPHEN 325 MG/1
650 TABLET ORAL EVERY 6 HOURS PRN
Status: DISCONTINUED | OUTPATIENT
Start: 2021-01-01 | End: 2021-01-01 | Stop reason: HOSPADM

## 2021-01-01 RX ORDER — MAGNESIUM SULFATE HEPTAHYDRATE 40 MG/ML
2 INJECTION, SOLUTION INTRAVENOUS ONCE
Status: COMPLETED | OUTPATIENT
Start: 2021-01-01 | End: 2021-01-01

## 2021-01-01 RX ORDER — FLUCONAZOLE 150 MG/1
150 TABLET ORAL ONCE
Status: COMPLETED | OUTPATIENT
Start: 2021-01-01 | End: 2021-01-01

## 2021-01-01 RX ADMIN — ASPIRIN 81 MG: 81 TABLET, COATED ORAL at 10:28

## 2021-01-01 RX ADMIN — NYSTATIN: 100000 POWDER TOPICAL at 17:02

## 2021-01-01 RX ADMIN — NYSTATIN AND TRIAMCINOLONE ACETONIDE: 100000; 1 OINTMENT TOPICAL at 10:22

## 2021-01-01 RX ADMIN — CEFTRIAXONE SODIUM 1 G: 1 INJECTION, SOLUTION INTRAVENOUS at 19:56

## 2021-01-01 RX ADMIN — ASPIRIN 81 MG: 81 TABLET, COATED ORAL at 09:00

## 2021-01-01 RX ADMIN — NYSTATIN: 100000 POWDER TOPICAL at 03:15

## 2021-01-01 RX ADMIN — SODIUM CHLORIDE: 9 INJECTION, SOLUTION INTRAVENOUS at 22:31

## 2021-01-01 RX ADMIN — SODIUM CHLORIDE: 9 INJECTION, SOLUTION INTRAVENOUS at 00:56

## 2021-01-01 RX ADMIN — NYSTATIN: 100000 POWDER TOPICAL at 09:37

## 2021-01-01 RX ADMIN — METOPROLOL SUCCINATE 200 MG: 100 TABLET, EXTENDED RELEASE ORAL at 08:25

## 2021-01-01 RX ADMIN — SODIUM CHLORIDE: 9 INJECTION, SOLUTION INTRAVENOUS at 10:58

## 2021-01-01 RX ADMIN — AMOXICILLIN AND CLAVULANATE POTASSIUM 1 TABLET: 500; 125 TABLET, FILM COATED ORAL at 20:24

## 2021-01-01 RX ADMIN — DILTIAZEM HYDROCHLORIDE 120 MG: 120 CAPSULE, COATED, EXTENDED RELEASE ORAL at 12:05

## 2021-01-01 RX ADMIN — NYSTATIN: 100000 POWDER TOPICAL at 20:57

## 2021-01-01 RX ADMIN — NYSTATIN AND TRIAMCINOLONE ACETONIDE: 100000; 1 OINTMENT TOPICAL at 09:09

## 2021-01-01 RX ADMIN — PANTOPRAZOLE SODIUM 40 MG: 40 TABLET, DELAYED RELEASE ORAL at 08:59

## 2021-01-01 RX ADMIN — NYSTATIN AND TRIAMCINOLONE ACETONIDE: 100000; 1 OINTMENT TOPICAL at 20:57

## 2021-01-01 RX ADMIN — METOPROLOL SUCCINATE 200 MG: 100 TABLET, EXTENDED RELEASE ORAL at 09:00

## 2021-01-01 RX ADMIN — MAGNESIUM SULFATE HEPTAHYDRATE 2 G: 40 INJECTION, SOLUTION INTRAVENOUS at 21:31

## 2021-01-01 RX ADMIN — DILTIAZEM HYDROCHLORIDE 240 MG: 120 CAPSULE, COATED, EXTENDED RELEASE ORAL at 10:27

## 2021-01-01 RX ADMIN — PANTOPRAZOLE SODIUM 40 MG: 40 TABLET, DELAYED RELEASE ORAL at 08:53

## 2021-01-01 RX ADMIN — SODIUM CHLORIDE: 9 INJECTION, SOLUTION INTRAVENOUS at 20:17

## 2021-01-01 RX ADMIN — Medication 400 MG: at 09:03

## 2021-01-01 RX ADMIN — AMOXICILLIN AND CLAVULANATE POTASSIUM 1 TABLET: 500; 125 TABLET, FILM COATED ORAL at 10:27

## 2021-01-01 RX ADMIN — DILTIAZEM HYDROCHLORIDE 240 MG: 120 CAPSULE, COATED, EXTENDED RELEASE ORAL at 09:02

## 2021-01-01 RX ADMIN — ACETAMINOPHEN 650 MG: 325 TABLET, FILM COATED ORAL at 15:25

## 2021-01-01 RX ADMIN — METOPROLOL SUCCINATE 200 MG: 100 TABLET, EXTENDED RELEASE ORAL at 10:27

## 2021-01-01 RX ADMIN — AMOXICILLIN AND CLAVULANATE POTASSIUM 1 TABLET: 500; 125 TABLET, FILM COATED ORAL at 09:00

## 2021-01-01 RX ADMIN — ACETAMINOPHEN 650 MG: 325 TABLET, FILM COATED ORAL at 14:31

## 2021-01-01 RX ADMIN — CEFTRIAXONE SODIUM 1 G: 1 INJECTION, SOLUTION INTRAVENOUS at 20:19

## 2021-01-01 RX ADMIN — DILTIAZEM HYDROCHLORIDE 240 MG: 120 CAPSULE, COATED, EXTENDED RELEASE ORAL at 08:23

## 2021-01-01 RX ADMIN — NYSTATIN: 100000 POWDER TOPICAL at 14:30

## 2021-01-01 RX ADMIN — Medication 400 MG: at 12:29

## 2021-01-01 RX ADMIN — NYSTATIN AND TRIAMCINOLONE ACETONIDE: 100000; 1 OINTMENT TOPICAL at 03:16

## 2021-01-01 RX ADMIN — FLUCONAZOLE 150 MG: 150 TABLET ORAL at 18:16

## 2021-01-01 RX ADMIN — NYSTATIN: 100000 POWDER TOPICAL at 09:10

## 2021-01-01 ASSESSMENT — ACTIVITIES OF DAILY LIVING (ADL)
ADLS_ACUITY_SCORE: 17
TOILETING_ISSUES: YES
ADLS_ACUITY_SCORE: 16
COMMUNICATION: DIFFICULTY UNDERSTANDING
ADLS_ACUITY_SCORE: 22
ADLS_ACUITY_SCORE: 25
ADLS_ACUITY_SCORE: 22
ADLS_ACUITY_SCORE: 25
ADLS_ACUITY_SCORE: 22
ADLS_ACUITY_SCORE: 22
ADLS_ACUITY_SCORE: 24
ADLS_ACUITY_SCORE: 26
ADLS_ACUITY_SCORE: 22
ADLS_ACUITY_SCORE: 26
ADLS_ACUITY_SCORE: 24
ADLS_ACUITY_SCORE: 22
ADLS_ACUITY_SCORE: 24
DIFFICULTY_EATING/SWALLOWING: NO
ADLS_ACUITY_SCORE: 22
ADLS_ACUITY_SCORE: 22
ADLS_ACUITY_SCORE: 25
ADLS_ACUITY_SCORE: 24
ADLS_ACUITY_SCORE: 22
ADLS_ACUITY_SCORE: 25
ADLS_ACUITY_SCORE: 22
ADLS_ACUITY_SCORE: 22
ADLS_ACUITY_SCORE: 12
ADLS_ACUITY_SCORE: 22
ADLS_ACUITY_SCORE: 22
DIFFICULTY_COMMUNICATING: YES
ADLS_ACUITY_SCORE: 22
ADLS_ACUITY_SCORE: 24
ADLS_ACUITY_SCORE: 24
ADLS_ACUITY_SCORE: 26
ADLS_ACUITY_SCORE: 26
ADLS_ACUITY_SCORE: 17
ADLS_ACUITY_SCORE: 24
ADLS_ACUITY_SCORE: 12
ADLS_ACUITY_SCORE: 24
ADLS_ACUITY_SCORE: 22
ADLS_ACUITY_SCORE: 24
ADLS_ACUITY_SCORE: 12
ADLS_ACUITY_SCORE: 22
TOILETING_ASSISTANCE: TOILETING DIFFICULTY, ASSISTANCE 1 PERSON
ADLS_ACUITY_SCORE: 22
WALKING_OR_CLIMBING_STAIRS_DIFFICULTY: YES
ADLS_ACUITY_SCORE: 25
ADLS_ACUITY_SCORE: 22
ADLS_ACUITY_SCORE: 24
ADLS_ACUITY_SCORE: 22
ADLS_ACUITY_SCORE: 24
ADLS_ACUITY_SCORE: 24
ADLS_ACUITY_SCORE: 22
ADLS_ACUITY_SCORE: 12
ADLS_ACUITY_SCORE: 22
ADLS_ACUITY_SCORE: 22
ADLS_ACUITY_SCORE: 25
ADLS_ACUITY_SCORE: 22
ADLS_ACUITY_SCORE: 24
ADLS_ACUITY_SCORE: 22
ADLS_ACUITY_SCORE: 16
ADLS_ACUITY_SCORE: 24
ADLS_ACUITY_SCORE: 25
ADLS_ACUITY_SCORE: 22
DRESSING/BATHING_DIFFICULTY: YES
WALKING_OR_CLIMBING_STAIRS: AMBULATION DIFFICULTY, REQUIRES EQUIPMENT

## 2021-01-01 ASSESSMENT — MIFFLIN-ST. JEOR
SCORE: 1113.96
SCORE: 1125.3
SCORE: 1142.08

## 2021-01-01 ASSESSMENT — ENCOUNTER SYMPTOMS
STRIDOR: 0
WEAKNESS: 1
CONSTIPATION: 0
SORE THROAT: 0
FATIGUE: 1
BACK PAIN: 0
DIARRHEA: 0
FEVER: 0
VOMITING: 0
NAUSEA: 0
FLANK PAIN: 0
CHEST TIGHTNESS: 0
EYE PAIN: 0
SEIZURES: 0
FACIAL SWELLING: 0
ABDOMINAL PAIN: 0
TREMORS: 0
AGITATION: 0
SHORTNESS OF BREATH: 0
DIZZINESS: 0
NECK PAIN: 0

## 2021-01-25 ENCOUNTER — TELEPHONE (OUTPATIENT)
Dept: INTERNAL MEDICINE | Facility: OTHER | Age: 86
End: 2021-01-25

## 2021-01-25 NOTE — TELEPHONE ENCOUNTER
After verifying name and birth date, patient was notified of provider's response.   Janina Barrera([facility])........1/25/2021  3:29 PM

## 2021-01-25 NOTE — TELEPHONE ENCOUNTER
Patient would like to talk to someone to see if she should get the COVID vaccine or not when it is available to the public, I did tell her there are none avail for the public as of now, she still would like to speak with someone about if she should get one or not.  Thank you!    Deisy Harris on 1/25/2021 at 8:26 AM

## 2021-02-16 ENCOUNTER — IMMUNIZATION (OUTPATIENT)
Dept: FAMILY MEDICINE | Facility: OTHER | Age: 86
End: 2021-02-16
Attending: INTERNAL MEDICINE
Payer: MEDICARE

## 2021-02-16 PROCEDURE — 91300 PR COVID VAC PFIZER DIL RECON 30 MCG/0.3 ML IM: CPT

## 2021-02-23 ENCOUNTER — TELEPHONE (OUTPATIENT)
Dept: INTERNAL MEDICINE | Facility: OTHER | Age: 86
End: 2021-02-23

## 2021-02-23 NOTE — TELEPHONE ENCOUNTER
Daughter called and is looking for a referral or hearing aides. Please call    Anel Ochoa on 2/23/2021 at 2:59 PM

## 2021-02-24 NOTE — TELEPHONE ENCOUNTER
Called and they do not wish to have a referral at this time as her insurance won't pay for hearing aids. Lisa Bullock LPN ....................2/24/2021  8:34 AM

## 2021-03-09 ENCOUNTER — IMMUNIZATION (OUTPATIENT)
Dept: FAMILY MEDICINE | Facility: OTHER | Age: 86
End: 2021-03-09
Attending: FAMILY MEDICINE
Payer: MEDICARE

## 2021-03-09 PROCEDURE — 91300 PR COVID VAC PFIZER DIL RECON 30 MCG/0.3 ML IM: CPT

## 2021-05-27 DIAGNOSIS — D50.9 IRON DEFICIENCY ANEMIA, UNSPECIFIED IRON DEFICIENCY ANEMIA TYPE: ICD-10-CM

## 2021-05-27 DIAGNOSIS — I10 BENIGN ESSENTIAL HYPERTENSION: ICD-10-CM

## 2021-05-27 RX ORDER — METOPROLOL SUCCINATE 200 MG/1
TABLET, EXTENDED RELEASE ORAL
Qty: 90 TABLET | Refills: 0 | Status: SHIPPED | OUTPATIENT
Start: 2021-05-27 | End: 2021-06-08

## 2021-05-27 RX ORDER — FERROUS SULFATE 325(65) MG
TABLET ORAL
Qty: 100 TABLET | Refills: 0 | Status: SHIPPED | OUTPATIENT
Start: 2021-05-27 | End: 2021-06-08

## 2021-05-27 NOTE — LETTER
May 27, 2021      Lety Luna  [address] [address]        Dear Lety,         A refill of FEROSUL 325 (65 Fe) MG tablet and metoprolol succinate ER (TOPROL-XL) 200 MG 24 hr tablet have been requested by your pharmacy.  We noticed that it has been greater than 12 months since your last comprehensive visit and labs with Rodney Nelson MD.  A limited 90 day supply has been sent to your pharmacy at this time.    Additional refills require a medication management appointment.  Your health is very important to us.  Please call the clinic at [phone] to schedule your appointment.    Thank you,    The Refill Nurse  [facility]                  unresponsive

## 2021-05-27 NOTE — TELEPHONE ENCOUNTER
"[facility] sent Rx request for the following:   FEROSUL 325 (65 Fe) MG tablet  Sig: TAKE 1 TABLET(325 MG) BY MOUTH DAILY WITH BREAKFAST    Last Prescription Date:   06/30/2020  Last Fill Qty/Refills:         100, R-3    Iron Supplements Passed - 5/27/2021  5:22 AM       Passed - Patient is 12 years of age or older       Passed - Recent (12 mo) or future (30 days) visit within the authorizing provider's specialty    Patient has had an office visit with the authorizing provider or a provider within the authorizing providers department within the previous 12 mos or has a future within next 30 days. See \"Patient Info\" tab in inbasket, or \"Choose Columns\" in Meds & Orders section of the refill encounter.             Passed - Hgb OR Hct on record within the past 12 mos.    Patient need only have had a HGB or HCT on file in the past 12 mos. That result does not need to be normal.    Recent Labs   Lab Test 06/30/20  0829 10/16/19  0912 03/22/19  0929   HGB 11.3* 11.5* 11.8     Recent Labs   Lab Test 06/30/20  0829 03/22/19  0929 03/13/18  0826   HCT 35.1 38.0 35.9       Please verify a HGB or HCT has been checked SINCE THE LAST DOSE CHANGE.           Passed - Medication is active on med list     metoprolol succinate ER (TOPROL-XL) 200 MG 24 hr tablet   Sig: TAKE 1 TABLET(200 MG) BY MOUTH DAILY    Last Prescription Date:   06/30/2020  Last Fill Qty/Refills:         90, R-3    Beta-Blockers Protocol Passed - 5/27/2021  5:22 AM       Passed - Blood pressure under 140/90 in past 12 months    BP Readings from Last 3 Encounters:   06/30/20 130/70   11/26/19 120/84   11/05/19 108/76                Passed - Patient is age 6 or older       Passed - Recent (12 mo) or future (30 days) visit within the authorizing provider's specialty    Patient has had an office visit with the authorizing provider or a provider within the authorizing providers department within the previous 12 mos or has a future within next 30 days. See " "\"Patient Info\" tab in inbasket, or \"Choose Columns\" in Meds & Orders section of the refill encounter.             Passed - Medication is active on med list   Warnings Override History for metoprolol succinate ER (TOPROL-XL) 200 MG 24 hr tablet [[id number]]    Overridden by Rodney Nelson MD on Jun 30, 2020 8:22 AM   Drug-Drug   1. QUINAZOLINES / BETA-ADRENERGIC BLOCKERS [Level: Moderate] [Reason: Tolerated medication/side effects in past]   Other Orders: prazosin (MINIPRESS) 5 MG capsule      2. QUINAZOLINES / BETA-ADRENERGIC BLOCKERS [Level: Moderate] [Reason: Tolerated medication/side effects in past]   Other Orders: prazosin (MINIPRESS) 5 MG capsule          Last Office Visit:              06/30/2020 ([facility])   Future Office visit:           None noted    Patient due for annual review. Letter sent. Prescription approved per [facility] Refill Protocol for 90 day lit refills with notation made to requesting pharmacy. Vicki Brown RN ....................  5/27/2021   11:21 AM        "

## 2021-06-08 ENCOUNTER — OFFICE VISIT (OUTPATIENT)
Dept: INTERNAL MEDICINE | Facility: OTHER | Age: 86
End: 2021-06-08
Attending: INTERNAL MEDICINE
Payer: MEDICARE

## 2021-06-08 VITALS
DIASTOLIC BLOOD PRESSURE: 72 MMHG | SYSTOLIC BLOOD PRESSURE: 124 MMHG | BODY MASS INDEX: 32.95 KG/M2 | WEIGHT: 174.4 LBS | HEART RATE: 57 BPM | OXYGEN SATURATION: 97 % | RESPIRATION RATE: 20 BRPM

## 2021-06-08 DIAGNOSIS — Z87.19 H/O: GI BLEED: ICD-10-CM

## 2021-06-08 DIAGNOSIS — N18.31 STAGE 3A CHRONIC KIDNEY DISEASE (H): ICD-10-CM

## 2021-06-08 DIAGNOSIS — I10 BENIGN ESSENTIAL HYPERTENSION: ICD-10-CM

## 2021-06-08 DIAGNOSIS — E11.9 TYPE 2 DIABETES MELLITUS WITHOUT COMPLICATION, WITHOUT LONG-TERM CURRENT USE OF INSULIN (H): ICD-10-CM

## 2021-06-08 DIAGNOSIS — I48.20 CHRONIC ATRIAL FIBRILLATION (H): ICD-10-CM

## 2021-06-08 DIAGNOSIS — E78.1 HYPERTRIGLYCERIDEMIA: ICD-10-CM

## 2021-06-08 DIAGNOSIS — R60.0 PERIPHERAL EDEMA: ICD-10-CM

## 2021-06-08 DIAGNOSIS — I10 ESSENTIAL HYPERTENSION: Primary | ICD-10-CM

## 2021-06-08 DIAGNOSIS — K92.2 GASTROINTESTINAL HEMORRHAGE, UNSPECIFIED GASTROINTESTINAL HEMORRHAGE TYPE: ICD-10-CM

## 2021-06-08 DIAGNOSIS — I42.9 CARDIOMYOPATHY, UNSPECIFIED TYPE (H): ICD-10-CM

## 2021-06-08 DIAGNOSIS — D50.9 IRON DEFICIENCY ANEMIA, UNSPECIFIED IRON DEFICIENCY ANEMIA TYPE: ICD-10-CM

## 2021-06-08 DIAGNOSIS — D75.89 MACROCYTOSIS: ICD-10-CM

## 2021-06-08 LAB
ALBUMIN SERPL-MCNC: 3.8 G/DL (ref 3.5–5.7)
ALP SERPL-CCNC: 49 U/L (ref 34–104)
ALT SERPL W P-5'-P-CCNC: 6 U/L (ref 7–52)
ANION GAP SERPL CALCULATED.3IONS-SCNC: 8 MMOL/L (ref 3–14)
AST SERPL W P-5'-P-CCNC: 10 U/L (ref 13–39)
BILIRUB SERPL-MCNC: 0.5 MG/DL (ref 0.3–1)
BUN SERPL-MCNC: 29 MG/DL (ref 7–25)
CALCIUM SERPL-MCNC: 8.7 MG/DL (ref 8.6–10.3)
CHLORIDE SERPL-SCNC: 108 MMOL/L (ref 98–107)
CHOLEST SERPL-MCNC: 112 MG/DL
CO2 SERPL-SCNC: 26 MMOL/L (ref 21–31)
CREAT SERPL-MCNC: 1.48 MG/DL (ref 0.6–1.2)
ERYTHROCYTE [DISTWIDTH] IN BLOOD BY AUTOMATED COUNT: 13.5 % (ref 10–15)
FOLATE SERPL-MCNC: 11.6 NG/ML
GFR SERPL CREATININE-BSD FRML MDRD: 33 ML/MIN/{1.73_M2}
GLUCOSE SERPL-MCNC: 124 MG/DL (ref 70–105)
HBA1C MFR BLD: 6.5 % (ref 4–6)
HCT VFR BLD AUTO: 33.7 % (ref 35–47)
HDLC SERPL-MCNC: 31 MG/DL (ref 23–92)
HGB BLD-MCNC: 11 G/DL (ref 11.7–15.7)
LDLC SERPL CALC-MCNC: 54 MG/DL
MCH RBC QN AUTO: 33.6 PG (ref 26.5–33)
MCHC RBC AUTO-ENTMCNC: 32.6 G/DL (ref 31.5–36.5)
MCV RBC AUTO: 103 FL (ref 78–100)
NONHDLC SERPL-MCNC: 81 MG/DL
PLATELET # BLD AUTO: 134 10E9/L (ref 150–450)
POTASSIUM SERPL-SCNC: 5.2 MMOL/L (ref 3.5–5.1)
PROT SERPL-MCNC: 6.9 G/DL (ref 6.4–8.9)
RBC # BLD AUTO: 3.27 10E12/L (ref 3.8–5.2)
SODIUM SERPL-SCNC: 142 MMOL/L (ref 134–144)
TRIGL SERPL-MCNC: 137 MG/DL
VIT B12 SERPL-MCNC: 552 PG/ML (ref 180–914)
WBC # BLD AUTO: 7.1 10E9/L (ref 4–11)

## 2021-06-08 PROCEDURE — 99214 OFFICE O/P EST MOD 30 MIN: CPT | Mod: 25 | Performed by: INTERNAL MEDICINE

## 2021-06-08 PROCEDURE — 85027 COMPLETE CBC AUTOMATED: CPT | Mod: ZL | Performed by: INTERNAL MEDICINE

## 2021-06-08 PROCEDURE — G0439 PPPS, SUBSEQ VISIT: HCPCS | Performed by: INTERNAL MEDICINE

## 2021-06-08 PROCEDURE — 80061 LIPID PANEL: CPT | Mod: ZL | Performed by: INTERNAL MEDICINE

## 2021-06-08 PROCEDURE — 83036 HEMOGLOBIN GLYCOSYLATED A1C: CPT | Mod: ZL | Performed by: INTERNAL MEDICINE

## 2021-06-08 PROCEDURE — 80053 COMPREHEN METABOLIC PANEL: CPT | Mod: ZL | Performed by: INTERNAL MEDICINE

## 2021-06-08 PROCEDURE — 82746 ASSAY OF FOLIC ACID SERUM: CPT | Mod: ZL | Performed by: INTERNAL MEDICINE

## 2021-06-08 PROCEDURE — 36415 COLL VENOUS BLD VENIPUNCTURE: CPT | Mod: ZL | Performed by: INTERNAL MEDICINE

## 2021-06-08 PROCEDURE — G0463 HOSPITAL OUTPT CLINIC VISIT: HCPCS

## 2021-06-08 PROCEDURE — 82607 VITAMIN B-12: CPT | Mod: ZL | Performed by: INTERNAL MEDICINE

## 2021-06-08 RX ORDER — METOPROLOL SUCCINATE 200 MG/1
200 TABLET, EXTENDED RELEASE ORAL DAILY
Qty: 90 TABLET | Refills: 3 | Status: SHIPPED | OUTPATIENT
Start: 2021-06-08 | End: 2022-01-01

## 2021-06-08 RX ORDER — FERROUS SULFATE 325(65) MG
325 TABLET ORAL
Qty: 100 TABLET | Refills: 3 | Status: SHIPPED | OUTPATIENT
Start: 2021-06-08 | End: 2022-01-01

## 2021-06-08 RX ORDER — TRIAMTERENE AND HYDROCHLOROTHIAZIDE 37.5; 25 MG/1; MG/1
1 CAPSULE ORAL DAILY
Qty: 90 CAPSULE | Refills: 3 | Status: ON HOLD | OUTPATIENT
Start: 2021-06-08 | End: 2021-01-01

## 2021-06-08 RX ORDER — PRAZOSIN HYDROCHLORIDE 5 MG/1
CAPSULE ORAL
Qty: 270 CAPSULE | Refills: 3 | Status: ON HOLD | OUTPATIENT
Start: 2021-06-08 | End: 2021-01-01

## 2021-06-08 RX ORDER — DILTIAZEM HYDROCHLORIDE 240 MG/1
240 CAPSULE, COATED, EXTENDED RELEASE ORAL DAILY
Qty: 90 CAPSULE | Refills: 3 | Status: SHIPPED | OUTPATIENT
Start: 2021-06-08 | End: 2022-01-01

## 2021-06-08 RX ORDER — CAPTOPRIL 50 MG/1
50 TABLET ORAL 2 TIMES DAILY
Qty: 180 TABLET | Refills: 3 | Status: SHIPPED | OUTPATIENT
Start: 2021-06-08 | End: 2021-09-08

## 2021-06-08 ASSESSMENT — PAIN SCALES - GENERAL: PAINLEVEL: NO PAIN (0)

## 2021-06-08 NOTE — LETTER
June 8, 2021      Lety Luna  [address] [place] [address]        Dear Lety,    Below are the results of your recent labs:    Results for orders placed or performed in visit on 06/08/21   CBC with platelets     Status: Abnormal   Result Value Ref Range    WBC 7.1 4.0 - 11.0 10e9/L    RBC Count 3.27 (L) 3.8 - 5.2 10e12/L    Hemoglobin 11.0 (L) 11.7 - 15.7 g/dL    Hematocrit 33.7 (L) 35.0 - 47.0 %     (H) 78 - 100 fl    MCH 33.6 (H) 26.5 - 33.0 pg    MCHC 32.6 31.5 - 36.5 g/dL    RDW 13.5 10.0 - 15.0 %    Platelet Count 134 (L) 150 - 450 10e9/L   Hemoglobin A1c     Status: Abnormal   Result Value Ref Range    Hemoglobin A1C 6.5 (H) 4.0 - 6.0 %   Lipid Profile     Status: None   Result Value Ref Range    Cholesterol 112 <200 mg/dL    Triglycerides 137 <150 mg/dL    HDL Cholesterol 31 23 - 92 mg/dL    LDL Cholesterol Calculated 54 <100 mg/dL    Non HDL Cholesterol 81 <130 mg/dL   Comprehensive metabolic panel     Status: Abnormal   Result Value Ref Range    Sodium 142 134 - 144 mmol/L    Potassium 5.2 (H) 3.5 - 5.1 mmol/L    Chloride 108 (H) 98 - 107 mmol/L    Carbon Dioxide 26 21 - 31 mmol/L    Anion Gap 8 3 - 14 mmol/L    Glucose 124 (H) 70 - 105 mg/dL    Urea Nitrogen 29 (H) 7 - 25 mg/dL    Creatinine 1.48 (H) 0.60 - 1.20 mg/dL    GFR Estimate 33 (L) >60 mL/min/[1.73_m2]    GFR Estimate If Black 40 (L) >60 mL/min/[1.73_m2]    Calcium 8.7 8.6 - 10.3 mg/dL    Bilirubin Total 0.5 0.3 - 1.0 mg/dL    Albumin 3.8 3.5 - 5.7 g/dL    Protein Total 6.9 6.4 - 8.9 g/dL    Alkaline Phosphatase 49 34 - 104 U/L    ALT 6 (L) 7 - 52 U/L    AST 10 (L) 13 - 39 U/L   Folic Acid     Status: None   Result Value Ref Range    Folate 11.6 >5.21 ng/mL   Vitamin B12     Status: None   Result Value Ref Range    Vitamin B12 552 180 - 914 pg/mL        Overall, your blood tests look fine.  A number of things are labeled high or low but these have been present in the past and essentially are stable.  I am  satisfied with your results.  Assuming all goes well, I want you to return in 6 months for a recheck.    Sincerely,        Rodney Nelson MD  Internal Medicine  [facility] and [facility]

## 2021-06-08 NOTE — PROGRESS NOTES
SUBJECTIVE:   Lety Luna is a 89 year old female who presents for Preventive Visit.      Patient has been advised of split billing requirements and indicates understanding: Yes   Are you in the first 12 months of your Medicare coverage?  No    HPI     She is in today for a Medicare wellness visit. In most respects she is stable and feeling well. She has a history of diet-controlled diabetes and is due for recheck on that. She has a history of gout that is not problematic as long as she takes the allopurinol. She has treated hypertension and associated chronic kidney disease. Her blood pressure is well controlled, she is due for recheck on her renal function. Her last labs were approximately 1 year ago.    She has a history of chronic atrial fibrillation. She is on rate control strategy without anticoagulation. The reason for no anticoagulation is the fact that she has a chronic GI bleed with chronic anemia. She has declined any GI evaluation which I think is appropriate given her age, etc.    She really has no other major complaints other than troubles with her hearing. Her cognitive decline continues but per daughter has not really deteriorated. She continues to live independently in an apartment and does fairly well with that. 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    Medications are reconciled. Past medical history, past surgical history, family history and social histories are reviewed and updated. Immunizations are essentially up-to-date other than Shingrix vaccine.    Do you feel safe in your environment? Yes    Have you ever done Advance Care Planning? (For example, a Health Directive, POLST, or a discussion with a medical provider or your loved ones about your wishes): No, advance care planning information given to patient to review.  Patient declined advance care planning discussion at this  time.       Fall risk  Fallen 2 or more times in the past year?: No  Any fall with injury in the past year?: No    Cognitive Screening   1) Repeat 3 items (Leader, Season, Table)    2) Clock draw: NORMAL  3) 3 item recall: Recalls 1 object   Results: NORMAL clock, 1-2 items recalled: COGNITIVE IMPAIRMENT LESS LIKELY    Mini-CogTM Copyright JUANIS Cordon. Licensed by the author for use in [facility]; reprinted with permission (willian@[facility]). All rights reserved.      Do you have sleep apnea, excessive snoring or daytime drowsiness?: yes    Reviewed and updated as needed this visit by clinical staff  Tobacco  Allergies  Meds              Reviewed and updated as needed this visit by Provider                Social History     Tobacco Use     Smoking status: Never Smoker     Smokeless tobacco: Never Used   Substance Use Topics     Alcohol use: No         No flowsheet data found.        Current Outpatient Medications   Medication     allopurinol (ZYLOPRIM) 100 MG tablet     aspirin 81 MG tablet     captopril (CAPOTEN) 50 MG tablet     diltiazem ER COATED BEADS (CARTIA XT) 240 MG 24 hr capsule     FEROSUL 325 (65 Fe) MG tablet     furosemide (LASIX) 20 MG tablet     metoprolol succinate ER (TOPROL-XL) 200 MG 24 hr tablet     omeprazole (PRILOSEC) 20 MG DR capsule     order for DME     prazosin (MINIPRESS) 5 MG capsule     triamterene-HCTZ (DYAZIDE) 37.5-25 MG capsule     No current facility-administered medications for this visit.          Current providers sharing in care for this patient include:   Patient Care Team:  Rodney Nelson MD as PCP - General (Internal Medicine)  Rodney Nelson MD as Assigned PCP    The following health maintenance items are reviewed in Epic and correct as of today:  Health Maintenance Due   Topic Date Due     MICROALBUMIN  Never done     ZOSTER IMMUNIZATION (1 of 2) Never done     MEDICARE ANNUAL WELLNESS VISIT  Never done     DIABETIC FOOT EXAM  11/14/2019     LIPID  03/22/2020  "    A1C  12/30/2020     EYE EXAM  03/11/2021     BMP  06/30/2021     FALL RISK ASSESSMENT  06/30/2021     Lab work is in process      Mammogram Screening - Mammography discussed and declined  Pertinent mammograms are reviewed under the imaging tab.    Review of Systems  Constitutional, HEENT, cardiovascular, pulmonary, GI, , musculoskeletal, neuro, skin, endocrine and psych systems are negative, except as otherwise noted.    OBJECTIVE:   /72   Breastfeeding No  Estimated body mass index is 33.67 kg/m  as calculated from the following:    Height as of 10/16/19: 1.549 m (5' 1\").    Weight as of 6/30/20: 80.8 kg (178 lb 3.2 oz).  Physical Exam  GENERAL: healthy, alert and no distress  EYES: Eyes grossly normal to inspection, PERRL and conjunctivae and sclerae normal  HENT: ear canals and TM's normal, nose and mouth without ulcers or lesions  NECK: no adenopathy, no asymmetry, masses, or scars and thyroid normal to palpation  RESP: lungs clear to auscultation - no rales, rhonchi or wheezes  BREAST: normal without masses, tenderness or nipple discharge and no palpable axillary masses or adenopathy  CV: irregular rate and rhythm, normal S1 S2, no S3 or S4, no murmur, click or rub, no peripheral edema and peripheral pulses strong  ABDOMEN: soft, nontender, no hepatosplenomegaly, no masses and bowel sounds normal  MS: no gross musculoskeletal defects noted, no edema  SKIN: no suspicious lesions or rashes  NEURO: Normal strength and tone, mentation intact and speech normal  PSYCH: mentation appears normal, affect normal/bright        ASSESSMENT / PLAN:       ICD-10-CM    1. Essential hypertension  I10 Comprehensive metabolic panel   2. Type 2 diabetes mellitus without complication, without long-term current use of insulin (H)  E11.9 Hemoglobin A1c   3. Stage 3a chronic kidney disease  N18.31    4. Chronic atrial fibrillation (H)  I48.20 CBC with platelets   5. Cardiomyopathy, unspecified type (H)  I42.9    6. " "Hypertriglyceridemia  E78.1 Lipid Profile   7. Peripheral edema  R60.9    8. Gastrointestinal hemorrhage, unspecified gastrointestinal hemorrhage type  K92.2        Patient has been advised of split billing requirements and indicates understanding: Yes  COUNSELING:    Overall she appears to be stable. Medications will continue without change. Complete lab drawn and pending, I will send a letter with the results and any recommendations. Refills are done. No further intervention for her atrial fibrillation or chronic GI bleeding unless these become more of an issue. Encouraged her to continue doing exercise as able. For the time being the daughter feels that she is safe to be at home on her own despite her cognitive issues. Follow-up will be dependent on clinical course as well as the results of her lab.    Reviewed preventive health counseling, as reflected in patient instructions       Regular exercise       Healthy diet/nutrition    Estimated body mass index is 33.67 kg/m  as calculated from the following:    Height as of 10/16/19: 1.549 m (5' 1\").    Weight as of 6/30/20: 80.8 kg (178 lb 3.2 oz).    Weight management plan: Discussed healthy diet and exercise guidelines    She reports that she has never smoked. She has never used smokeless tobacco.      Appropriate preventive services were discussed with this patient, including applicable screening as appropriate for cardiovascular disease, diabetes, osteopenia/osteoporosis, and glaucoma.  As appropriate for age/gender, discussed screening for colorectal cancer, prostate cancer, breast cancer, and cervical cancer. Checklist reviewing preventive services available has been given to the patient.    Reviewed patients plan of care and provided an AVS. The Basic Care Plan (routine screening as documented in Health Maintenance) for Lety meets the Care Plan requirement. This Care Plan has been established and reviewed with the Patient.    Counseling Resources:  ATP IV " Guidelines  Pooled Cohorts Equation Calculator  Breast Cancer Risk Calculator  Breast Cancer: Medication to Reduce Risk  FRAX Risk Assessment  ICSI Preventive Guidelines  Dietary Guidelines for Americans, 2010  USDA's MyPlate  ASA Prophylaxis  Lung CA Screening    ANASTASIIA ARGUETA MD  [facility]    Identified Health Risks:

## 2021-07-05 DIAGNOSIS — I10 ESSENTIAL HYPERTENSION: ICD-10-CM

## 2021-07-06 RX ORDER — TRIAMTERENE AND HYDROCHLOROTHIAZIDE 37.5; 25 MG/1; MG/1
1 CAPSULE ORAL DAILY
Qty: 90 CAPSULE | Refills: 3 | OUTPATIENT
Start: 2021-07-06

## 2021-07-06 NOTE — TELEPHONE ENCOUNTER
Redundant refill request refused: Too soon:    triamterene-HCTZ (DYAZIDE) 37.5-25 MG capsule 90 capsule 3 6/8/2021  No   Sig - Route: Take 1 capsule by mouth daily - Oral   Sent to pharmacy as: Triamterene-HCTZ 37.5-25 MG Oral Capsule (DYAZIDE)   Class: E-Prescribe   Order: [id number]   E-Prescribing Status: Receipt confirmed by pharmacy (6/8/2021  8:46 AM CDT)       [id number] - [place], [place] - [address]     Unable to complete prescription refill per RN Medication Refill Policy. Ellen Monroe RN .............. 7/6/2021  11:24 AM

## 2021-07-09 ENCOUNTER — TELEPHONE (OUTPATIENT)
Dept: INTERNAL MEDICINE | Facility: OTHER | Age: 86
End: 2021-07-09

## 2021-07-09 NOTE — TELEPHONE ENCOUNTER
Please call the patient.  RX  For BP   She did not know the name of it.  She will be out in one day.      Prachi Turpin on 7/9/2021 at 1:45 PM

## 2021-07-09 NOTE — TELEPHONE ENCOUNTER
Called patient twice and she keeps hanging up on me. I was finally able to leave a message to have her call her pharmacy.    Norma J. Gosselin, LPN .......  7/9/2021  1:50 PM

## 2021-09-04 DIAGNOSIS — I10 BENIGN ESSENTIAL HYPERTENSION: ICD-10-CM

## 2021-09-04 DIAGNOSIS — Z87.19 H/O: GI BLEED: ICD-10-CM

## 2021-09-08 RX ORDER — CAPTOPRIL 50 MG/1
TABLET ORAL
Qty: 180 TABLET | Refills: 3 | Status: SHIPPED | OUTPATIENT
Start: 2021-09-08 | End: 2022-01-01

## 2021-09-17 DIAGNOSIS — M1A.9XX0 CHRONIC GOUT WITHOUT TOPHUS, UNSPECIFIED CAUSE, UNSPECIFIED SITE: ICD-10-CM

## 2021-09-21 RX ORDER — ALLOPURINOL 100 MG/1
TABLET ORAL
Qty: 90 TABLET | Refills: 3 | Status: SHIPPED | OUTPATIENT
Start: 2021-09-21 | End: 2022-01-01

## 2021-09-21 NOTE — TELEPHONE ENCOUNTER
ALLOPURINOL 100MG TABLETS       Last Written Prescription Date:  6/30/21  Last Fill Quantity: 90,   # refills: 3  Last Office Visit: 6/8/21  Future Office visit:       Routing refill request to provider for review/approval because:  Drug not on the [facility], [facility] or [facility] refill protocol or controlled substance, due to unable to refill per protocol.  Unable to complete prescription refill per RNMedication Refill Policy.................... Heena Castañeda RN ....................  9/21/2021   10:19 AM

## 2021-10-05 ENCOUNTER — IMMUNIZATION (OUTPATIENT)
Dept: FAMILY MEDICINE | Facility: OTHER | Age: 86
End: 2021-10-05
Attending: FAMILY MEDICINE
Payer: MEDICARE

## 2021-10-05 DIAGNOSIS — Z23 NEED FOR PROPHYLACTIC VACCINATION AND INOCULATION AGAINST INFLUENZA: Primary | ICD-10-CM

## 2021-10-05 PROCEDURE — G0008 ADMIN INFLUENZA VIRUS VAC: HCPCS

## 2021-10-05 PROCEDURE — 90662 IIV NO PRSV INCREASED AG IM: CPT

## 2021-10-05 PROCEDURE — 91300 PR COVID VAC PFIZER DIL RECON 30 MCG/0.3 ML IM: CPT

## 2021-10-05 NOTE — ADDENDUM NOTE
Addended by: KIM GALLO on: 10/5/2021 11:55 AM     Modules accepted: Orders, Level of Service, SmartSet

## 2021-11-10 ENCOUNTER — OFFICE VISIT (OUTPATIENT)
Dept: INTERNAL MEDICINE | Facility: OTHER | Age: 86
End: 2021-11-10
Attending: INTERNAL MEDICINE
Payer: MEDICARE

## 2021-11-10 VITALS
BODY MASS INDEX: 31.96 KG/M2 | SYSTOLIC BLOOD PRESSURE: 118 MMHG | OXYGEN SATURATION: 95 % | DIASTOLIC BLOOD PRESSURE: 60 MMHG | RESPIRATION RATE: 20 BRPM | WEIGHT: 169.13 LBS | TEMPERATURE: 97.3 F | HEART RATE: 84 BPM

## 2021-11-10 DIAGNOSIS — K92.2 GASTROINTESTINAL HEMORRHAGE, UNSPECIFIED GASTROINTESTINAL HEMORRHAGE TYPE: ICD-10-CM

## 2021-11-10 DIAGNOSIS — I42.9 CARDIOMYOPATHY, UNSPECIFIED TYPE (H): ICD-10-CM

## 2021-11-10 DIAGNOSIS — N18.31 STAGE 3A CHRONIC KIDNEY DISEASE (H): ICD-10-CM

## 2021-11-10 DIAGNOSIS — E11.9 TYPE 2 DIABETES MELLITUS WITHOUT COMPLICATION, WITHOUT LONG-TERM CURRENT USE OF INSULIN (H): Primary | ICD-10-CM

## 2021-11-10 LAB
ANION GAP SERPL CALCULATED.3IONS-SCNC: 7 MMOL/L (ref 3–14)
BUN SERPL-MCNC: 33 MG/DL (ref 7–25)
CALCIUM SERPL-MCNC: 8.8 MG/DL (ref 8.6–10.3)
CHLORIDE BLD-SCNC: 105 MMOL/L (ref 98–107)
CO2 SERPL-SCNC: 26 MMOL/L (ref 21–31)
CREAT SERPL-MCNC: 1.62 MG/DL (ref 0.6–1.2)
ERYTHROCYTE [DISTWIDTH] IN BLOOD BY AUTOMATED COUNT: 12.6 % (ref 10–15)
GFR SERPL CREATININE-BSD FRML MDRD: 27 ML/MIN/1.73M2
GLUCOSE BLD-MCNC: 104 MG/DL (ref 70–105)
HBA1C MFR BLD: 6.4 % (ref 4–6.2)
HCT VFR BLD AUTO: 33 % (ref 35–47)
HGB BLD-MCNC: 10.7 G/DL (ref 11.7–15.7)
MCH RBC QN AUTO: 33.2 PG (ref 26.5–33)
MCHC RBC AUTO-ENTMCNC: 32.4 G/DL (ref 31.5–36.5)
MCV RBC AUTO: 103 FL (ref 78–100)
PLATELET # BLD AUTO: 138 10E3/UL (ref 150–450)
POTASSIUM BLD-SCNC: 5.4 MMOL/L (ref 3.5–5.1)
RBC # BLD AUTO: 3.22 10E6/UL (ref 3.8–5.2)
SODIUM SERPL-SCNC: 138 MMOL/L (ref 134–144)
WBC # BLD AUTO: 6 10E3/UL (ref 4–11)

## 2021-11-10 PROCEDURE — 80048 BASIC METABOLIC PNL TOTAL CA: CPT | Mod: ZL | Performed by: INTERNAL MEDICINE

## 2021-11-10 PROCEDURE — G0463 HOSPITAL OUTPT CLINIC VISIT: HCPCS

## 2021-11-10 PROCEDURE — 99214 OFFICE O/P EST MOD 30 MIN: CPT | Performed by: INTERNAL MEDICINE

## 2021-11-10 PROCEDURE — 85027 COMPLETE CBC AUTOMATED: CPT | Mod: ZL | Performed by: INTERNAL MEDICINE

## 2021-11-10 PROCEDURE — 83036 HEMOGLOBIN GLYCOSYLATED A1C: CPT | Mod: ZL | Performed by: INTERNAL MEDICINE

## 2021-11-10 PROCEDURE — 36415 COLL VENOUS BLD VENIPUNCTURE: CPT | Mod: ZL | Performed by: INTERNAL MEDICINE

## 2021-11-10 ASSESSMENT — ENCOUNTER SYMPTOMS
ALLERGIC/IMMUNOLOGIC NEGATIVE: 1
ENDOCRINE NEGATIVE: 1
HEMATOLOGIC/LYMPHATIC NEGATIVE: 1
CONSTITUTIONAL NEGATIVE: 1

## 2021-11-10 ASSESSMENT — PAIN SCALES - GENERAL: PAINLEVEL: NO PAIN (0)

## 2021-11-10 NOTE — NURSING NOTE
"Chief Complaint   Patient presents with     RECHECK     hypertension        Initial /60   Pulse 84   Temp 97.3  F (36.3  C) (Tympanic)   Resp 20   Wt 76.7 kg (169 lb 2 oz)   SpO2 95%   BMI 31.96 kg/m   Estimated body mass index is 31.96 kg/m  as calculated from the following:    Height as of 10/16/19: 1.549 m (5' 1\").    Weight as of this encounter: 76.7 kg (169 lb 2 oz).  Medication Reconciliation: complete    Jeannette Ozuna LPN  "

## 2021-11-10 NOTE — PROGRESS NOTES
Chief Complaint:  F/U on issues.    HPI: She comes in today for follow-up.  She has a history of chronic atrial fibrillation.  She is not anticoagulated because of a history of a GI bleed.  She has been relatively stable with this and her anemia has been stable.  She also has treated hypertension and is on multiple drugs for control of her blood pressure.  She tolerates these without difficulty.  She has some associated stage III kidney disease and is due for recheck on that.  She also has a history of type 2 diabetes mellitus and is just diet control.  She needs a recheck on that.    She is having troubles with mobility.  Her daughter would like to get her a wheelchair.  She needs this to do any amount of walking such as coming into the clinic or going shopping, etc.    Past Medical History:   Diagnosis Date     Atrial fibrillation (H)     No anticoagulation due to GI bleed     Chronic kidney disease, stage III (moderate) (H)     No Comments Provided     Developmental disorder of scholastic skills     No Comments Provided     Essential (primary) hypertension     No Comments Provided     Gastrointestinal hemorrhage     2012,3 units pRBC     Gout     No Comments Provided     Hyperlipidemia     No Comments Provided     Obesity     No Comments Provided     Type 2 diabetes mellitus without complications (H)     No Comments Provided       Past Surgical History:   Procedure Laterality Date     APPENDECTOMY OPEN       DILATION AND CURETTAGE       EXTRACAPSULAR CATARACT EXTRATION WITH INTRAOCULAR LENS IMPLANT Bilateral      LAPAROSCOPIC TUBAL LIGATION         No Known Allergies    Current Outpatient Medications   Medication Sig Dispense Refill     allopurinol (ZYLOPRIM) 100 MG tablet TAKE 1 TABLET(100 MG) BY MOUTH DAILY 90 tablet 3     aspirin 81 MG tablet Take 81 mg by mouth daily with food       captopril (CAPOTEN) 50 MG tablet TAKE 1 TABLET(50 MG) BY MOUTH TWICE DAILY 180 tablet 3     diltiazem ER COATED BEADS (CARTIA  XT) 240 MG 24 hr capsule Take 1 capsule (240 mg) by mouth daily 90 capsule 3     ferrous sulfate (FEROSUL) 325 (65 Fe) MG tablet Take 1 tablet (325 mg) by mouth daily (with breakfast) 100 tablet 3     furosemide (LASIX) 20 MG tablet Take 1 tablet by mouth daily as needed for edema       metoprolol succinate ER (TOPROL-XL) 200 MG 24 hr tablet Take 1 tablet (200 mg) by mouth daily 90 tablet 3     omeprazole (PRILOSEC) 20 MG DR capsule TAKE 1 CAPSULE(20 MG) BY MOUTH DAILY 90 capsule 3     order for DME Urinary incontinence pad / panty liner       prazosin (MINIPRESS) 5 MG capsule 2 in morning, 1 in evening 270 capsule 3     triamterene-HCTZ (DYAZIDE) 37.5-25 MG capsule Take 1 capsule by mouth daily 90 capsule 3       Social History     Socioeconomic History     Marital status:      Spouse name: Not on file     Number of children: Not on file     Years of education: Not on file     Highest education level: Not on file   Occupational History     Not on file   Tobacco Use     Smoking status: Never Smoker     Smokeless tobacco: Never Used   Vaping Use     Vaping Use: Never used   Substance and Sexual Activity     Alcohol use: No     Drug use: No     Comment: Drug use: No     Sexual activity: Not Currently   Other Topics Concern     Parent/sibling w/ CABG, MI or angioplasty before 65F 55M? Not Asked   Social History Narrative    Cigarettes-none.  Alcohol-none.   and lives in an apartment in town.     Social Determinants of Health     Financial Resource Strain: Not on file   Food Insecurity: Not on file   Transportation Needs: Not on file   Physical Activity: Not on file   Stress: Not on file   Social Connections: Not on file   Intimate Partner Violence: Not on file   Housing Stability: Not on file       Review of Systems   Constitutional: Negative.    Endocrine: Negative.    Skin: Negative.    Allergic/Immunologic: Negative.    Hematological: Negative.        Physical Exam  Vitals and nursing note reviewed.    Constitutional:       General: She is not in acute distress.     Appearance: Normal appearance. She is not ill-appearing, toxic-appearing or diaphoretic.   Cardiovascular:      Rate and Rhythm: Normal rate. Rhythm irregular.   Pulmonary:      Effort: Pulmonary effort is normal.      Breath sounds: Normal breath sounds.   Abdominal:      Palpations: Abdomen is soft.      Tenderness: There is no abdominal tenderness.   Musculoskeletal:      Right lower leg: No edema.      Left lower leg: No edema.   Skin:     General: Skin is warm and dry.   Neurological:      Mental Status: She is alert. Mental status is at baseline.         Assessment:      ICD-10-CM    1. Type 2 diabetes mellitus without complication, without long-term current use of insulin (H)  E11.9 Hemoglobin A1c   2. Stage 3a chronic kidney disease (H)  N18.31 Basic Metabolic Panel   3. Gastrointestinal hemorrhage, unspecified gastrointestinal hemorrhage type  K92.2 CBC W PLT No Diff       Plan: She appears to be stable.  Medications will continue without change.  Lab is pending, I will send a letter with the results and recommendations but I will likely just have her back again in 6 months.  She does qualify for a wheelchair in my opinion, orders are filled out and signed and DME documentation done below.    DME (Durable Medical Equipment) Orders and Documentation  Orders Placed This Encounter   Procedures     Wheelchair Order      The patient was assessed and it was determined the patient is in need of the following listed DME Supplies/Equipment. Please complete supporting documentation below to demonstrate medical necessity.      Wheelchair Documentation  1. The patient has mobility limitations that impairs their ability to participate in one or more mobility related activities: shopping.  2. The patient's mobility limitations cannot be safely resolved by using a cane/walker:No  3. The patients home has adequate access to use a manual wheelchair:Yes  4.  The use of a manual wheelchair on a regular basis will improve the patients ability to participate in mobility related ADL's at home:Yes  5. The patient is willing to use a manual wheelchair at home:Yes  6. The patient has adequate upper body strength and the mental capability to safely use a manual wheelchair and/or has a caregiver that is able to assist: Yes  7. Does the patient have a lower extremity injury or edema?No    Reason for Type of Wheelchair  Patient weight: 169 lbs 2 oz      **Use of a manual wheelchair will significantly improve the patient's ability to participate in MRADLs and the patient will use it on a regular basis in the home. The patient has not expressed an unwillingness to use the manual wheelchair that is provided in the home.**

## 2021-11-10 NOTE — LETTER
November 10, 2021      Lety ALFREDO [facility]  [address] [address]        Dear Lety,    Below are the results of your recent labs:    Results for orders placed or performed in visit on 11/10/21   Hemoglobin A1c     Status: Abnormal   Result Value Ref Range    Hemoglobin A1C 6.4 (H) 4.0 - 6.2 %   Basic Metabolic Panel     Status: Abnormal   Result Value Ref Range    Sodium 138 134 - 144 mmol/L    Potassium 5.4 (H) 3.5 - 5.1 mmol/L    Chloride 105 98 - 107 mmol/L    Carbon Dioxide (CO2) 26 21 - 31 mmol/L    Anion Gap 7 3 - 14 mmol/L    Urea Nitrogen 33 (H) 7 - 25 mg/dL    Creatinine 1.62 (H) 0.60 - 1.20 mg/dL    Calcium 8.8 8.6 - 10.3 mg/dL    Glucose 104 70 - 105 mg/dL    GFR Estimate 27 (L) >60 mL/min/1.73m2   CBC W PLT No Diff     Status: Abnormal   Result Value Ref Range    WBC Count 6.0 4.0 - 11.0 10e3/uL    RBC Count 3.22 (L) 3.80 - 5.20 10e6/uL    Hemoglobin 10.7 (L) 11.7 - 15.7 g/dL    Hematocrit 33.0 (L) 35.0 - 47.0 %     (H) 78 - 100 fL    MCH 33.2 (H) 26.5 - 33.0 pg    MCHC 32.4 31.5 - 36.5 g/dL    RDW 12.6 10.0 - 15.0 %    Platelet Count 138 (L) 150 - 450 10e3/uL        Your blood tests are stable.  You continue to have borderline diabetes as well as weakness of your kidneys and anemia.  I am satisfied with the results.  Assuming all goes well, return in 6 months for a recheck.    Sincerely,        Rodney Nelson MD  Internal Medicine  [facility]

## 2021-12-28 PROBLEM — B37.9 CANDIDA INFECTION: Status: ACTIVE | Noted: 2021-01-01

## 2021-12-28 PROBLEM — L89.302 PRESSURE INJURY OF BUTTOCK, STAGE 2, UNSPECIFIED LATERALITY (H): Status: ACTIVE | Noted: 2021-01-01

## 2021-12-28 PROBLEM — R74.8 ELEVATED CK: Status: ACTIVE | Noted: 2021-01-01

## 2021-12-28 PROBLEM — T79.6XXA TRAUMATIC RHABDOMYOLYSIS, INITIAL ENCOUNTER (H): Status: ACTIVE | Noted: 2021-01-01

## 2021-12-28 PROBLEM — N39.0 URINARY TRACT INFECTION WITH PYURIA: Status: ACTIVE | Noted: 2021-01-01

## 2021-12-28 PROBLEM — N17.9 AKI (ACUTE KIDNEY INJURY) (H): Status: ACTIVE | Noted: 2021-01-01

## 2021-12-28 PROBLEM — M62.81 GENERALIZED MUSCLE WEAKNESS: Status: ACTIVE | Noted: 2021-01-01

## 2021-12-28 NOTE — ED TRIAGE NOTES
ED Nursing Triage Note (General)   ________________________________    Letysophia Luna is a 89 year old Female that presents to triage via EMS after falling out of bed yesterday and not being found until today when patient reached her life alert button.  Patient denies pain on arrival.  Skin breakdown noted under patients abdominal fold.   Significant symptoms had onset 24 hours ago.  Patient appears alert behavior.  GCS-15  Airway: intact  Breathing noted as Normal  Action taken: 3      PRE HOSPITAL PRIOR LIVING SITUATION-home

## 2021-12-29 PROBLEM — R79.89 TROPONIN LEVEL ELEVATED: Status: ACTIVE | Noted: 2021-01-01
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
## 2021-12-29 NOTE — PROGRESS NOTES
Admission Note    Data:  Lety Luna admitted to [facility] from emergency room at 0030.      Action:  Pt oriented to call light in reach.     Response:  Patient confused. Bed alarm activated.       Zulma ENCARNACIONN, RN, PHN on 12/29/2021 at 5:16 AM

## 2021-12-29 NOTE — PLAN OF CARE
Confused. Oriented to self. Bed alarm activated. Multiple skin wounds present: under bilateral breasts, abdominal folds, buttocks, sacrum/coccyx, inner & posterior thighs, perineum, labia, & groin area. Foam dressing(s) applied to buttocks. Interdry to abdominal fold & groin area. Disposable yayo pad under patient; no brief due to moist skin present at admission to abdominal, groin, kostas area. Incontinent of urine & stool. External catheter placed. IVF infusing. Tele monitoring in place. Glasses on & left hearing aid in place.     Temp: 98.2  F (36.8  C) Temp src: Tympanic BP: 91/74 Pulse: 100   Resp: 16 SpO2: 95 % O2 Device: None (Room air)        Problem: Fall Injury Risk  Goal: Absence of Fall and Fall-Related Injury  Outcome: No Change     Problem: UTI (Urinary Tract Infection)  Goal: Improved Infection Symptoms  Outcome: No Change     Zulma ENCARNACIONN, RN, PHN on 12/29/2021 at 5:22 AM

## 2021-12-29 NOTE — PROGRESS NOTES
12/29/21 1059   Quick Adds   Type of Visit Initial Occupational Therapy Evaluation   Living Environment   People in home alone   Current Living Arrangements apartment   Home Accessibility no concerns   Transportation Anticipated family or friend will provide   Self-Care   Usual Activity Tolerance moderate   Current Activity Tolerance fair   Equipment Currently Used at Home walker, rolling   Disability/Function   Hearing Difficulty or Deaf yes   Describe hearing loss bilateral hearing loss   Use of hearing assistive devices bilateral hearing aids   Dressing/Bathing Difficulty yes   Toileting Assistance toileting difficulty, assistance 1 person   Cognitive Status Examination   Orientation Status person;place   Affect/Mental Status (Cognitive) WFL   Follows Commands WFL   Safety Deficit insight into deficits/self-awareness   Memory Deficit minimal deficit   Executive Function Deficit insight/awareness of deficits;problem-solving/reasoning   Visual Perception   Visual Impairment/Limitations WFL   Sensory   Sensory Quick Adds No deficits were identified   Pain Assessment   Patient Currently in Pain No   Range of Motion Comprehensive   General Range of Motion bilateral upper extremity ROM WNL   Coordination   Upper Extremity Coordination No deficits were identified   Bed Mobility   Bed Mobility supine-sit   Supine-Sit [place] (Bed Mobility) minimum assist (75% patient effort)   Assistive Device (Bed Mobility) bed rails   Transfers   Transfers sit-stand transfer   Sit-Stand Transfer   Sit-Stand [place] (Transfers) minimum assist (75% patient effort)   Assistive Device (Sit-Stand Transfers) walker, front-wheeled   Clinical Impression   Criteria for Skilled Therapeutic Interventions Met (OT) yes   OT Diagnosis traumatic Rhabdomyolysis    OT Problem List-Impairments impacting ADL activity tolerance impaired;strength   Assessment of Occupational Performance 1-3 Performance Deficits   Identified Performance  Deficits mobility and self cares    Planned Therapy Interventions (OT) ADL retraining;progressive activity/exercise   Clinical Decision Making Complexity (OT) low complexity   Therapy Frequency (OT) Daily   Predicted Duration of Therapy 2-3 days    Anticipated Equipment Needs Upon Discharge (OT) shower chair   Risk & Benefits of therapy have been explained risks/benefits reviewed   OT Discharge Planning    OT Discharge Recommendation (DC Rec) Transitional Care Facility;home with home care occupational therapy   OT Rationale for DC Rec Pt would benefit from on-going therapies either at home vs STR, will continue to assess    OT Brief overview of current status  Pt very pleasant, very [place] but able to follow simple directions, pt needed min assist for supine to sit and ambulated to recliner using FWW and CGA for safety. Pt completed light self cares while seated in chait with SBA.    Total Evaluation Time (Minutes)   Total Evaluation Time (Minutes) 15

## 2021-12-29 NOTE — H&P
[facility]    History and Physical - Hospitalist Service       Date of Admission:  12/28/2021    Assessment & Plan      Lety Luna is a 89 year old female with a past medical history of atrial fibrillation, CKD stage III, type 2 diabetes, hypertension, and obesity who was admitted on 12/28/2021 after fall and found to be in atrial fibrillation with RVR and had traumatic rhabdomyolysis and ASHLY.  She was treated with IV fluids.    Principal Problem:  Traumatic rhabdomyolysis, initial encounter   Hyperkalemia    ASHLY (acute kidney injury)  Chronic kidney disease, stage III   Sepsis-likely secondary to atrial fibrillation with RVR    Assessment: Unclear how long patient was down as 1 reported 24 hours and patient tells me 5 minutes.  CK is downtrending and now 1085 this a.m. with IV fluids.  Baseline creatinine approximately 1.5.  Down trended from 2.5 with IV fluids.  Hold home antihypertensives and will likely tolerate higher blood pressures with recent fall and age.  Hyperkalemia resolved with IV fluids.  Met criteria for sepsis on admission based on hypotension, tachycardia but feel this is more likely due to atrial fibrillation with RVR.  Blood and urine cultures pending    Plan:   Admit to inpatient  Regular diet  Continue normal saline at 100 mL an hour      Urinary tract infection  Assessment: UA on admission with hematuria and pyuria.  Treating for UTI  Plan:   -Follow urine and blood cultures  -Ceftriaxone 1 g daily          Atrial fibrillation with RVR (H)    Assessment: Suspect may have played a role in her fall/weakness.  Heart rates as high as 140.  Resume her home metoprolol and diltiazem and if persistently elevated will try IV metoprolol pushes.    Plan:   -Metoprolol 200 mg daily  -Diltiazem 240 mg daily  -Not on anticoagulation  -Continue aspirin daily        Diabetes mellitus, type II (H)    Assessment: Most recent hemoglobin A1c 6.4.  Diet controlled.  Follow-up a.m.  glucoses and treat if persistently elevated.         Hypertension    Assessment: Prior to admission on Dyazide 37.5-25, captopril 50 mg twice daily and metoprolol and diltiazem for rate control.    Plan:   -Hold Dyazide and captopril in setting of ASHLY  -Continue metoprolol and diltiazem as above      Generalized muscle weakness  Fall  Pressure injury of buttock, stage 2, unspecified laterality    Assessment: Unclear how long patient was down after fall sounds mechanical in nature but quite weak.  Also having sacral ulcers stage II with local skin breakdown.  No evidence of infection.  Concerned about her living situation at this time.  OT PT and social work to evaluate.    Intertrigo, candidiasis  Assessment: Groin folds quite erythematous and inflamed.  Plan:   -Nystatin and triamcinolone ointment          Troponin level elevated    Assessment: Suspect demand ischemia with A. fib with RVR.  Stabilized at 748.  This is certainly within the range of demand ischemia and patient does not want evaluation in [place].  No current chest pain to suspect true ACS.  Control rate as above.  If chest pain would reorder troponin.    Plan:   Noted     Diet: Advance Diet as Tolerated: Regular Diet Adult    DVT Prophylaxis: Pneumatic Compression Devices  Loza Catheter: Not present  Central Lines: None  Code Status:  Full code    Clinically Significant Risk Factors Present on Admission              # Platelet Defect: home medication list includes an antiplatelet medication   # Obesity: last Body mass index is 31.93 kg/m .      Disposition Plan   Expected Discharge needs improvement in electrolytes and creatinine, evaluation by PT and OT.  Anticipate may need rehab stay on discharge.     The patient's care was discussed with the Patient.    Reynold Holcomb MD  [facility]  Securely message with the Vocera Web Console (learn more here)  Text page via [employer]  Paging/Directory        ______________________________________________________________________    Chief Complaint   Fall    History is obtained from the patient and ED physician    History of Present Illness   Lety Luna is a 89 year old female with past medical history of atrial fibrillation, CKD, diet-controlled diabetes and hypertension who presented to the ED after a fall    Patient states that she was getting out of bed and was unable to stand up so she fell down onto the bed.  She could not get up for approximately 5 minutes and pushed her life alert button.  She denies any chest pain or shortness of breath.  No palpitations.  No dysuria.  No fever chills.  She does feel quite weak and is having trouble getting up at home.    She gave a different history to the ED reportedly fell out of bed and was not found for nearly 24 hours.  She states she lives alone in a can and the chart states that she lives in [place].    Review of Systems    The 10 point Review of Systems is negative other than noted in the HPI or here.     Past Medical History    I have reviewed this patient's medical history and updated it with pertinent information if needed.   Past Medical History:   Diagnosis Date     Atrial fibrillation (H)     No anticoagulation due to GI bleed     Chronic kidney disease, stage III (moderate) (H)     No Comments Provided     Developmental disorder of scholastic skills     No Comments Provided     Essential (primary) hypertension     No Comments Provided     Gastrointestinal hemorrhage     2012,3 units pRBC     Gout     No Comments Provided     Hyperlipidemia     No Comments Provided     Obesity     No Comments Provided     Type 2 diabetes mellitus without complications (H)     No Comments Provided       Past Surgical History   I have reviewed this patient's surgical history and updated it with pertinent information if needed.  Past Surgical History:   Procedure Laterality Date     APPENDECTOMY OPEN        DILATION AND CURETTAGE       EXTRACAPSULAR CATARACT EXTRATION WITH INTRAOCULAR LENS IMPLANT Bilateral      LAPAROSCOPIC TUBAL LIGATION         Social History   I have reviewed this patient's social history and updated it with pertinent information if needed.  Social History     Tobacco Use     Smoking status: Never Smoker     Smokeless tobacco: Never Used   Vaping Use     Vaping Use: Never used   Substance Use Topics     Alcohol use: No     Drug use: No     Comment: Drug use: No       Family History   I have reviewed this patient's family history and updated it with pertinent information if needed.  Family History   Problem Relation Age of Onset     Hypertension Brother      Heart Disease Brother      Other - See Comments Brother         intracerebral hemorrhage/ obesity     Diabetes Sister      Diabetes Sister      Other - See Comments Sister          from pneumonia     Diabetes Father      Heart Disease Father      Diabetes Mother      Heart Disease Mother      Diabetes Son         Diabetes     Heart Disease Brother        Prior to Admission Medications   Prior to Admission Medications   Prescriptions Last Dose Informant Patient Reported? Taking?   allopurinol (ZYLOPRIM) 100 MG tablet Unknown at Unknown time  No Yes   Sig: TAKE 1 TABLET(100 MG) BY MOUTH DAILY   aspirin 81 MG tablet Unknown at Unknown time  Yes Yes   Sig: Take 81 mg by mouth daily with food   captopril (CAPOTEN) 50 MG tablet Unknown at Unknown time  No Yes   Sig: TAKE 1 TABLET(50 MG) BY MOUTH TWICE DAILY   diltiazem ER COATED BEADS (CARTIA XT) 240 MG 24 hr capsule Unknown at Unknown time  No Yes   Sig: Take 1 capsule (240 mg) by mouth daily   ferrous sulfate (FEROSUL) 325 (65 Fe) MG tablet Unknown at Unknown time  No Yes   Sig: Take 1 tablet (325 mg) by mouth daily (with breakfast)   furosemide (LASIX) 20 MG tablet Unknown at Unknown time  Yes Yes   Sig: Take 1 tablet by mouth daily as needed for edema   metoprolol succinate ER (TOPROL-XL)  200 MG 24 hr tablet Unknown at Unknown time  No Yes   Sig: Take 1 tablet (200 mg) by mouth daily   omeprazole (PRILOSEC) 20 MG DR capsule Unknown at Unknown time  No Yes   Sig: TAKE 1 CAPSULE(20 MG) BY MOUTH DAILY   order for DME Unknown at Unknown time  Yes Yes   Sig: Urinary incontinence pad / panty liner   prazosin (MINIPRESS) 5 MG capsule Unknown at Unknown time  No Yes   Si in morning, 1 in evening   triamterene-HCTZ (DYAZIDE) 37.5-25 MG capsule Unknown at Unknown time  No Yes   Sig: Take 1 capsule by mouth daily      Facility-Administered Medications: None     Allergies   No Known Allergies    Physical Exam   Vital Signs: Temp: 98.2  F (36.8  C) Temp src: Tympanic BP: 91/74 Pulse: 98   Resp: 16 SpO2: 95 % O2 Device: None (Room air)    Weight: 169 lbs 0 oz    GENERAL: Pleasant 89-year-old woman lying in bed no acute distress  EYES:External eyes and pupil reactions were normal.  HEAD, EARS, NOSE, MOUTH, AND THROAT: Head and facial appearance were normal. Hearing was normal to voice and ear appearance was normal. Nose appearance was normal and there was no discharge. Oropharynx was normal.  NECK: Neck appearance was normal and there were no neck masses.  RESPIRATORY: Clear to auscultation bilateral  CARDIOVASCULAR: Tacky rate, irregularly irregular rhythm.  No peripheral edema appreciated  GASTROINTESTINAL: The abdomen was normal in contour.  There was no mass, organ enlargement, or tenderness.  MUSCULOSKELETAL: Skeletal configurationand muscle mass were normal for age. Joint appearance was overall normal.  LYMPHATIC: There were no enlarged cervical nodes.  SKIN/HAIR/NAILS: Significant intertrigo in the groin folds and pannus.  Stage II sacral ulcer with superficial skin breakdown.  NEUROLOGIC: Alert and oriented x3.  Very hard of hearing  PSYCHIATRIC:  Mood and affect were normal       Data   Data reviewed today: I reviewed all medications, new labs and imaging results over the last 24 hours. I personally  reviewed the EKG tracing showing Atrial fibrillation with RVR.    Recent Labs   Lab 12/29/21  0559 12/28/21  1813   WBC 7.8 9.4   HGB 10.3* 10.6*   * 103*   * 147*    141   POTASSIUM 4.8 5.5*   CHLORIDE 108* 107   CO2 23 22   BUN 57* 62*   CR 2.01* 2.52*   ANIONGAP 12 12   ASHLIE 9.1 9.5   GLC 85 129*   ALBUMIN  --  3.9   PROTTOTAL  --  7.1   BILITOTAL  --  0.7   ALKPHOS  --  53   ALT  --  17   AST  --  42*

## 2021-12-29 NOTE — PHARMACY-ADMISSION MEDICATION HISTORY
Pharmacy -- Admission Medication Reconciliation    Prior to admission (PTA) medications were reviewed and the patient's PTA medication list was updated.    Sources Consulted: patient interview, sure scripts, chart review, rayray Lindsey phone interview [phone], MISTI Thakur at [facility]    The reliability of this Medication Reconciliation is: Reliability: Borderline reliable    The following significant changes were made:      Removed furosemide    **it is does not seem patient is taking furosemide.  Per [facility], there is no furosemide on file there and their records go back 18 months.  This was removed.    **patient is not sure if she is taking any vitamins/over the counter medications.  Talked to son who picks up her medications but is unaware of the names or what she takes.  He does not live with her and unable to get the bottles to discuss.    In addition, the patient's allergies were reviewed with the patient and updated as follows:   Allergies: Patient has no known allergies.    The pharmacist has reviewed with the patient that all personal medications should be removed from the building or locked in the belongings safe.  Patient shall only take medications ordered by the physician and administered by the nursing staff.       Medication barriers identified: son picks up medications   Medication adherence concerns: none noted, sure scripts up to date   Understanding of emergency medications: MICHA Payan [facility], 12/29/2021,  8:50 AM

## 2021-12-29 NOTE — ED PROVIDER NOTES
History     Chief Complaint   Patient presents with     Fall     HPI  Lety Luna is a 89 year old female who is brought in for evaluation via EMS.  Apparently patient fell out of bed yesterday and was not found until today when patient reached for her life alert button.  Upon arrival she is noted to have significant breakdown to her rectal area skin, buttocks area, under her right breast as well as under her abdominal fold.  She reports increased weakness and fatigue over the past few days.  She denies any pain anywhere else.  Denies hitting her head.      Allergies:  No Known Allergies    Problem List:    Patient Active Problem List    Diagnosis Date Noted     Spondylosis of cervical region without myelopathy or radiculopathy 10/16/2019     Priority: Medium     Diabetes mellitus, type II (H) 01/23/2018     Priority: Medium     Gout 01/23/2018     Priority: Medium     Hypertension 01/23/2018     Priority: Medium     Hypertriglyceridemia 01/23/2018     Priority: Medium     Learning disability 01/23/2018     Priority: Medium     Obesity 01/23/2018     Priority: Medium     Peripheral edema 09/19/2017     Priority: Medium     Cardiomyopathy (H) 09/16/2014     Priority: Medium     Overview:   Probable rate related from atrial fibrillation       GI bleed 07/25/2012     Priority: Medium     Arthropathy 10/04/2010     Priority: Medium     Atrial fibrillation (H) 02/09/2010     Priority: Medium     Chronic kidney disease, stage III (moderate) (H) 02/09/2010     Priority: Medium        Past Medical History:    Past Medical History:   Diagnosis Date     Atrial fibrillation (H)      Chronic kidney disease, stage III (moderate) (H)      Developmental disorder of scholastic skills      Essential (primary) hypertension      Gastrointestinal hemorrhage      Gout      Hyperlipidemia      Obesity      Type 2 diabetes mellitus without complications (H)        Past Surgical History:    Past Surgical History:   Procedure  Laterality Date     APPENDECTOMY OPEN       DILATION AND CURETTAGE       EXTRACAPSULAR CATARACT EXTRATION WITH INTRAOCULAR LENS IMPLANT Bilateral      LAPAROSCOPIC TUBAL LIGATION         Family History:    Family History   Problem Relation Age of Onset     Hypertension Brother      Heart Disease Brother      Other - See Comments Brother         intracerebral hemorrhage/ obesity     Diabetes Sister      Diabetes Sister      Other - See Comments Sister          from pneumonia     Diabetes Father      Heart Disease Father      Diabetes Mother      Heart Disease Mother      Diabetes Son         Diabetes     Heart Disease Brother        Social History:  Marital Status:   [5]  Social History     Tobacco Use     Smoking status: Never Smoker     Smokeless tobacco: Never Used   Vaping Use     Vaping Use: Never used   Substance Use Topics     Alcohol use: No     Drug use: No     Comment: Drug use: No        Medications:    allopurinol (ZYLOPRIM) 100 MG tablet  aspirin 81 MG tablet  captopril (CAPOTEN) 50 MG tablet  diltiazem ER COATED BEADS (CARTIA XT) 240 MG 24 hr capsule  ferrous sulfate (FEROSUL) 325 (65 Fe) MG tablet  furosemide (LASIX) 20 MG tablet  metoprolol succinate ER (TOPROL-XL) 200 MG 24 hr tablet  omeprazole (PRILOSEC) 20 MG DR capsule  order for DME  prazosin (MINIPRESS) 5 MG capsule  triamterene-HCTZ (DYAZIDE) 37.5-25 MG capsule          Review of Systems   Constitutional: Positive for fatigue. Negative for fever.   HENT: Negative for facial swelling and sore throat.    Eyes: Negative for pain.   Respiratory: Negative for chest tightness, shortness of breath and stridor.    Cardiovascular: Negative for chest pain.   Gastrointestinal: Negative for abdominal pain, constipation, diarrhea, nausea and vomiting.   Genitourinary: Negative for flank pain.   Musculoskeletal: Negative for back pain and neck pain.   Skin: Negative for pallor.   Neurological: Positive for weakness. Negative for dizziness,  tremors and seizures.   Psychiatric/Behavioral: Negative for agitation.   All other systems reviewed and are negative.      Physical Exam   BP: 110/44  Pulse: (!) 125  Temp: 98.2  F (36.8  C)  Resp: 18  Weight: 76.7 kg (169 lb)  SpO2: 98 %      Physical Exam  Vitals and nursing note reviewed.   Constitutional:       General: She is not in acute distress.     Appearance: Normal appearance. She is not ill-appearing or toxic-appearing.   HENT:      Head: Normocephalic. No raccoon eyes, right periorbital erythema or left periorbital erythema.      Right Ear: No drainage or tenderness.      Left Ear: No drainage or tenderness.      Nose: Nose normal.   Eyes:      General: Lids are normal. Gaze aligned appropriately. No scleral icterus.     Extraocular Movements: Extraocular movements intact.   Neck:      Trachea: No tracheal deviation.   Cardiovascular:      Rate and Rhythm: Normal rate.   Pulmonary:      Effort: Pulmonary effort is normal. No respiratory distress.      Breath sounds: No stridor.   Abdominal:      Tenderness: There is no abdominal tenderness.   Genitourinary:     Comments: Was you patient has significant erythema and some start of skin breakdown in her perirectal area as well as her inner thighs.  This is painful to the touch.  Grade 2 decubitus ulcer.  Musculoskeletal:         General: No deformity or signs of injury. Normal range of motion.      Cervical back: Normal range of motion. No signs of trauma.   Skin:     General: Skin is warm and dry.      Coloration: Skin is not jaundiced or pale.      Comments: Patient with painful bindu red erythema underneath her right breast fold as well as underneath her abdominal fold.   Neurological:      General: No focal deficit present.      Mental Status: She is alert and oriented to person, place, and time.      GCS: GCS eye subscore is 4. GCS verbal subscore is 5. GCS motor subscore is 6.      Motor: No tremor or seizure activity.   Psychiatric:          Attention and Perception: Attention normal.         Mood and Affect: Mood normal.         ED Course     EKG shows atrial fibrillation with RVR.  Right bundle branch block.  Left anterior fascicular block.  Bifascicular block.  Cannot rule out inferior infarct, age undetermined heart rate is 107.  The left anterior fascicular block is new in comparison to her EKG on 5/13/19.    ED Course as of 12/28/21 2011   Tue Dec 28, 2021   1948 WBC Urine(!): >182   1959 Troponin I     The Lactic acid level is elevated due to Rhabdomyolysis, pyuria and UTI, and immobilization., at this time there is no sign of severe sepsis or septic shock.         Results for orders placed or performed during the hospital encounter of 12/28/21 (from the past 24 hour(s))   CBC with platelets differential    Narrative    The following orders were created for panel order CBC with platelets differential.  Procedure                               Abnormality         Status                     ---------                               -----------         ------                     CBC with platelets and d...[[id number]]  Abnormal            Final result                 Please view results for these tests on the individual orders.   Lactic acid whole blood   Result Value Ref Range    Lactic Acid 2.4 (H) 0.7 - 2.0 mmol/L   CBC with platelets and differential   Result Value Ref Range    WBC Count 9.4 4.0 - 11.0 10e3/uL    RBC Count 3.24 (L) 3.80 - 5.20 10e6/uL    Hemoglobin 10.6 (L) 11.7 - 15.7 g/dL    Hematocrit 33.2 (L) 35.0 - 47.0 %     (H) 78 - 100 fL    MCH 32.7 26.5 - 33.0 pg    MCHC 31.9 31.5 - 36.5 g/dL    RDW 14.2 10.0 - 15.0 %    Platelet Count 147 (L) 150 - 450 10e3/uL    % Neutrophils 81 %    % Lymphocytes 10 %    % Monocytes 9 %    % Eosinophils 0 %    % Basophils 0 %    % Immature Granulocytes 0 %    NRBCs per 100 WBC 0 <1 /100    Absolute Neutrophils 7.6 1.6 - 8.3 10e3/uL    Absolute Lymphocytes 1.0 0.8 - 5.3 10e3/uL    Absolute  Monocytes 0.8 0.0 - 1.3 10e3/uL    Absolute Eosinophils 0.0 0.0 - 0.7 10e3/uL    Absolute Basophils 0.0 0.0 - 0.2 10e3/uL    Absolute Immature Granulocytes 0.0 <=0.4 10e3/uL    Absolute NRBCs 0.0 10e3/uL       Medications   sodium chloride 0.9% infusion ( Intravenous New Bag 12/28/21 2017)   cefTRIAXone in d5w (ROCEPHIN) intermittent infusion 1 g (0 g Intravenous Stopped 12/28/21 2108)   magnesium sulfate 2 g in water intermittent infusion (2 g Intravenous New Bag 12/28/21 2131)   fluconazole (DIFLUCAN) tablet 150 mg (150 mg Oral Given 12/28/21 1816)       Assessments & Plan (with Medical Decision Making)     I have reviewed the nursing notes.    I have reviewed the findings, diagnosis, plan and need for follow up with the patient.       ED to Inpatient Handoff:    Discussed with Dr. Coreas at [facility]  Patient accepted for Inpatient Stay  Pending studies include blood cultures, UC  Code Status: initiate CPR but no airway intubation           New Prescriptions    No medications on file       Final diagnoses:   Traumatic rhabdomyolysis, initial encounter (H)   Urinary tract infection with pyuria   Generalized muscle weakness   Pressure injury of buttock, stage 2, unspecified laterality (H)   Candida infection - abdomen and right breast   Bacterial sepsis (H)   Hypomagnesemia   Elevated troponin   Afebrile.  Vital signs stable.  Patient with an apparent fall yesterday and unable to get back to her feet.  Patient finally able to reach her alarm and EMS called.  Significant candidiasis to her abdominal fold and underneath her right breast on examination.  She also has grade 2 decubitus ulcerative breakdown of the skin in her perirectal area.  IV established and she was given fluids.  Concerns for rhabdomyolysis given her longstanding immobilization.  EKG shows atrial fibrillation with RVR.  Right bundle branch block.  Left anterior fascicular block.  Bifascicular block.  Cannot rule out inferior infarct, age undetermined  heart rate is 107.  The left anterior fascicular block is new in comparison to her EKG on 5/13/19.  Her troponin is significantly elevated at 923.3.  She declines any chest pain or shortness of breath.  I had a long discussion with her about possible cardiac intervention and she declines the need for it at this time.  She understands the risks include death.  BNP returns elevated at 1085.  No previous for comparison.  Echo on 2/27/2015 shows EF at 40%.  Patient's magnesium returns decreased at 1.4 and replacement initiated.  The patient's lactic acid returns elevated as well at 2.4 fluids continued.  CK total returned elevated at 1239.  Rhabdo myelitis.  CRP is elevated at 199 and ESR is elevated at 34.  CT of the patient's chest abdomen and pelvis without contrast shows Moderate to severe compression fracture of L2 which is technically age indeterminate.  No tenderness to this area to suggest acute findings.  Her UA returns with large amount of leukocyte Estrace, many bacteria, greater than 182 white blood cells and 136 red blood cells.  Pyuria with UTI.  The patient's son arrived and had a long discussion with him and the patient about her wishes.  She reports she does not wish to be transferred to cardiology in [place].  Furthermore if her heart stops she would like CPR to be initiated however she declines any airway management or intubation.  She is open to NG tube feedings if needed as well as antibiotics via IV or IM.  I discussed this case with Dr. Coreas and the patient will be started on ceftriaxone.  She will be admitted at this time for further medical management and evaluation.    P  12/28/2021   [facility]     Sathish Madsen PA-C  12/28/21 5310

## 2021-12-29 NOTE — PROGRESS NOTES
12/29/21 1100   Quick Adds   Type of Visit Initial PT Evaluation   Living Environment   People in home alone   Current Living Arrangements apartment   Home Accessibility no concerns   Transportation Anticipated family or friend will provide   Self-Care   Usual Activity Tolerance moderate   Current Activity Tolerance fair   Equipment Currently Used at Home walker, rolling   Disability/Function   Hearing Difficulty or Deaf yes   Patient's preferred means of communication verbal   Describe hearing loss bilateral hearing loss   Use of hearing assistive devices bilateral hearing aids   Wear Glasses or Blind yes   Vision Management glasses   Concentrating, Remembering or Making Decisions Difficulty yes   Communication difficulty speaking  (rapid speech with poor enunciation of words)   Walking or Climbing Stairs Difficulty yes   Walking or Climbing Stairs ambulation difficulty, requires equipment   Toileting issues no   Doing Errands Independently Difficulty (such as shopping) yes   Fall history within last six months yes   Number of times patient has fallen within last six months   (multiple)   General Information   Referring Physician Zhang   Patient/Family Therapy Goals Statement (PT) return home when/if possible   Existing Precautions/Restrictions fall   Weight-Bearing Status - LLE full weight-bearing   Weight-Bearing Status - RLE full weight-bearing   Cognition   Orientation Status (Cognition) person;place   Affect/Mental Status (Cognition) WFL   Follows Commands (Cognition) WFL   Safety Deficit (Cognition) insight into deficits/self-awareness   Pain Assessment   Patient Currently in Pain Yes, see Vital Sign flowsheet   Integumentary/Edema   Integumentary/Edema Comments reddened areas under skin folds   Posture    Posture Forward head position;Protracted shoulders   Range of Motion (ROM)   ROM Quick Adds ROM WFL   Strength   Manual Muscle Testing Quick Adds Strength WFL   Strength Comments however, patient  fatigues easily with activity   Bed Mobility   Impairments Contributing to Impaired Bed Mobility decreased strength   Comment (Bed Mobility) minimal assist   Transfers   Impairments Contributing to Impaired Transfers decreased strength   Transfer Safety Comments minimal assist with Fww   Gait/Stairs (Locomotion)   [place] Level (Gait) minimum assist (75% patient effort)   Assistive Device (Gait) walker, front-wheeled   Distance in Feet (Required for LE Total Joints) 15   Pattern (Gait) 3-point   Balance   Balance Comments fair with Fww   Sensory Examination   Sensory Perception WFL   Coordination   Coordination no deficits were identified   Muscle Tone   Muscle Tone no deficits were identified   Clinical Impression   Criteria for Skilled Therapeutic Intervention yes, treatment indicated   PT Diagnosis (PT) impaired mobility   Influenced by the following impairments fatigue and weakness   Functional limitations due to impairments activity/gait tolerance   Clinical Presentation Stable/Uncomplicated   Clinical Decision Making (Complexity) low complexity   Therapy Frequency (PT) Daily   Predicted Duration of Therapy Intervention (days/wks) 4-5 days   Planned Therapy Interventions (PT) gait training;bed mobility training;transfer training   Anticipated Equipment Needs at Discharge (PT) walker, rolling   Risk & Benefits of therapy have been explained risks/benefits reviewed;patient   PT Discharge Planning    PT Discharge Recommendation (DC Rec) Transitional Care Facility;home with assist;home with home care physical therapy   PT Rationale for DC Rec to promote patient's highest level of  safe and functional mobility   PT Brief overview of current status  minimall assist for mobilities using a Fww   Total Evaluation Time   Total Evaluation Time (Minutes) 15

## 2021-12-29 NOTE — PROGRESS NOTES
SAFETY CHECKLIST  ID Bands and Risk clasps correct and in place (DNR, Fall risk, Allergy, Latex, Limb):  Yes  All Lines Reconciled and labeled correctly: Yes  Whiteboard updated:Yes  Environmental interventions (bed/chair alarm on, call light, side rails, restraints, sitter....): Yes  Verify Tele #: 5    Zulma ENCARNACIONN, RN, PHN on 12/29/2021 at 1:00 AM

## 2021-12-29 NOTE — PROGRESS NOTES
:    Met with patient to discuss discharge planning.  Patient lives alone in her own apartment.  Her daughter in law Devora arrived and was present during discussion.  Offered short term rehab and patient is agreeable.  She asked me to update patient's son who was waiting in the lobby.  Met with son Ed and he is in agreement.  He updated that patient had a PALS unit on and did not push the button.    Pt/family was given the Medicare Compare list for SNF, with associated star ratings to assist with choice for referrals/discharge planning Yes    Education was given to pt/family that star ratings are updated/maintained by Medicare and can be reviewed by visiting www.medicare.gov Yes    Patient selected [facility].  Verbal referral placed to Maia at [facility] and written referral faxed.  They have a bed and will screen referral.    MARIAN Mosley on 12/29/2021 at 2:19 PM

## 2021-12-29 NOTE — ED NOTES
"Pt's son brought to bedside, he reports he lives in [place] and tries to visit him mom daily.  Lately she's been falling, \"her knees just buckle and give out. Can she get some kind of injection in them?\"  She uses a walker at home.   PA notified of son's arrival.   "

## 2021-12-29 NOTE — PROGRESS NOTES
Reviewing chart in anticipation of admission to [facility]. Report received from ER, RN.     Zulma ENCARNACIONN, RN, PHN on 12/29/2021 at 12:05 AM

## 2021-12-29 NOTE — PROGRESS NOTES
SAFETY CHECKLIST  ID Bands and Risk clasps correct and in place (DNR, Fall risk, Allergy, Latex, Limb):  Yes  All Lines Reconciled and labeled correctly: Yes  Whiteboard updated:Yes  Environmental interventions (bed/chair alarm on, call light, side rails, restraints, sitter....): Yes  Verify Tele #: 5

## 2021-12-29 NOTE — PROGRESS NOTES
Received a call from ICU. Patient's HR running 140-150 bpm.  Gave 10:00 am diltiazem and metoprolol per Dr. Holcomb.

## 2021-12-30 NOTE — PROGRESS NOTES
12/29/21 1100   Signing Clinician's Name / Credentials   Signing clinician's name / credentials Jamison Jesus MPT   Quick Adds   Rehab Discipline PT   Quick Adds Mobility;Therapeutic Activity   Functional Transfer Training   Symptoms Noted During/After Treatment fatigue   Treatment Detail minimal assist of 1 with Fww   Gait Training   Symptoms Noted During/After Treatment (Gait Training) fatigue   Distance in Feet (Required for LE Total Joints) 15   Treatment Detail short ambulation within patient's room   [place] Level (Gait Training) minimum assist (75% patient effort)   Physical Assistance Level (Gait Training) 1 person assist   Weight Bearing (Gait Training) full weight-bearing   Assistive Device (Gait Training) rolling walker   Therapeutic Activity   Treatment Detail minimal assist for bed mobilities   PT Discharge Planning    PT Discharge Recommendation (DC Rec) Transitional Care Facility;home with assist;home with home care physical therapy   PT Rationale for DC Rec to promote patient's highest level of  safe and functional mobility   PT Brief overview of current status  minimall assist of 1 for mobilities using a Fww   Additional Documentation   Rehab Comments patient would benefit from continued PT in short term rehab   PT Plan continue PT   Total Session Time   Total Session Time (minutes) 55 minutes

## 2021-12-30 NOTE — PROGRESS NOTES
SAFETY CHECKLIST  ID Bands and Risk clasps correct and in place (DNR, Fall risk, Allergy, Latex, Limb):  Yes  All Lines Reconciled and labeled correctly: Yes  Whiteboard updated:Yes  Environmental interventions (bed/chair alarm on, call light, side rails, restraints, sitter....): Yes  Verify Tele #:      Repair Performed By Another Provider Text (Leave Blank If You Do Not Want): After the tissue was excised the defect was repaired by another provider.

## 2021-12-30 NOTE — PROGRESS NOTES
12/30/21 9800   Signing Clinician's Name / Credentials   Signing clinician's name / credentials Ellen Maza OTR/L    Quick Adds   Rehab Discipline OT   Functional Transfer Training   Symptoms Noted During/After Treatment fatigue   Treatment Detail mod assist of 2 for sit to stand    Gait Training   Symptoms Noted During/After Treatment (Gait Training) fatigue   [place] Level (Gait Training) contact guard   Physical Assistance Level (Gait Training) 1 person assist   Assistive Device (Gait Training) rolling walker   Bathing Training   [place] Level (Bathing Training) maximum assist (25% patient effort)  (SBA upper body with set up )   Assistance (Bathing Training) 1 person assist  (with kostas care in standing )   Upper Body Dressing Training   [place] Level (Upper Body Dressing Training) minimum assist (75% patient effort)   Assistance (Upper Body Dressing Training) 1 person assist   OT Discharge Planning    OT Discharge Recommendation (DC Rec) Transitional Care Facility   OT Rationale for DC Rec Pt would greatly benefit from on-going therapies to maximize level of independence needed to return home    OT Brief overview of current status  Pt very pleasant, very [place], follows directions well and expresses her needs well, pt has difficulty moving sit to stand at times, flutuating between needing an assist of 1 and assist of 2. Pt needs assist with all lower body cares and has pain in back kostas area. Will continue to progress pt.    Additional Documentation   OT Plan continue OT    Total Session Time   Total Session Time (minutes) 90 minutes

## 2021-12-30 NOTE — PLAN OF CARE
"BP 98/78 (BP Location: Left arm, Patient Position: Right side)   Pulse 99   Temp 98.2  F (36.8  C) (Tympanic)   Resp 18   Ht 1.549 m (5' 1\")   Wt 76.7 kg (169 lb)   SpO2 92%   BMI 31.93 kg/m        /64 (BP Location: Left arm, Patient Position: Sitting)   Pulse 97   Temp 98.2  F (36.8  C) (Tympanic)   Resp 20   Ht 1.549 m (5' 1\")   Wt 79.5 kg (175 lb 3.2 oz)   SpO2 96%   BMI 33.10 kg/m      Patient admitted for [facility]hab[facility] after fall at home. Assist of 2 for mobility, transfers and ambulation. Assist 1 for personal hygiene. Pleasantly confused. Alert to self. Denies pain at this time. Incontinent of bladder with purwick in place. MASD under breasts, abd folds, groin with maceration to buttocks from incontinence. Patient removed interdry to her folds. Patient removed purwick and refused to have replaced. Update bedside commode. One bladder incontinence. Change to [id number]. Patient pulled out IV, 2 attempts to replace unsuccessful and patient agitated.     "

## 2021-12-30 NOTE — PROGRESS NOTES
:     Received a message from Maia at [facility]. VM left yesterday stating they would be able to accept patient.    Completed PAS screening for SNF placement.  The confirmation number is [id number].    MARIAN Mosley on 12/30/2021 at 8:37 AM

## 2021-12-30 NOTE — PLAN OF CARE
Patient is alert and orientated to self and place. Patient has raw areas under folds of her breast and stomach; Interdry is in folds and Buttock crease which is inflamed and painful. Tylenol requested and given at 1525.  She is an assist of 2 to ambulate to the bathroom. She likes to sit on the side of her bed or in the chair.

## 2021-12-30 NOTE — PROGRESS NOTES
:     Spoke with Maia at [facility] as patient is now requiring an A X 2.  They do not have staffing to accommodate A x 2.  They will reconsider patient tomorrow if mobility improves to A x 1.      MARIAN Mosley on 12/30/2021 at 10:04 AM

## 2021-12-30 NOTE — PROGRESS NOTES
[facility]    Medicine Progress Note - Hospitalist Service       Date of Admission:  12/28/2021    Assessment & Plan              Lety Luna is a 89 year old female with a past medical history of atrial fibrillation, CKD stage III, type 2 diabetes, hypertension, and obesity who was admitted on 12/28/2021 after fall and found to be in atrial fibrillation with RVR and had traumatic rhabdomyolysis and ASHLY.  She was treated with IV fluids.  Urine culture growing ESBL E. coli resistant to ceftriaxone.    Principal Problem:  Traumatic rhabdomyolysis, initial encounter   Hyperkalemia    ASHLY (acute kidney injury)  Chronic kidney disease, stage III     Assessment: Unclear how long patient was down as reported 24 hours and patient tells me 5 minutes.  CK greatly improved but weakness persists.  Anticipate weakness to improve with treatment of her UTI as was not on appropriate antibiotics yesterday ASHLY continues to improve with hydration.  Stop IV fluids and push oral fluids today.  Hold home antihypertensives and will likely tolerate higher blood pressures with recent fall and age.  Hyperkalemia resolved with IV fluids.      Plan:   Admit to inpatient  Regular diet  Continue normal saline at 100 mL an hour      Urinary tract infection secondary to ESBL E. coli  Sepsis  Assessment: UA on admission with hematuria and pyuria.  Borderline hypotension despite holding her home antihypertensives.  Urine culture growing ESBL E. coli mostly sensitive to most antibiotics but resistant to ceftriaxone per lab report.  Convert to oral Augmentin  Plan:   -Follow urine and blood cultures  -Augmentin 500-1 25 every 12 hours          Atrial fibrillation with RVR (H)    Assessment: Suspect may have played a role in her fall/weakness.  Heart rates have improved with home metoprolol and diltiazem.  Now rate controlled and hyperkalemia resolved so okay to stop cardiac monitor    Plan:   -Metoprolol 200 mg  daily  -Diltiazem 240 mg daily  -Not on anticoagulation  -Continue aspirin daily  -DC cardiac monitor      Diabetes mellitus, type II (H)    Assessment: Most recent hemoglobin A1c 6.4.  Diet controlled.  Follow-up a.m. glucoses and treat if persistently elevated.         Hypertension    Assessment: Prior to admission on Dyazide 37.5-25, captopril 50 mg twice daily and metoprolol and diltiazem for rate control.    Plan:   -Hold Dyazide and captopril in setting of ASHLY  -Continue metoprolol and diltiazem as above      Generalized muscle weakness  Fall  Pressure injury of buttock, stage 2, unspecified laterality    Assessment: Unclear how long patient was down after fall sounds mechanical in nature but quite weak.  Also having sacral ulcers stage II with local skin breakdown.  No evidence of infection.  Concerned about her living situation at this time.  OT PT and social work to evaluate.    Intertrigo, candidiasis  Assessment: Groin folds quite erythematous and inflamed.  Plan:   -Nystatin and triamcinolone ointment          Troponin level elevated    Assessment: Suspect demand ischemia with A. fib with RVR.  Stabilized at 748.  This is certainly within the range of demand ischemia and patient does not want evaluation in [facility].  No current chest pain to suspect true ACS.  Control rate as above.  If chest pain would reorder troponin.    Plan:   Noted       Diet: Advance Diet as Tolerated: Regular Diet Adult    DVT Prophylaxis: SCD  Loza Catheter: Not present  Central Lines: None  Code Status: Full Code      Disposition Plan   Expected Discharge:   Needs improvement in mobility, continue oral antibiotics on discharge     The patient's care was discussed with the Bedside Nurse, Care Coordinator/ and Patient.    Reynold Holcomb MD  Hospitalist Service  [facility]  Securely message with the Vocera Web Console (learn more here)  Text page via [employer] Paging/Directory        Clinically  Significant Risk Factors Present on Admission             # Obesity: last Body mass index is 33.1 kg/m .      ______________________________________________________________________    Interval History   Feels well this morning  Not ready to go home yet  Still feels weak  No abdominal pain  No fever chills    Data reviewed today: I reviewed all medications, new labs and imaging results over the last 24 hours. I personally reviewed no images or EKG's today.    Physical Exam   Vital Signs: Temp: (!) 95.8  F (35.4  C) Temp src: Oral BP: 108/65 Pulse: 88   Resp: 20 SpO2: 96 % O2 Device: None (Room air)    Weight: 175 lbs 3.2 oz  General: Pleasant 89-year-old woman sitting in chair no acute distress  Pulmonary: Clear to auscultation bilaterally  CV: Regular rate, irregularly irregular rhythm, no peripheral edema appreciated  GI bowel sounds present, no tenderness on palpation    Data   Recent Labs   Lab 12/30/21  0539 12/29/21  0559 12/28/21  1813   WBC  --  7.8 9.4   HGB  --  10.3* 10.6*   MCV  --  103* 103*   PLT  --  145* 147*    143 141   POTASSIUM 4.9 4.8 5.5*   CHLORIDE 108* 108* 107   CO2 23 23 22   BUN 48* 57* 62*   CR 1.61* 2.01* 2.52*   ANIONGAP 7 12 12   ASHLIE 8.8 9.1 9.5   GLC 88 85 129*   ALBUMIN  --   --  3.9   PROTTOTAL  --   --  7.1   BILITOTAL  --   --  0.7   ALKPHOS  --   --  53   ALT  --   --  17   AST  --   --  42*     No results found for this or any previous visit (from the past 24 hour(s)).  Medications     - MEDICATION INSTRUCTIONS -       sodium chloride 100 mL/hr at 12/29/21 2231       amoxicillin-clavulanate  1 tablet Oral Q12H [facility]     aspirin  81 mg Oral Daily     diltiazem ER COATED BEADS  240 mg Oral Daily     metoprolol succinate ER  200 mg Oral Daily     nystatin   Topical TID     nystatin-triamcinolone   Topical BID     pantoprazole  40 mg Oral QAM AC     sodium chloride (PF)  3 mL Intracatheter Q8H

## 2021-12-31 NOTE — PROGRESS NOTES
Patient walked 140 feet one way down the [place] with stand by assist, walker and gate belt early this morning.  She walked again recently with a different staff member as a stand by assist.  Ed, her son, is coming this morning to visit. Confusion at times but redirectable.

## 2021-12-31 NOTE — PROGRESS NOTES
Gave nurse to nurse to nurse Jose, at [facility].  Also gave verbal order from Dr Holcomb for staff at [facility] to use Allevyn foam dressings q 3 days and PRN for patient's buttock wounds that were acquired while she was at home.

## 2021-12-31 NOTE — PHARMACY - DISCHARGE MEDICATION RECONCILIATION AND EDUCATION
[facility]  Part of [address] [address]    December 31, 2021    Dear Pharmacist,    Your customer, Lety Luna, born on 7/24/1929, was recently discharged from [facility].  We have updated her medication list and want to alert you to the following:       Review of your medicines      START taking      Dose / Directions   amoxicillin-clavulanate 500-125 MG tablet  Commonly known as: AUGMENTIN  Indication: Urinary Tract Infection  Used for: UTI due to extended-spectrum beta lactamase (ESBL) producing Escherichia coli      Dose: 1 tablet  Take 1 tablet by mouth every 12 hours  Quantity: 11 tablet  Refills: 0     magnesium oxide 400 MG tablet  Commonly known as: MAG-OX  Used for: Hypomagnesemia      Dose: 400 mg  Take 1 tablet (400 mg) by mouth 2 times daily  Quantity: 30 tablet  Refills: 0     nystatin 468001 UNIT/GM external powder  Commonly known as: MYCOSTATIN  Used for: Candidiasis of skin and nails      Apply topically 3 times daily  Quantity: 45 g  Refills: 1     nystatin-triamcinolone 902744-2.1 UNIT/GM-% external ointment  Commonly known as: MYCOLOG  Used for: Candidiasis of skin and nails      Apply topically 2 times daily  Quantity: 15 g  Refills: 1        CONTINUE these medicines which have NOT CHANGED      Dose / Directions   allopurinol 100 MG tablet  Commonly known as: ZYLOPRIM  Used for: Chronic gout without tophus, unspecified cause, unspecified site      TAKE 1 TABLET(100 MG) BY MOUTH DAILY  Quantity: 90 tablet  Refills: 3     aspirin 81 MG tablet  Commonly known as: ASA      Dose: 81 mg  Take 81 mg by mouth daily with food  Refills: 0     captopril 50 MG tablet  Commonly known as: CAPOTEN  Used for: Benign essential hypertension      TAKE 1 TABLET(50 MG) BY MOUTH TWICE DAILY  Quantity: 180 tablet  Refills: 3     diltiazem ER COATED BEADS 240 MG 24 hr capsule  Commonly known as: Cartia XT  Used for: Chronic atrial  fibrillation (H)      Dose: 240 mg  Take 1 capsule (240 mg) by mouth daily  Quantity: 90 capsule  Refills: 3     ferrous sulfate 325 (65 Fe) MG tablet  Commonly known as: FeroSul  Used for: Iron deficiency anemia, unspecified iron deficiency anemia type      Dose: 325 mg  Take 1 tablet (325 mg) by mouth daily (with breakfast)  Quantity: 100 tablet  Refills: 3     metoprolol succinate  MG 24 hr tablet  Commonly known as: TOPROL-XL  Used for: Benign essential hypertension      Dose: 200 mg  Take 1 tablet (200 mg) by mouth daily  Quantity: 90 tablet  Refills: 3     omeprazole 20 MG DR capsule  Commonly known as: priLOSEC  Used for: H/O: GI bleed      TAKE 1 CAPSULE(20 MG) BY MOUTH DAILY  Quantity: 90 capsule  Refills: 3     order for DME      Urinary incontinence pad / panty liner  Refills: 0        STOP taking    prazosin 5 MG capsule  Commonly known as: MINIPRESS        triamterene-HCTZ 37.5-25 MG capsule  Commonly known as: DYAZIDE              Where to get your medicines      These medications were sent to [id number] - [place], [place] - [address]  [address] [facility] [address]    Phone: [phone]     amoxicillin-clavulanate 500-125 MG tablet    magnesium oxide 400 MG tablet    nystatin 891723 UNIT/GM external powder    nystatin-triamcinolone 186109-0.1 UNIT/GM-% external ointment         We also reviewed Lety Luna's allergy list and updated it as needed:  Allergies: Patient has no known allergies.    Thank you for continuing to care for Lety Wisemanmariana.  We look forward to working together with you in the future.    Sincerely,  Mirtha Clemente, [facility] and [facility]

## 2021-12-31 NOTE — PROGRESS NOTES
:     Coordination with Maia from [facility].  They are able to accept patient today at 1100.  [facility] will transport.  Contacted son and updated on discharge.  He will plan meet patient over at [facility].      Updated patient on discharge plans.    MARIAN Mosley on 12/31/2021 at 9:15 AM

## 2021-12-31 NOTE — PROGRESS NOTES
:     Left a message for daughter that a bag had been left here.  Asked if they could pick it up.    MARIAN Mosley on 12/31/2021 at 12:03 PM

## 2021-12-31 NOTE — PLAN OF CARE
Patient CK has been improving.  Patient has intermittent confusion, easily redirectable, no impulsiveness.  Redness under breasts, groins, buttocks and coccyx.  Nystatin, barrier cream and interdry placed.  Patient up with assist x 1, wheeled walker and gait belt.  Denies any pain unless cleaning reddened areas. VSS, afebrile.

## 2021-12-31 NOTE — PHARMACY - DISCHARGE MEDICATION RECONCILIATION AND EDUCATION
Pharmacy:  Discharge Counseling and Medication Reconciliation    Lety Luna  [address]   [facility] [address]  [phone] (home)   89 year old female  PCP: Rodney Nelson    Allergies: Patient has no known allergies.    Discharge Counseling:    Patient is going to [facility], where nursing staff coordiante medications. Pharmacist did not meet with patient today to review the medication portion of the After Visit Summary.    Summary of Education: provided printed material for nursing staff on amoxicillin/clavulanate, magnesium oxide, nystatin powder and nystatin/triamcinolone ointment    Materials Provided:  MedCounselor sheets printed from Clinical Pharmacology on:   amoxicillin/clavulanate, magnesium oxide, nystatin powder and nystatin/triamcinolone ointment  Discharge Medication Reconciliation:    It has been determined that the patient has an adequate supply of medications available or which can be obtained from the patient's preferred pharmacy, which staff has confirmed as: [phone]. An updated medication list will be faxed to the patient's pharmacy.    Thank you for the consult.    Mirtha Clemente [facility]........December 31, 2021 10:19 AM

## 2021-12-31 NOTE — DISCHARGE SUMMARY
[facility]    Discharge Summary  Hospitalist    Date of Admission:  12/28/2021  Date of Discharge:  12/31/2021  Discharging Provider: Reynold Holcomb  Date of Service (when I saw the patient): 12/31/21    Discharge Diagnoses   Principal Problem:    Traumatic rhabdomyolysis, initial encounter (H) (12/28/2021)  ESBL producing E Coli UTI  Sepsis  Active Problems:    Atrial fibrillation (H) (2/9/2010)    Cardiomyopathy (H) (9/16/2014)    Chronic kidney disease, stage III (moderate) (H) (2/9/2010)    Diabetes mellitus, type II (H) (1/23/2018)    Hypertension (1/23/2018)    Generalized muscle weakness (12/28/2021)    Urinary tract infection with pyuria (12/28/2021)    Pressure injury of buttock, stage 2, unspecified laterality (H) (12/28/2021)    ASHLY (acute kidney injury) (H) (12/28/2021)    Troponin level elevated (12/29/2021)    Fall (12/29/2021)  Intertrigo  Yeast candidiasis   Hypomagnesemia  Hyperkalemia      History of Present Illness   Lety Luna is a 89 year old female with a past medical history of atrial fibrillation, CKD stage III, type 2 diabetes, hypertension, and obesity who was admitted on 12/28/2021 after fall and found to be in atrial fibrillation with RVR and had traumatic rhabdomyolysis and ASHLY.  She was treated with IV fluids and ASHLY and rhabdo improved.   Patient was resumed on her home atrial fibrillation medications and was rate controlled.   Patient initially met criteria for sepsis due to tachycardia, fever UTI.  Urine culture grew ESBL E. Coli (per lab report is highly resistant but not able to be displayed in the computer) resistant to ceftriaxone.  Was treated with a 7-day course of oral Augmentin.    Patient also suffered from hyperkalemia which resolved with IV fluids and holding her triamterene hydrochlorothiazide and ACE.  Treated her hypomagnesemia as well.  Patient also suffered from significant intertrigo and candidiasis in her groin and stomach folds.  Treated  with nystatin Ointment and powder.  Also suffering from pressure ulcers on her buttock, stage II and living situation should continue to be addressed.  Troponin level was also incidentally elevated, but not clinically significant.  Psych suspect demand ischemia with her atrial fibrillation and RVR.      Primary care physician/nursing home physician should repeat BMP, magnesium and consider resuming back her prazosin 10 mg a.m., 5 mg p.m. versus other blood pressure medications.  She was stopped on her triamterene-HCTZ due to hyperkalemia, CKD and ASHLY.      Hospital Course   Traumatic rhabdomyolysis, initial encounter   Hyperkalemia    ASHLY (acute kidney injury)  Chronic kidney disease, stage III     Assessment: Unclear how long patient was down as reported 24 hours and patient tells me 5 minutes.  CK greatly improved but weakness persists.  Anticipate weakness to improve with treatment of her UTI as was not on appropriate antibiotics yesterday ASHLY continues to improve with hydration.  Stop IV fluids and push oral fluids today.  Hold home antihypertensives and will likely tolerate higher blood pressures with recent fall and age.  Hyperkalemia resolved with IV fluids.      Plan:   Admit to inpatient  Regular diet  Continue normal saline at 100 mL an hour        Urinary tract infection secondary to ESBL E. coli  Sepsis  Assessment: UA on admission with hematuria and pyuria.  Borderline hypotension despite holding her home antihypertensives.  Urine culture growing ESBL E. coli mostly sensitive to most antibiotics but resistant to ceftriaxone per lab report.  Convert to oral Augmentin  Plan:   -Follow urine and blood cultures  -Augmentin 500-1 25 every 12 hours             Atrial fibrillation with RVR (H)    Assessment: Suspect may have played a role in her fall/weakness.  Heart rates have improved with home metoprolol and diltiazem.  Now rate controlled and hyperkalemia resolved so okay to stop cardiac monitor    Plan:    -Metoprolol 200 mg daily  -Diltiazem 240 mg daily  -Not on anticoagulation  -Continue aspirin daily  -DC cardiac monitor       Diabetes mellitus, type II (H)    Assessment: Most recent hemoglobin A1c 6.4.  Diet controlled.  Follow-up a.m. glucoses and treat if persistently elevated.          Hypertension    Assessment: Prior to admission on Dyazide 37.5-25, captopril 50 mg twice daily and metoprolol and diltiazem for rate control.    Plan:   -Hold Dyazide and captopril in setting of ASHLY  -Continue metoprolol and diltiazem as above       Generalized muscle weakness  Fall  Pressure injury of buttock, stage 2, unspecified laterality    Assessment: Unclear how long patient was down after fall sounds mechanical in nature but quite weak.  Also having sacral ulcers stage II with local skin breakdown.  No evidence of infection.  Concerned about her living situation at this time.  OT PT and social work to evaluate.     Intertrigo, candidiasis  Assessment: Groin folds quite erythematous and inflamed.  Plan:   -Nystatin and triamcinolone ointment             Troponin level elevated    Assessment: Suspect demand ischemia with A. fib with RVR.  Stabilized at 748.  This is certainly within the range of demand ischemia and patient does not want evaluation in [place].  No current chest pain to suspect true ACS.  Control rate as above.  If chest pain would reorder troponin.    Plan:   Noted    Reynold Holcomb MD    Significant Results and Procedures       Pending Results   These results will be followed up by PCP at follow-up  Unresulted Labs Ordered in the Past 30 Days of this Admission     Date and Time Order Name Status Description    12/28/2021  5:55 PM Blood Culture Arm, Left In process     12/28/2021  5:55 PM Blood Culture Arm, Left In process           Code Status   Full Code       Primary Care Physician   ANASTASIIA ARGUETA    Physical Exam   Temp: 98  F (36.7  C) Temp src: Tympanic BP: 132/62 Pulse: 92   Resp: 16 SpO2: 97 % O2  Device: None (Room air)    Vitals:    12/28/21 2100 12/30/21 0100 12/31/21 0053   Weight: 76.7 kg (169 lb) 79.5 kg (175 lb 3.2 oz) 77.8 kg (171 lb 8 oz)     Vital Signs with Ranges  Temp:  [96.4  F (35.8  C)-98.7  F (37.1  C)] 98  F (36.7  C)  Pulse:  [62-95] 92  Resp:  [16-20] 16  BP: (108-132)/(50-65) 132/62  SpO2:  [95 %-97 %] 97 %  I/O last 3 completed shifts:  In: 1370 [P.O.:1370]  Out: 675 [Urine:675]    Constitutional: Pleasant 89-year-old woman sitting in chair in no acute distress  Eyes: Anicteric  ENT: Within normal  Respiratory: Clear to auscultation Bieler  Cardiovascular: Irregularly irregular rhythm, regular rate  GI: Obese abdomen    Discharge Disposition   Discharged to short-term care facility  Condition at discharge: Stable    Consultations This Hospital Stay   CARE MANAGEMENT / SOCIAL WORK IP CONSULT  PHYSICAL THERAPY ADULT IP CONSULT  OCCUPATIONAL THERAPY ADULT IP CONSULT  SOCIAL WORK IP CONSULT  PHYSICAL THERAPY ADULT IP CONSULT  OCCUPATIONAL THERAPY ADULT IP CONSULT    Time Spent on this Encounter   IReynold MD, personally saw the patient today and spent greater than 30 minutes discharging this patient.    Discharge Orders      General info for SNF    Length of Stay Estimate: Short Term Care: Estimated # of Days <30  Condition at Discharge: Improving  Level of care:skilled   Rehabilitation Potential: Good  Admission H&P remains valid and up-to-date: Yes  Recent Chemotherapy: N/A  Use Nursing Home Standing Orders: Yes     Mantoux instructions    Give two-step Mantoux (PPD) Per Facility Policy Yes     Follow Up and recommended labs and tests    Follow up with primary care provider in 1 week.  Will need to follow-up hypertension as her prazosin 10 mg a.m. and 5 mg p.m. and triamterene-hydrochlorothiazide 37.5-25 were both held due to low blood pressures and worsening kidney function and electrolyte abnormalities.  Add back as appropriate.  Repeat BMP and magnesium at follow-up.  Was  discharged with a magnesium supplement, determine if still needed.     Reason for your hospital stay    You are in the hospital secondary to fall, worsening kidney function and urinary tract infection.  You are treated with aggressive IV fluids and your kidney function improved.  You were treated with antibiotics for your urinary tract infection.  Your weakness is likely secondary to the infection.  You also have bad yeast infection of your skin folds and we have given you an ointment and powder for this.     Activity - Up with nursing assistance     Full Code     Physical Therapy Adult Consult    Evaluate and treat as clinically indicated.    Reason: Debility     Occupational Therapy Adult Consult    Evaluate and treat as clinically indicated.    Reason: Debility     Contact Isolation    While on IV antibiotics for ESBL UTI     Diet    Follow this diet upon discharge: Orders Placed This Encounter      Advance Diet as Tolerated: Regular Diet Adult     Discharge Medications   Current Discharge Medication List      START taking these medications    Details   amoxicillin-clavulanate (AUGMENTIN) 500-125 MG tablet Take 1 tablet by mouth every 12 hours  Qty: 11 tablet, Refills: 0    Associated Diagnoses: UTI due to extended-spectrum beta lactamase (ESBL) producing Escherichia coli      magnesium oxide (MAG-OX) 400 MG tablet Take 1 tablet (400 mg) by mouth 2 times daily  Qty: 30 tablet, Refills: 0    Associated Diagnoses: Hypomagnesemia      nystatin (MYCOSTATIN) 607078 UNIT/GM external powder Apply topically 3 times daily  Qty: 45 g, Refills: 1    Associated Diagnoses: Candidiasis of skin and nails      nystatin-triamcinolone (MYCOLOG) 337713-9.1 UNIT/GM-% external ointment Apply topically 2 times daily  Qty: 15 g, Refills: 1    Associated Diagnoses: Candidiasis of skin and nails         CONTINUE these medications which have NOT CHANGED    Details   allopurinol (ZYLOPRIM) 100 MG tablet TAKE 1 TABLET(100 MG) BY MOUTH  DAILY  Qty: 90 tablet, Refills: 3    Associated Diagnoses: Chronic gout without tophus, unspecified cause, unspecified site      aspirin 81 MG tablet Take 81 mg by mouth daily with food      captopril (CAPOTEN) 50 MG tablet TAKE 1 TABLET(50 MG) BY MOUTH TWICE DAILY  Qty: 180 tablet, Refills: 3    Associated Diagnoses: Benign essential hypertension      diltiazem ER COATED BEADS (CARTIA XT) 240 MG 24 hr capsule Take 1 capsule (240 mg) by mouth daily  Qty: 90 capsule, Refills: 3    Associated Diagnoses: Chronic atrial fibrillation (H)      ferrous sulfate (FEROSUL) 325 (65 Fe) MG tablet Take 1 tablet (325 mg) by mouth daily (with breakfast)  Qty: 100 tablet, Refills: 3    Associated Diagnoses: Iron deficiency anemia, unspecified iron deficiency anemia type      metoprolol succinate ER (TOPROL-XL) 200 MG 24 hr tablet Take 1 tablet (200 mg) by mouth daily  Qty: 90 tablet, Refills: 3    Associated Diagnoses: Benign essential hypertension      omeprazole (PRILOSEC) 20 MG DR capsule TAKE 1 CAPSULE(20 MG) BY MOUTH DAILY  Qty: 90 capsule, Refills: 3    Associated Diagnoses: H/O: GI bleed      order for DME Urinary incontinence pad / panty liner         STOP taking these medications       prazosin (MINIPRESS) 5 MG capsule Comments:   Reason for Stopping:         triamterene-HCTZ (DYAZIDE) 37.5-25 MG capsule Comments:   Reason for Stopping:             Allergies   No Known Allergies  Data   Most Recent 3 CBC's:  Recent Labs   Lab Test 12/31/21  0541 12/29/21  0559 12/28/21  1813 11/10/21  0923   WBC  --  7.8 9.4 6.0   HGB 9.3* 10.3* 10.6* 10.7*   MCV  --  103* 103* 103*   PLT  --  145* 147* 138*      Most Recent 3 BMP's:  Recent Labs   Lab Test 12/31/21  0541 12/30/21  0539 12/29/21  0559    138 143   POTASSIUM 4.7 4.9 4.8   CHLORIDE 107 108* 108*   CO2 23 23 23   BUN 43* 48* 57*   CR 1.47* 1.61* 2.01*   ANIONGAP 8 7 12   ASHLIE 8.9 8.8 9.1    88 85     Most Recent 2 LFT's:  Recent Labs   Lab Test 12/28/21  181  06/08/21  0850   AST 42* 10*   ALT 17 6*   ALKPHOS 53 49   BILITOTAL 0.7 0.5     Most Recent INR's and Anticoagulation Dosing History:  Anticoagulation Dose History    There is no flowsheet data to display.       Most Recent 3 Troponin's:No lab results found.  Most Recent Cholesterol Panel:  Recent Labs   Lab Test 06/08/21  0850   CHOL 112   LDL 54   HDL 31   TRIG 137     Most Recent 6 Bacteria Isolates From Any Culture (See EPIC Reports for Culture Details):  Recent Labs   Lab Test 10/17/18  0930 09/21/18  1031   CULT Moderate growth  Yeast  *  No further identification or sensitivity done Moderate growth  Staphylococcus aureus  *     Most Recent TSH, T4 and A1c Labs:  Recent Labs   Lab Test 11/10/21  0923   A1C 6.4*     Results for orders placed or performed during the hospital encounter of 12/28/21   CT Chest Abdomen Pelvis w/o Contrast    Narrative    Exam: CT CHEST ABDOMEN PELVIS W/O CONTRAST    Exam reason: fall at home     TECHNIQUE: Images of the chest, abdomen, and pelvis were obtained  without IV contrast.  Coronal and sagittal reconstructions also  performed. Thick slab coronal MIP reconstructions of the chest also  performed.     COMPARISON: None.     FINDINGS:    Note: Noncontrast images are relatively insensitive for the detection  of solid organ abnormalities and some other types of pathology.    CHEST:  Lungs: There is atelectasis or scarring in the lung bases.  Aby/Mediastinum: No adenopathy or mass.   Pleura: No pleural effusion. No pneumothorax.  Vascular: No aortic aneurysm.   Chest Wall/Axilla: Unremarkable.    ABDOMEN:  Liver: No mass or any significant abnormality.  Gallbladder: There are several small calcified gallstones in the  dependent gallbladder.   Bile Ducts: No significant biliary ductal dilatation.   Spleen:  No splenomegaly or focal lesion.  Pancreas: No mass, ductal dilatation, or inflammatory changes.  Kidneys: No hydronephrosis or any calculi. There are couple of benign  cysts  in the left kidney. There is an intermediate density lesion  measuring up to 2.6 cm in the posterior left kidney.  Adrenals:  No nodules.   Lymph Nodes: No adenopathy.   Vascular: No aortic aneurysm.   Abdominal Wall: No acute findings.     Pelvis: No mass or adenopathy. There is a small amount of  antidependent air in the bladder, presumably due to prior  catheterization.    Bowel/Mesentery/Peritoneal Cavity: No bowel obstruction. There are  numerous calcifications within the mesentery of the mid to right lower  abdomen. No ascites.    Musculoskeletal: No acute osseous abnormalities. Multilevel  degenerative changes of the thoracolumbar spine. There is a moderate  to severe compression fracture of L2, which is age indeterminate.      Impression    IMPRESSION:  Moderate to severe compression fracture of L2 which is technically age  indeterminate.    No acute findings in the chest, abdomen, or pelvis.    QUINN FLORES MD         SYSTEM ID:  [id number]

## 2021-12-31 NOTE — PLAN OF CARE
Patient was up twice today with physical therapy and is requesting to walk in the halls tomorrow.  Vitals have been stable. She is pleasantly confused. Sometimes she is orientated x 4, other times she is only orientated to self.   Patient is now on Contact precautions for ESBL. Antibiotics started.   Patient refused inter dry in her folds today, but did allow nystatin powder.

## 2022-01-01 ENCOUNTER — LAB REQUISITION (OUTPATIENT)
Dept: LAB | Facility: OTHER | Age: 87
End: 2022-01-01
Payer: COMMERCIAL

## 2022-01-01 ENCOUNTER — MEDICAL CORRESPONDENCE (OUTPATIENT)
Dept: HEALTH INFORMATION MANAGEMENT | Facility: OTHER | Age: 87
End: 2022-01-01

## 2022-01-01 ENCOUNTER — TELEPHONE (OUTPATIENT)
Dept: INTERNAL MEDICINE | Facility: OTHER | Age: 87
End: 2022-01-01
Payer: COMMERCIAL

## 2022-01-01 ENCOUNTER — CARE COORDINATION (OUTPATIENT)
Dept: FAMILY MEDICINE | Facility: OTHER | Age: 87
End: 2022-01-01

## 2022-01-01 ENCOUNTER — APPOINTMENT (OUTPATIENT)
Dept: GERIATRICS | Facility: OTHER | Age: 87
End: 2022-01-01
Attending: FAMILY MEDICINE
Payer: COMMERCIAL

## 2022-01-01 ENCOUNTER — CARE COORDINATION (OUTPATIENT)
Dept: INTERNAL MEDICINE | Facility: OTHER | Age: 87
End: 2022-01-01
Payer: COMMERCIAL

## 2022-01-01 ENCOUNTER — NURSING HOME VISIT (OUTPATIENT)
Dept: FAMILY MEDICINE | Facility: OTHER | Age: 87
End: 2022-01-01
Payer: COMMERCIAL

## 2022-01-01 ENCOUNTER — PATIENT OUTREACH (OUTPATIENT)
Dept: CARE COORDINATION | Facility: CLINIC | Age: 87
End: 2022-01-01
Payer: MEDICARE

## 2022-01-01 ENCOUNTER — NURSING HOME VISIT (OUTPATIENT)
Dept: GERIATRICS | Facility: OTHER | Age: 87
End: 2022-01-01
Attending: NURSE PRACTITIONER
Payer: COMMERCIAL

## 2022-01-01 ENCOUNTER — OFFICE VISIT (OUTPATIENT)
Dept: FAMILY MEDICINE | Facility: OTHER | Age: 87
End: 2022-01-01
Attending: FAMILY MEDICINE
Payer: COMMERCIAL

## 2022-01-01 ENCOUNTER — LAB REQUISITION (OUTPATIENT)
Dept: LAB | Facility: OTHER | Age: 87
End: 2022-01-01

## 2022-01-01 ENCOUNTER — LAB REQUISITION (OUTPATIENT)
Dept: LAB | Facility: OTHER | Age: 87
End: 2022-01-01
Payer: MEDICARE

## 2022-01-01 ENCOUNTER — NURSING HOME VISIT (OUTPATIENT)
Dept: GERIATRICS | Facility: OTHER | Age: 87
End: 2022-01-01
Attending: INTERNAL MEDICINE
Payer: COMMERCIAL

## 2022-01-01 ENCOUNTER — OFFICE VISIT (OUTPATIENT)
Dept: FAMILY MEDICINE | Facility: OTHER | Age: 87
End: 2022-01-01
Attending: PHYSICIAN ASSISTANT
Payer: COMMERCIAL

## 2022-01-01 ENCOUNTER — NURSING HOME VISIT (OUTPATIENT)
Dept: GERIATRICS | Facility: OTHER | Age: 87
End: 2022-01-01
Attending: NURSE PRACTITIONER
Payer: MEDICARE

## 2022-01-01 ENCOUNTER — VIRTUAL VISIT (OUTPATIENT)
Dept: FAMILY MEDICINE | Facility: OTHER | Age: 87
End: 2022-01-01
Attending: INTERNAL MEDICINE
Payer: COMMERCIAL

## 2022-01-01 ENCOUNTER — DOCUMENTATION ONLY (OUTPATIENT)
Dept: OTHER | Facility: CLINIC | Age: 87
End: 2022-01-01
Payer: MEDICARE

## 2022-01-01 ENCOUNTER — VIRTUAL VISIT (OUTPATIENT)
Dept: INTERNAL MEDICINE | Facility: OTHER | Age: 87
End: 2022-01-01
Attending: INTERNAL MEDICINE
Payer: COMMERCIAL

## 2022-01-01 ENCOUNTER — OFFICE VISIT (OUTPATIENT)
Dept: INTERNAL MEDICINE | Facility: OTHER | Age: 87
End: 2022-01-01
Attending: INTERNAL MEDICINE
Payer: MEDICARE

## 2022-01-01 ENCOUNTER — TELEPHONE (OUTPATIENT)
Dept: INTERNAL MEDICINE | Facility: OTHER | Age: 87
End: 2022-01-01

## 2022-01-01 ENCOUNTER — HOSPITAL ENCOUNTER (OUTPATIENT)
Dept: GENERAL RADIOLOGY | Facility: OTHER | Age: 87
Discharge: HOME | End: 2022-07-18
Attending: FAMILY MEDICINE
Payer: COMMERCIAL

## 2022-01-01 ENCOUNTER — TRANSFERRED RECORDS (OUTPATIENT)
Dept: HEALTH INFORMATION MANAGEMENT | Facility: OTHER | Age: 87
End: 2022-01-01

## 2022-01-01 VITALS
WEIGHT: 156 LBS | BODY MASS INDEX: 29.48 KG/M2 | DIASTOLIC BLOOD PRESSURE: 82 MMHG | TEMPERATURE: 97 F | SYSTOLIC BLOOD PRESSURE: 134 MMHG | RESPIRATION RATE: 18 BRPM | OXYGEN SATURATION: 100 % | HEART RATE: 51 BPM

## 2022-01-01 VITALS
HEIGHT: 61 IN | DIASTOLIC BLOOD PRESSURE: 60 MMHG | OXYGEN SATURATION: 95 % | SYSTOLIC BLOOD PRESSURE: 134 MMHG | BODY MASS INDEX: 30.4 KG/M2 | HEART RATE: 53 BPM | TEMPERATURE: 97.8 F | WEIGHT: 161 LBS

## 2022-01-01 VITALS
RESPIRATION RATE: 19 BRPM | WEIGHT: 160.6 LBS | DIASTOLIC BLOOD PRESSURE: 68 MMHG | HEART RATE: 67 BPM | TEMPERATURE: 99.1 F | BODY MASS INDEX: 30.35 KG/M2 | OXYGEN SATURATION: 90 % | SYSTOLIC BLOOD PRESSURE: 132 MMHG

## 2022-01-01 VITALS
HEART RATE: 72 BPM | DIASTOLIC BLOOD PRESSURE: 69 MMHG | SYSTOLIC BLOOD PRESSURE: 118 MMHG | RESPIRATION RATE: 17 BRPM | OXYGEN SATURATION: 97 % | WEIGHT: 167.4 LBS | TEMPERATURE: 97.7 F | BODY MASS INDEX: 31.63 KG/M2

## 2022-01-01 DIAGNOSIS — R00.1 SINUS BRADYCARDIA: ICD-10-CM

## 2022-01-01 DIAGNOSIS — N30.01 ACUTE CYSTITIS WITH HEMATURIA: ICD-10-CM

## 2022-01-01 DIAGNOSIS — E11.9 TYPE 2 DIABETES MELLITUS WITHOUT COMPLICATIONS (H): ICD-10-CM

## 2022-01-01 DIAGNOSIS — I48.20 CHRONIC ATRIAL FIBRILLATION (H): ICD-10-CM

## 2022-01-01 DIAGNOSIS — K21.9 GASTROESOPHAGEAL REFLUX DISEASE WITHOUT ESOPHAGITIS: ICD-10-CM

## 2022-01-01 DIAGNOSIS — E87.1 HYPONATREMIA: ICD-10-CM

## 2022-01-01 DIAGNOSIS — I10 PRIMARY HYPERTENSION: Primary | ICD-10-CM

## 2022-01-01 DIAGNOSIS — I48.20 CHRONIC ATRIAL FIBRILLATION (H): Primary | ICD-10-CM

## 2022-01-01 DIAGNOSIS — F03.918 SENILE DEMENTIA, WITH BEHAVIORAL DISTURBANCE (H): ICD-10-CM

## 2022-01-01 DIAGNOSIS — E11.65 TYPE 2 DIABETES MELLITUS WITH HYPERGLYCEMIA, WITHOUT LONG-TERM CURRENT USE OF INSULIN (H): Primary | ICD-10-CM

## 2022-01-01 DIAGNOSIS — F03.918 SENILE DEMENTIA, WITH BEHAVIORAL DISTURBANCE (H): Primary | ICD-10-CM

## 2022-01-01 DIAGNOSIS — U07.1 INFECTION DUE TO 2019 NOVEL CORONAVIRUS: ICD-10-CM

## 2022-01-01 DIAGNOSIS — N18.32 TYPE 2 DIABETES MELLITUS WITH STAGE 3B CHRONIC KIDNEY DISEASE, WITHOUT LONG-TERM CURRENT USE OF INSULIN (H): ICD-10-CM

## 2022-01-01 DIAGNOSIS — I10 BENIGN ESSENTIAL HYPERTENSION: ICD-10-CM

## 2022-01-01 DIAGNOSIS — I48.91 UNSPECIFIED ATRIAL FIBRILLATION (H): ICD-10-CM

## 2022-01-01 DIAGNOSIS — H72.91 PERFORATION OF TYMPANIC MEMBRANE, RIGHT: ICD-10-CM

## 2022-01-01 DIAGNOSIS — I42.9 CARDIOMYOPATHY, UNSPECIFIED TYPE (H): ICD-10-CM

## 2022-01-01 DIAGNOSIS — N18.31 STAGE 3A CHRONIC KIDNEY DISEASE (H): ICD-10-CM

## 2022-01-01 DIAGNOSIS — L30.9 DERMATITIS: ICD-10-CM

## 2022-01-01 DIAGNOSIS — E11.9 TYPE 2 DIABETES MELLITUS WITHOUT COMPLICATION, WITHOUT LONG-TERM CURRENT USE OF INSULIN (H): Primary | ICD-10-CM

## 2022-01-01 DIAGNOSIS — R45.1 AGITATION: ICD-10-CM

## 2022-01-01 DIAGNOSIS — D64.9 ANEMIA, UNSPECIFIED: ICD-10-CM

## 2022-01-01 DIAGNOSIS — N39.0 URINARY TRACT INFECTION, SITE NOT SPECIFIED: ICD-10-CM

## 2022-01-01 DIAGNOSIS — R46.89 OTHER SYMPTOMS AND SIGNS INVOLVING APPEARANCE AND BEHAVIOR: ICD-10-CM

## 2022-01-01 DIAGNOSIS — I10 PRIMARY HYPERTENSION: ICD-10-CM

## 2022-01-01 DIAGNOSIS — I48.11 LONGSTANDING PERSISTENT ATRIAL FIBRILLATION (H): ICD-10-CM

## 2022-01-01 DIAGNOSIS — M1A.9XX0 CHRONIC GOUT WITHOUT TOPHUS, UNSPECIFIED CAUSE, UNSPECIFIED SITE: ICD-10-CM

## 2022-01-01 DIAGNOSIS — U07.1 INFECTION DUE TO 2019 NOVEL CORONAVIRUS: Primary | ICD-10-CM

## 2022-01-01 DIAGNOSIS — R19.7 DIARRHEA, UNSPECIFIED TYPE: ICD-10-CM

## 2022-01-01 DIAGNOSIS — M17.12 PRIMARY OSTEOARTHRITIS OF LEFT KNEE: ICD-10-CM

## 2022-01-01 DIAGNOSIS — N18.30 CHRONIC KIDNEY DISEASE, STAGE 3 UNSPECIFIED (H): ICD-10-CM

## 2022-01-01 DIAGNOSIS — L89.302 PRESSURE INJURY OF BUTTOCK, STAGE 2, UNSPECIFIED LATERALITY (H): ICD-10-CM

## 2022-01-01 DIAGNOSIS — N39.0 URINARY TRACT INFECTION WITH PYURIA: ICD-10-CM

## 2022-01-01 DIAGNOSIS — M17.12 ARTHRITIS OF LEFT KNEE: Primary | ICD-10-CM

## 2022-01-01 DIAGNOSIS — R41.0 ACUTE DELIRIUM: Primary | ICD-10-CM

## 2022-01-01 DIAGNOSIS — T79.6XXD TRAUMATIC RHABDOMYOLYSIS, SUBSEQUENT ENCOUNTER: ICD-10-CM

## 2022-01-01 DIAGNOSIS — M10.9 GOUT, UNSPECIFIED: ICD-10-CM

## 2022-01-01 DIAGNOSIS — H61.23 BILATERAL IMPACTED CERUMEN: ICD-10-CM

## 2022-01-01 DIAGNOSIS — R11.0 NAUSEA: Primary | ICD-10-CM

## 2022-01-01 DIAGNOSIS — Z87.440 PERSONAL HISTORY OF URINARY TRACT INFECTION: Primary | ICD-10-CM

## 2022-01-01 DIAGNOSIS — B37.2 CANDIDIASIS OF SKIN AND NAILS: ICD-10-CM

## 2022-01-01 DIAGNOSIS — E11.22 TYPE 2 DIABETES MELLITUS WITH STAGE 3B CHRONIC KIDNEY DISEASE, WITHOUT LONG-TERM CURRENT USE OF INSULIN (H): ICD-10-CM

## 2022-01-01 DIAGNOSIS — M11.262 CHONDROCALCINOSIS OF LEFT KNEE: ICD-10-CM

## 2022-01-01 DIAGNOSIS — M62.81 GENERALIZED MUSCLE WEAKNESS: ICD-10-CM

## 2022-01-01 DIAGNOSIS — I12.9 HYPERTENSIVE CHRONIC KIDNEY DISEASE WITH STAGE 1 THROUGH STAGE 4 CHRONIC KIDNEY DISEASE, OR UNSPECIFIED CHRONIC KIDNEY DISEASE: ICD-10-CM

## 2022-01-01 DIAGNOSIS — E86.0 DEHYDRATION: ICD-10-CM

## 2022-01-01 DIAGNOSIS — E83.42 HYPOMAGNESEMIA: ICD-10-CM

## 2022-01-01 DIAGNOSIS — N39.0 URINARY TRACT INFECTION WITH PYURIA: Primary | ICD-10-CM

## 2022-01-01 DIAGNOSIS — E11.9 TYPE 2 DIABETES MELLITUS WITHOUT COMPLICATION, WITHOUT LONG-TERM CURRENT USE OF INSULIN (H): ICD-10-CM

## 2022-01-01 DIAGNOSIS — M25.562 ACUTE PAIN OF LEFT KNEE: Primary | ICD-10-CM

## 2022-01-01 DIAGNOSIS — H61.23 EXCESSIVE CERUMEN IN BOTH EAR CANALS: Primary | ICD-10-CM

## 2022-01-01 DIAGNOSIS — N18.32 STAGE 3B CHRONIC KIDNEY DISEASE (H): ICD-10-CM

## 2022-01-01 DIAGNOSIS — N17.9 ACUTE KIDNEY INJURY DUE TO COVID-19 (H): ICD-10-CM

## 2022-01-01 DIAGNOSIS — B37.9 CANDIDA INFECTION: Primary | ICD-10-CM

## 2022-01-01 DIAGNOSIS — I10 ESSENTIAL (PRIMARY) HYPERTENSION: ICD-10-CM

## 2022-01-01 DIAGNOSIS — R31.9 HEMATURIA, UNSPECIFIED: ICD-10-CM

## 2022-01-01 DIAGNOSIS — F05 DELIRIUM DUE TO KNOWN PHYSIOLOGICAL CONDITION: ICD-10-CM

## 2022-01-01 DIAGNOSIS — F22 DELUSIONAL DISORDER (H): ICD-10-CM

## 2022-01-01 DIAGNOSIS — R19.7 DIARRHEA, UNSPECIFIED: ICD-10-CM

## 2022-01-01 DIAGNOSIS — F03.90 SENILE DEMENTIA (H): ICD-10-CM

## 2022-01-01 DIAGNOSIS — Z16.12 EXTENDED SPECTRUM BETA LACTAMASE (ESBL) RESISTANCE: ICD-10-CM

## 2022-01-01 DIAGNOSIS — H90.A31 MIXED CONDUCTIVE AND SENSORINEURAL HEARING LOSS OF RIGHT EAR WITH RESTRICTED HEARING OF LEFT EAR: ICD-10-CM

## 2022-01-01 DIAGNOSIS — U07.1 ACUTE KIDNEY INJURY DUE TO COVID-19 (H): ICD-10-CM

## 2022-01-01 DIAGNOSIS — E11.65 TYPE 2 DIABETES MELLITUS WITH HYPERGLYCEMIA, WITHOUT LONG-TERM CURRENT USE OF INSULIN (H): ICD-10-CM

## 2022-01-01 DIAGNOSIS — R60.0 PERIPHERAL EDEMA: ICD-10-CM

## 2022-01-01 DIAGNOSIS — I10 ESSENTIAL HYPERTENSION: ICD-10-CM

## 2022-01-01 DIAGNOSIS — H90.A22 SENSORINEURAL HEARING LOSS (SNHL) OF LEFT EAR WITH RESTRICTED HEARING OF RIGHT EAR: ICD-10-CM

## 2022-01-01 DIAGNOSIS — F42.4 SKIN PICKING HABIT: ICD-10-CM

## 2022-01-01 DIAGNOSIS — L30.8 DERMATITIS ASSOCIATED WITH MOISTURE: ICD-10-CM

## 2022-01-01 DIAGNOSIS — K92.2 GASTROINTESTINAL HEMORRHAGE, UNSPECIFIED GASTROINTESTINAL HEMORRHAGE TYPE: ICD-10-CM

## 2022-01-01 DIAGNOSIS — R19.7 DIARRHEA, UNSPECIFIED TYPE: Primary | ICD-10-CM

## 2022-01-01 DIAGNOSIS — M17.12 PATELLOFEMORAL ARTHRITIS OF LEFT KNEE: ICD-10-CM

## 2022-01-01 LAB
ALBUMIN SERPL BCG-MCNC: 3.7 G/DL (ref 3.5–5.2)
ALBUMIN SERPL-MCNC: 3.5 G/DL (ref 3.5–5.7)
ALBUMIN UR-MCNC: 30 MG/DL
ALBUMIN UR-MCNC: 50 MG/DL
ALBUMIN UR-MCNC: 70 MG/DL
ALP SERPL-CCNC: 57 U/L (ref 34–104)
ALP SERPL-CCNC: 89 U/L (ref 35–104)
ALT SERPL W P-5'-P-CCNC: 14 U/L (ref 7–52)
ALT SERPL W P-5'-P-CCNC: 16 U/L (ref 10–35)
ANION GAP SERPL CALCULATED.3IONS-SCNC: 10 MMOL/L (ref 3–14)
ANION GAP SERPL CALCULATED.3IONS-SCNC: 11 MMOL/L (ref 7–15)
ANION GAP SERPL CALCULATED.3IONS-SCNC: 13 MMOL/L (ref 7–15)
ANION GAP SERPL CALCULATED.3IONS-SCNC: 14 MMOL/L (ref 7–15)
ANION GAP SERPL CALCULATED.3IONS-SCNC: 6 MMOL/L (ref 3–14)
ANION GAP SERPL CALCULATED.3IONS-SCNC: 9 MMOL/L (ref 3–14)
APPEARANCE UR: ABNORMAL
AST SERPL W P-5'-P-CCNC: 15 U/L (ref 13–39)
AST SERPL W P-5'-P-CCNC: 32 U/L (ref 10–35)
ATRIAL RATE - MUSE: 119 BPM
BACTERIA #/AREA URNS HPF: ABNORMAL /HPF
BACTERIA #/AREA URNS HPF: ABNORMAL /HPF
BACTERIA BLD CULT: NO GROWTH
BACTERIA BLD CULT: NO GROWTH
BACTERIA UR CULT: ABNORMAL
BACTERIA UR CULT: NORMAL
BASOPHILS # BLD AUTO: 0 10E3/UL (ref 0–0.2)
BASOPHILS # BLD AUTO: 0.1 10E3/UL (ref 0–0.2)
BASOPHILS # BLD AUTO: 0.1 10E3/UL (ref 0–0.2)
BASOPHILS NFR BLD AUTO: 0 %
BASOPHILS NFR BLD AUTO: 0 %
BASOPHILS NFR BLD AUTO: 1 %
BILIRUB SERPL-MCNC: 0.3 MG/DL
BILIRUB SERPL-MCNC: 0.3 MG/DL (ref 0.3–1)
BILIRUB UR QL STRIP: NEGATIVE
BUN SERPL-MCNC: 28 MG/DL (ref 7–25)
BUN SERPL-MCNC: 28 MG/DL (ref 7–25)
BUN SERPL-MCNC: 33 MG/DL (ref 7–25)
BUN SERPL-MCNC: 43.6 MG/DL (ref 8–23)
BUN SERPL-MCNC: 44.1 MG/DL (ref 8–23)
BUN SERPL-MCNC: 49 MG/DL (ref 8–23)
C DIFF TOX B STL QL: NEGATIVE
CALCIUM SERPL-MCNC: 8.5 MG/DL (ref 8.2–9.6)
CALCIUM SERPL-MCNC: 8.6 MG/DL (ref 8.2–9.6)
CALCIUM SERPL-MCNC: 8.6 MG/DL (ref 8.6–10.3)
CALCIUM SERPL-MCNC: 8.6 MG/DL (ref 8.6–10.3)
CALCIUM SERPL-MCNC: 8.9 MG/DL (ref 8.6–10.3)
CALCIUM SERPL-MCNC: 9.1 MG/DL (ref 8.2–9.6)
CHLORIDE BLD-SCNC: 94 MMOL/L (ref 98–107)
CHLORIDE BLD-SCNC: 94 MMOL/L (ref 98–107)
CHLORIDE BLD-SCNC: 98 MMOL/L (ref 98–107)
CHLORIDE SERPL-SCNC: 105 MMOL/L (ref 98–107)
CHLORIDE SERPL-SCNC: 105 MMOL/L (ref 98–107)
CHLORIDE SERPL-SCNC: 107 MMOL/L (ref 98–107)
CO2 SERPL-SCNC: 27 MMOL/L (ref 21–31)
CO2 SERPL-SCNC: 29 MMOL/L (ref 21–31)
CO2 SERPL-SCNC: 35 MMOL/L (ref 21–31)
COLOR UR AUTO: YELLOW
CREAT SERPL-MCNC: 1.25 MG/DL (ref 0.51–0.95)
CREAT SERPL-MCNC: 1.31 MG/DL (ref 0.6–1.2)
CREAT SERPL-MCNC: 1.37 MG/DL (ref 0.51–0.95)
CREAT SERPL-MCNC: 1.39 MG/DL (ref 0.6–1.2)
CREAT SERPL-MCNC: 1.49 MG/DL (ref 0.51–0.95)
CREAT SERPL-MCNC: 1.64 MG/DL (ref 0.6–1.2)
DEPRECATED HCO3 PLAS-SCNC: 17 MMOL/L (ref 22–29)
DEPRECATED HCO3 PLAS-SCNC: 19 MMOL/L (ref 22–29)
DEPRECATED HCO3 PLAS-SCNC: 20 MMOL/L (ref 22–29)
DIASTOLIC BLOOD PRESSURE - MUSE: NORMAL MMHG
EOSINOPHIL # BLD AUTO: 0.1 10E3/UL (ref 0–0.7)
EOSINOPHIL # BLD AUTO: 0.2 10E3/UL (ref 0–0.7)
EOSINOPHIL # BLD AUTO: 0.3 10E3/UL (ref 0–0.7)
EOSINOPHIL # BLD AUTO: 0.3 10E3/UL (ref 0–0.7)
EOSINOPHIL NFR BLD AUTO: 0 %
EOSINOPHIL NFR BLD AUTO: 2 %
EOSINOPHIL NFR BLD AUTO: 2 %
EOSINOPHIL NFR BLD AUTO: 3 %
EOSINOPHIL NFR BLD AUTO: 4 %
ERYTHROCYTE [DISTWIDTH] IN BLOOD BY AUTOMATED COUNT: 13.2 % (ref 10–15)
ERYTHROCYTE [DISTWIDTH] IN BLOOD BY AUTOMATED COUNT: 13.5 % (ref 10–15)
ERYTHROCYTE [DISTWIDTH] IN BLOOD BY AUTOMATED COUNT: 14 % (ref 10–15)
ERYTHROCYTE [DISTWIDTH] IN BLOOD BY AUTOMATED COUNT: 14.1 % (ref 10–15)
GFR SERPL CREATININE-BSD FRML MDRD: 29 ML/MIN/1.73M2
GFR SERPL CREATININE-BSD FRML MDRD: 32 ML/MIN/1.73M2
GFR SERPL CREATININE-BSD FRML MDRD: 35 ML/MIN/1.73M2
GFR SERPL CREATININE-BSD FRML MDRD: 36 ML/MIN/1.73M2
GFR SERPL CREATININE-BSD FRML MDRD: 38 ML/MIN/1.73M2
GFR SERPL CREATININE-BSD FRML MDRD: 40 ML/MIN/1.73M2
GLUCOSE BLD-MCNC: 171 MG/DL (ref 70–105)
GLUCOSE BLD-MCNC: 185 MG/DL (ref 70–105)
GLUCOSE BLD-MCNC: 201 MG/DL (ref 70–105)
GLUCOSE SERPL-MCNC: 154 MG/DL (ref 70–99)
GLUCOSE SERPL-MCNC: 155 MG/DL (ref 70–99)
GLUCOSE SERPL-MCNC: 181 MG/DL (ref 70–99)
GLUCOSE UR STRIP-MCNC: NEGATIVE MG/DL
HBA1C MFR BLD: 7.1 % (ref 4–6.2)
HCT VFR BLD AUTO: 29.7 % (ref 35–47)
HCT VFR BLD AUTO: 30.7 % (ref 35–47)
HCT VFR BLD AUTO: 30.9 % (ref 35–47)
HCT VFR BLD AUTO: 33.3 % (ref 35–47)
HCT VFR BLD AUTO: 33.3 % (ref 35–47)
HCT VFR BLD AUTO: 34.6 % (ref 35–47)
HCT VFR BLD AUTO: 35.3 % (ref 35–47)
HGB BLD-MCNC: 10 G/DL (ref 11.7–15.7)
HGB BLD-MCNC: 10.1 G/DL (ref 11.7–15.7)
HGB BLD-MCNC: 10.7 G/DL (ref 11.7–15.7)
HGB BLD-MCNC: 10.9 G/DL (ref 11.7–15.7)
HGB BLD-MCNC: 11.5 G/DL (ref 11.7–15.7)
HGB BLD-MCNC: 11.6 G/DL (ref 11.7–15.7)
HGB BLD-MCNC: 9.7 G/DL (ref 11.7–15.7)
HGB UR QL STRIP: ABNORMAL
HYALINE CASTS: 34 /LPF
IMM GRANULOCYTES # BLD: 0 10E3/UL
IMM GRANULOCYTES # BLD: 0.1 10E3/UL
IMM GRANULOCYTES # BLD: 0.1 10E3/UL
IMM GRANULOCYTES NFR BLD: 0 %
IMM GRANULOCYTES NFR BLD: 1 %
IMM GRANULOCYTES NFR BLD: 2 %
INTERPRETATION ECG - MUSE: NORMAL
KETONES UR STRIP-MCNC: ABNORMAL MG/DL
KETONES UR STRIP-MCNC: NEGATIVE MG/DL
KETONES UR STRIP-MCNC: NEGATIVE MG/DL
LEUKOCYTE ESTERASE UR QL STRIP: ABNORMAL
LYMPHOCYTES # BLD AUTO: 1.8 10E3/UL (ref 0.8–5.3)
LYMPHOCYTES # BLD AUTO: 2.3 10E3/UL (ref 0.8–5.3)
LYMPHOCYTES # BLD AUTO: 2.4 10E3/UL (ref 0.8–5.3)
LYMPHOCYTES # BLD AUTO: 2.5 10E3/UL (ref 0.8–5.3)
LYMPHOCYTES # BLD AUTO: 2.6 10E3/UL (ref 0.8–5.3)
LYMPHOCYTES # BLD AUTO: 2.8 10E3/UL (ref 0.8–5.3)
LYMPHOCYTES # BLD AUTO: 3.3 10E3/UL (ref 0.8–5.3)
LYMPHOCYTES NFR BLD AUTO: 13 %
LYMPHOCYTES NFR BLD AUTO: 29 %
LYMPHOCYTES NFR BLD AUTO: 30 %
LYMPHOCYTES NFR BLD AUTO: 31 %
LYMPHOCYTES NFR BLD AUTO: 32 %
LYMPHOCYTES NFR BLD AUTO: 36 %
LYMPHOCYTES NFR BLD AUTO: 37 %
MAGNESIUM SERPL-MCNC: 1.2 MG/DL (ref 1.9–2.7)
MAGNESIUM SERPL-MCNC: 1.5 MG/DL (ref 1.9–2.7)
MAGNESIUM SERPL-MCNC: 1.5 MG/DL (ref 1.9–2.7)
MAGNESIUM SERPL-MCNC: 1.8 MG/DL (ref 1.9–2.7)
MAGNESIUM SERPL-MCNC: 1.8 MG/DL (ref 1.9–2.7)
MAGNESIUM SERPL-MCNC: 2.3 MG/DL (ref 1.9–2.7)
MCH RBC QN AUTO: 32.8 PG (ref 26.5–33)
MCH RBC QN AUTO: 32.9 PG (ref 26.5–33)
MCH RBC QN AUTO: 33 PG (ref 26.5–33)
MCH RBC QN AUTO: 33.1 PG (ref 26.5–33)
MCH RBC QN AUTO: 33.4 PG (ref 26.5–33)
MCH RBC QN AUTO: 33.7 PG (ref 26.5–33)
MCH RBC QN AUTO: 34 PG (ref 26.5–33)
MCHC RBC AUTO-ENTMCNC: 32.1 G/DL (ref 31.5–36.5)
MCHC RBC AUTO-ENTMCNC: 32.6 G/DL (ref 31.5–36.5)
MCHC RBC AUTO-ENTMCNC: 32.6 G/DL (ref 31.5–36.5)
MCHC RBC AUTO-ENTMCNC: 32.7 G/DL (ref 31.5–36.5)
MCHC RBC AUTO-ENTMCNC: 33.5 G/DL (ref 31.5–36.5)
MCV RBC AUTO: 100 FL (ref 78–100)
MCV RBC AUTO: 101 FL (ref 78–100)
MCV RBC AUTO: 101 FL (ref 78–100)
MCV RBC AUTO: 103 FL (ref 78–100)
MCV RBC AUTO: 104 FL (ref 78–100)
MCV RBC AUTO: 104 FL (ref 78–100)
MCV RBC AUTO: 98 FL (ref 78–100)
MONOCYTES # BLD AUTO: 0.5 10E3/UL (ref 0–1.3)
MONOCYTES # BLD AUTO: 0.7 10E3/UL (ref 0–1.3)
MONOCYTES # BLD AUTO: 0.8 10E3/UL (ref 0–1.3)
MONOCYTES # BLD AUTO: 0.9 10E3/UL (ref 0–1.3)
MONOCYTES NFR BLD AUTO: 7 %
MONOCYTES NFR BLD AUTO: 7 %
MONOCYTES NFR BLD AUTO: 8 %
MONOCYTES NFR BLD AUTO: 8 %
MONOCYTES NFR BLD AUTO: 9 %
MUCOUS THREADS #/AREA URNS LPF: PRESENT /LPF
MUCOUS THREADS #/AREA URNS LPF: PRESENT /LPF
NEUTROPHILS # BLD AUTO: 10.7 10E3/UL (ref 1.6–8.3)
NEUTROPHILS # BLD AUTO: 3.8 10E3/UL (ref 1.6–8.3)
NEUTROPHILS # BLD AUTO: 3.9 10E3/UL (ref 1.6–8.3)
NEUTROPHILS # BLD AUTO: 4.4 10E3/UL (ref 1.6–8.3)
NEUTROPHILS # BLD AUTO: 4.4 10E3/UL (ref 1.6–8.3)
NEUTROPHILS # BLD AUTO: 4.7 10E3/UL (ref 1.6–8.3)
NEUTROPHILS # BLD AUTO: 6 10E3/UL (ref 1.6–8.3)
NEUTROPHILS NFR BLD AUTO: 51 %
NEUTROPHILS NFR BLD AUTO: 52 %
NEUTROPHILS NFR BLD AUTO: 55 %
NEUTROPHILS NFR BLD AUTO: 56 %
NEUTROPHILS NFR BLD AUTO: 57 %
NEUTROPHILS NFR BLD AUTO: 58 %
NEUTROPHILS NFR BLD AUTO: 79 %
NITRATE UR QL: NEGATIVE
NITRATE UR QL: NEGATIVE
NITRATE UR QL: POSITIVE
NRBC # BLD AUTO: 0 10E3/UL
NRBC BLD AUTO-RTO: 0 /100
P AXIS - MUSE: NORMAL DEGREES
PH UR STRIP: 5 [PH] (ref 5–9)
PH UR STRIP: 6 [PH] (ref 5–9)
PH UR STRIP: 6.5 [PH] (ref 5–9)
PLATELET # BLD AUTO: 181 10E3/UL (ref 150–450)
PLATELET # BLD AUTO: 189 10E3/UL (ref 150–450)
PLATELET # BLD AUTO: 195 10E3/UL (ref 150–450)
PLATELET # BLD AUTO: 232 10E3/UL (ref 150–450)
PLATELET # BLD AUTO: 253 10E3/UL (ref 150–450)
PLATELET # BLD AUTO: 255 10E3/UL (ref 150–450)
PLATELET # BLD AUTO: 304 10E3/UL (ref 150–450)
POTASSIUM BLD-SCNC: 4.2 MMOL/L (ref 3.5–5.1)
POTASSIUM BLD-SCNC: 4.2 MMOL/L (ref 3.5–5.1)
POTASSIUM BLD-SCNC: 4.4 MMOL/L (ref 3.5–5.1)
POTASSIUM SERPL-SCNC: 4.4 MMOL/L (ref 3.4–5.3)
POTASSIUM SERPL-SCNC: 5 MMOL/L (ref 3.4–5.3)
POTASSIUM SERPL-SCNC: 5.4 MMOL/L (ref 3.4–5.3)
PR INTERVAL - MUSE: NORMAL MS
PROT SERPL-MCNC: 5.9 G/DL (ref 6.4–8.9)
PROT SERPL-MCNC: 6.9 G/DL (ref 6.4–8.3)
QRS DURATION - MUSE: 132 MS
QT - MUSE: 354 MS
QTC - MUSE: 472 MS
R AXIS - MUSE: -69 DEGREES
RBC # BLD AUTO: 2.93 10E6/UL (ref 3.8–5.2)
RBC # BLD AUTO: 2.97 10E6/UL (ref 3.8–5.2)
RBC # BLD AUTO: 3.08 10E6/UL (ref 3.8–5.2)
RBC # BLD AUTO: 3.2 10E6/UL (ref 3.8–5.2)
RBC # BLD AUTO: 3.21 10E6/UL (ref 3.8–5.2)
RBC # BLD AUTO: 3.49 10E6/UL (ref 3.8–5.2)
RBC # BLD AUTO: 3.53 10E6/UL (ref 3.8–5.2)
RBC URINE: 26 /HPF
RBC URINE: 34 /HPF
RBC URINE: >182 /HPF
SODIUM SERPL-SCNC: 131 MMOL/L (ref 134–144)
SODIUM SERPL-SCNC: 135 MMOL/L (ref 134–144)
SODIUM SERPL-SCNC: 135 MMOL/L (ref 136–145)
SODIUM SERPL-SCNC: 136 MMOL/L (ref 134–144)
SODIUM SERPL-SCNC: 138 MMOL/L (ref 136–145)
SODIUM SERPL-SCNC: 138 MMOL/L (ref 136–145)
SP GR UR STRIP: 1.01 (ref 1–1.03)
SQUAMOUS EPITHELIAL: 2 /HPF
SQUAMOUS EPITHELIAL: 51 /HPF
SYSTOLIC BLOOD PRESSURE - MUSE: NORMAL MMHG
T AXIS - MUSE: -18 DEGREES
URATE SERPL-MCNC: 6.8 MG/DL (ref 4.4–7.6)
UROBILINOGEN UR STRIP-MCNC: NORMAL MG/DL
VENTRICULAR RATE- MUSE: 107 BPM
WBC # BLD AUTO: 10.5 10E3/UL (ref 4–11)
WBC # BLD AUTO: 13.5 10E3/UL (ref 4–11)
WBC # BLD AUTO: 7.2 10E3/UL (ref 4–11)
WBC # BLD AUTO: 7.7 10E3/UL (ref 4–11)
WBC # BLD AUTO: 7.7 10E3/UL (ref 4–11)
WBC # BLD AUTO: 7.8 10E3/UL (ref 4–11)
WBC # BLD AUTO: 8.3 10E3/UL (ref 4–11)
WBC CLUMPS #/AREA URNS HPF: PRESENT /HPF
WBC URINE: >182 /HPF

## 2022-01-01 PROCEDURE — 87086 URINE CULTURE/COLONY COUNT: CPT | Performed by: NURSE PRACTITIONER

## 2022-01-01 PROCEDURE — 99310 SBSQ NF CARE HIGH MDM 45: CPT | Performed by: NURSE PRACTITIONER

## 2022-01-01 PROCEDURE — 99308 SBSQ NF CARE LOW MDM 20: CPT | Performed by: STUDENT IN AN ORGANIZED HEALTH CARE EDUCATION/TRAINING PROGRAM

## 2022-01-01 PROCEDURE — 69210 REMOVE IMPACTED EAR WAX UNI: CPT | Performed by: PHYSICIAN ASSISTANT

## 2022-01-01 PROCEDURE — 81001 URINALYSIS AUTO W/SCOPE: CPT | Performed by: NURSE PRACTITIONER

## 2022-01-01 PROCEDURE — G0180 MD CERTIFICATION HHA PATIENT: HCPCS | Performed by: INTERNAL MEDICINE

## 2022-01-01 PROCEDURE — 92504 EAR MICROSCOPY EXAMINATION: CPT | Performed by: PHYSICIAN ASSISTANT

## 2022-01-01 PROCEDURE — 84550 ASSAY OF BLOOD/URIC ACID: CPT | Performed by: NURSE PRACTITIONER

## 2022-01-01 PROCEDURE — 99356 PR PROLONGED SERV,INPATIENT,1ST HR: CPT | Performed by: NURSE PRACTITIONER

## 2022-01-01 PROCEDURE — 85025 COMPLETE CBC W/AUTO DIFF WBC: CPT | Performed by: INTERNAL MEDICINE

## 2022-01-01 PROCEDURE — 80053 COMPREHEN METABOLIC PANEL: CPT | Performed by: NURSE PRACTITIONER

## 2022-01-01 PROCEDURE — 85025 COMPLETE CBC W/AUTO DIFF WBC: CPT | Performed by: NURSE PRACTITIONER

## 2022-01-01 PROCEDURE — 83735 ASSAY OF MAGNESIUM: CPT | Performed by: NURSE PRACTITIONER

## 2022-01-01 PROCEDURE — 99309 SBSQ NF CARE MODERATE MDM 30: CPT | Performed by: NURSE PRACTITIONER

## 2022-01-01 PROCEDURE — 99441 PR PHYSICIAN TELEPHONE EVALUATION 5-10 MIN: CPT | Mod: 93 | Performed by: NURSE PRACTITIONER

## 2022-01-01 PROCEDURE — G0463 HOSPITAL OUTPT CLINIC VISIT: HCPCS

## 2022-01-01 PROCEDURE — 99213 OFFICE O/P EST LOW 20 MIN: CPT | Mod: 25 | Performed by: PHYSICIAN ASSISTANT

## 2022-01-01 PROCEDURE — 80053 COMPREHEN METABOLIC PANEL: CPT | Performed by: INTERNAL MEDICINE

## 2022-01-01 PROCEDURE — 87493 C DIFF AMPLIFIED PROBE: CPT | Performed by: INTERNAL MEDICINE

## 2022-01-01 PROCEDURE — 80048 BASIC METABOLIC PNL TOTAL CA: CPT | Performed by: NURSE PRACTITIONER

## 2022-01-01 PROCEDURE — G0463 HOSPITAL OUTPT CLINIC VISIT: HCPCS | Mod: 25

## 2022-01-01 PROCEDURE — 87086 URINE CULTURE/COLONY COUNT: CPT | Performed by: INTERNAL MEDICINE

## 2022-01-01 PROCEDURE — 83036 HEMOGLOBIN GLYCOSYLATED A1C: CPT | Performed by: NURSE PRACTITIONER

## 2022-01-01 PROCEDURE — 99214 OFFICE O/P EST MOD 30 MIN: CPT | Performed by: FAMILY MEDICINE

## 2022-01-01 PROCEDURE — 99214 OFFICE O/P EST MOD 30 MIN: CPT | Performed by: INTERNAL MEDICINE

## 2022-01-01 PROCEDURE — 73562 X-RAY EXAM OF KNEE 3: CPT | Mod: LT

## 2022-01-01 PROCEDURE — 99308 SBSQ NF CARE LOW MDM 20: CPT | Performed by: INTERNAL MEDICINE

## 2022-01-01 PROCEDURE — 87186 SC STD MICRODIL/AGAR DIL: CPT | Performed by: NURSE PRACTITIONER

## 2022-01-01 PROCEDURE — 99214 OFFICE O/P EST MOD 30 MIN: CPT | Mod: 95 | Performed by: INTERNAL MEDICINE

## 2022-01-01 RX ORDER — PRAZOSIN HYDROCHLORIDE 5 MG/1
CAPSULE ORAL
Qty: 270 CAPSULE | Refills: 3 | OUTPATIENT
Start: 2022-01-01

## 2022-01-01 RX ORDER — METOPROLOL SUCCINATE 50 MG/1
150 TABLET, EXTENDED RELEASE ORAL DAILY
COMMUNITY
Start: 2022-01-01 | End: 2022-01-01

## 2022-01-01 RX ORDER — NYSTATIN 100000 [USP'U]/G
POWDER TOPICAL 3 TIMES DAILY
Qty: 45 G | Refills: 1 | Status: SHIPPED | OUTPATIENT
Start: 2022-01-01 | End: 2022-01-01

## 2022-01-01 RX ORDER — METOPROLOL SUCCINATE 100 MG/1
TABLET, EXTENDED RELEASE ORAL
COMMUNITY
Start: 2022-01-01

## 2022-01-01 RX ORDER — NITROFURANTOIN 25; 75 MG/1; MG/1
100 CAPSULE ORAL 2 TIMES DAILY
Status: CANCELLED | OUTPATIENT
Start: 2022-01-01

## 2022-01-01 RX ORDER — ACETAMINOPHEN 325 MG/1
650 TABLET ORAL EVERY 4 HOURS PRN
COMMUNITY

## 2022-01-01 RX ORDER — CAPTOPRIL 50 MG/1
TABLET ORAL
Qty: 180 TABLET | Refills: 3 | Status: SHIPPED | OUTPATIENT
Start: 2022-01-01 | End: 2022-01-01

## 2022-01-01 RX ORDER — SULFAMETHOXAZOLE/TRIMETHOPRIM 800-160 MG
1 TABLET ORAL 2 TIMES DAILY
Qty: 14 TABLET | Refills: 0 | Status: SHIPPED | OUTPATIENT
Start: 2022-01-01 | End: 2022-01-01

## 2022-01-01 RX ORDER — LORAZEPAM 0.5 MG/1
0.5 TABLET ORAL EVERY 4 HOURS PRN
COMMUNITY
Start: 2022-01-01 | End: 2022-01-01

## 2022-01-01 RX ORDER — PETROLATUM 430 MG/G
OINTMENT TOPICAL
COMMUNITY
Start: 2022-01-01 | End: 2022-01-01

## 2022-01-01 RX ORDER — MULTIVIT-MIN/FOLIC/VIT K/LYCOP 400-300MCG
1 TABLET ORAL 2 TIMES DAILY
COMMUNITY
Start: 2022-01-01

## 2022-01-01 RX ORDER — ALOE VERA/PETROLATUM
OINTMENT (GRAM) TOPICAL
COMMUNITY
Start: 2022-01-01 | End: 2022-01-01

## 2022-01-01 RX ORDER — PRAMOXINE HYDROCHLORIDE 10 MG/ML
LOTION TOPICAL DAILY
COMMUNITY
End: 2022-01-01

## 2022-01-01 RX ORDER — METOPROLOL SUCCINATE 50 MG/1
TABLET, EXTENDED RELEASE ORAL
COMMUNITY
Start: 2022-01-01

## 2022-01-01 RX ORDER — HALOPERIDOL 0.5 MG/1
1 TABLET ORAL EVERY 8 HOURS PRN
COMMUNITY
Start: 2022-01-01

## 2022-01-01 RX ORDER — FAMOTIDINE 20 MG/1
20 TABLET, FILM COATED ORAL DAILY
COMMUNITY
Start: 2022-01-01

## 2022-01-01 RX ORDER — ALLOPURINOL 100 MG/1
TABLET ORAL
Qty: 90 TABLET | Refills: 3 | Status: SHIPPED | OUTPATIENT
Start: 2022-01-01

## 2022-01-01 RX ORDER — TRIAMTERENE AND HYDROCHLOROTHIAZIDE 37.5; 25 MG/1; MG/1
CAPSULE ORAL
Qty: 90 CAPSULE | OUTPATIENT
Start: 2022-01-01

## 2022-01-01 RX ORDER — ACETAMINOPHEN 325 MG/1
650 TABLET ORAL 4 TIMES DAILY
COMMUNITY
Start: 2022-01-01

## 2022-01-01 RX ORDER — DILTIAZEM HYDROCHLORIDE 240 MG/1
CAPSULE, COATED, EXTENDED RELEASE ORAL
Qty: 90 CAPSULE | Refills: 3 | Status: SHIPPED | OUTPATIENT
Start: 2022-01-01

## 2022-01-01 ASSESSMENT — PAIN SCALES - GENERAL
PAINLEVEL: NO PAIN (0)

## 2022-01-01 ASSESSMENT — ENCOUNTER SYMPTOMS
HEMATOLOGIC/LYMPHATIC NEGATIVE: 1
ENDOCRINE NEGATIVE: 1
CONSTITUTIONAL NEGATIVE: 1
ALLERGIC/IMMUNOLOGIC NEGATIVE: 1

## 2022-01-03 NOTE — PROGRESS NOTES
Clinic Care Coordination Contact  Transitional Care Management    Patient discharged to skilled nursing facility for short term rehab. No TCM call required per policy. Discharged to [facility].  Halley Newman RN ,....................  1/3/2022   9:15 AM

## 2022-01-08 PROBLEM — T79.6XXD TRAUMATIC RHABDOMYOLYSIS, SUBSEQUENT ENCOUNTER: Status: ACTIVE | Noted: 2021-01-01

## 2022-01-09 NOTE — PROGRESS NOTES
[facility]  Acute Care Visit  Hospital Followup    Patient Name: Lety Luna   : 1929  MRN: [id number]    Place of Service: [facility]  DOS: 2022    CC: Hospital follow up    HPI:  Lety Luna is a 89 year old female with PMH of multiple chronic medical concerns, who is seen today regarding pt was hospitalized at [facility] - and then to [facility] for rehab.   Pt had been living at home in an apartment when she had a fall. She was brought to hospital found to be in afib with RVR, rhabomyolysis, and +UTI treated with augmentin for 7 days.   She also had hyperkalemia and hypomagnesemia. Her triamterene/ace-I was held due hyperkalemia. Given IV fluids and her electrolyte imbalance did improve. ASHLY with Cr 2.5 and K 5.5 when admitted.   She has a hx of afib, CKD, diabetes type 2, hypertension, and cardiomyopathy.     Pt reports she was managing by herself at home. Her son would help with groceries and she reports she got mostly microwaveable meals.     Pt has been adjusting to [facility] and working therapy. She does have significant MASD on her buttocks.     -130s  Currently taking captopril, diltiazem, metoprolol succinate  HR 70-90s irregular  Blood sugars checked -170s. Diet controlled.     Today pt is oriented to self and location and time just not the exact date but know the day of the week.     Multidisciplinary notes, laboratory values, medications, vital signs, weight and orders arereviewed from nursing home records. I have reviewed the patient s medical history and updated the computerized patient record.     PMH:  Past Medical History:   Diagnosis Date     Atrial fibrillation (H)     No anticoagulation due to GI bleed     Chronic kidney disease, stage III (moderate) (H)     No Comments Provided     Developmental disorder of scholastic skills     No Comments Provided     Essential (primary) hypertension     No Comments Provided      Gastrointestinal hemorrhage     2012,3 units pRBC     Gout     No Comments Provided     Hyperlipidemia     No Comments Provided     Obesity     No Comments Provided     Type 2 diabetes mellitus without complications (H)     No Comments Provided       Medications:  Current Outpatient Medications   Medication Sig Dispense Refill     allopurinol (ZYLOPRIM) 100 MG tablet TAKE 1 TABLET(100 MG) BY MOUTH DAILY 90 tablet 3     amoxicillin-clavulanate (AUGMENTIN) 500-125 MG tablet Take 1 tablet by mouth every 12 hours 11 tablet 0     aspirin 81 MG tablet Take 81 mg by mouth daily with food       captopril (CAPOTEN) 50 MG tablet TAKE 1 TABLET(50 MG) BY MOUTH TWICE DAILY 180 tablet 3     diltiazem ER COATED BEADS (CARTIA XT) 240 MG 24 hr capsule Take 1 capsule (240 mg) by mouth daily 90 capsule 3     ferrous sulfate (FEROSUL) 325 (65 Fe) MG tablet Take 1 tablet (325 mg) by mouth daily (with breakfast) 100 tablet 3     magnesium oxide (MAG-OX) 400 MG tablet Take 1 tablet (400 mg) by mouth 2 times daily 30 tablet 0     metoprolol succinate ER (TOPROL-XL) 200 MG 24 hr tablet Take 1 tablet (200 mg) by mouth daily 90 tablet 3     nystatin (MYCOSTATIN) 800732 UNIT/GM external powder Apply topically 3 times daily 45 g 1     nystatin-triamcinolone (MYCOLOG) 275737-7.1 UNIT/GM-% external ointment Apply topically 2 times daily 15 g 1     omeprazole (PRILOSEC) 20 MG DR capsule TAKE 1 CAPSULE(20 MG) BY MOUTH DAILY 90 capsule 3     order for DME Urinary incontinence pad / panty liner       Medication reconciliation complete between [facility] record and NH MAR.     Allergies:  No Known Allergies    Review of Systems:  +hard of hearing  +dry skin  Denies chest pain, palpitations, shortness of breath, cough  Denies burning or trouble urinating  Denies dizziness, light-headedness  Denies fever, chills    Vital Signs:  Temp 97.6   Pulse 80   Respirations 18   /51   Oxygen Sats 97%   Weight 170.2#    Physical Exam:     Pleasant and alert  without distress.  Hard of hearing wears aidee hearing aides  Clear speech  Sclera nonicteric, conjunctiva non-inflamed.  Skin color pink. Mucous membranes moist.   Neck supple and without adenopathy   Lungs clear but exhales loudly so hard to fully hear  Cardiovascular irregular  Abdomen soft and without tenderness   Redness noted in abdominal folds and under breasts  Buttocks intergluteal cleft moist open areas along both sides  Extremities with 1+ edema.    In wheelchair, able to stand with PT assist   Able to move upper and lower extremities       New Labs/Diagnostics:  Hemoglobin   Date Value Ref Range Status   12/31/2021 9.3 (L) 11.7 - 15.7 g/dL Final   06/08/2021 11.0 (L) 11.7 - 15.7 g/dL Final     Last Comprehensive Metabolic Panel:  Sodium   Date Value Ref Range Status   12/31/2021 138 134 - 144 mmol/L Final   06/08/2021 142 134 - 144 mmol/L Final     Potassium   Date Value Ref Range Status   12/31/2021 4.7 3.5 - 5.1 mmol/L Final   06/08/2021 5.2 (H) 3.5 - 5.1 mmol/L Final     Chloride   Date Value Ref Range Status   12/31/2021 107 98 - 107 mmol/L Final   06/08/2021 108 (H) 98 - 107 mmol/L Final     Carbon Dioxide   Date Value Ref Range Status   06/08/2021 26 21 - 31 mmol/L Final     Carbon Dioxide (CO2)   Date Value Ref Range Status   12/31/2021 23 21 - 31 mmol/L Final     Anion Gap   Date Value Ref Range Status   12/31/2021 8 3 - 14 mmol/L Final   06/08/2021 8 3 - 14 mmol/L Final     Glucose   Date Value Ref Range Status   12/31/2021 100 70 - 105 mg/dL Final   06/08/2021 124 (H) 70 - 105 mg/dL Final     Urea Nitrogen   Date Value Ref Range Status   12/31/2021 43 (H) 7 - 25 mg/dL Final   06/08/2021 29 (H) 7 - 25 mg/dL Final     Creatinine   Date Value Ref Range Status   12/31/2021 1.47 (H) 0.60 - 1.20 mg/dL Final   06/08/2021 1.48 (H) 0.60 - 1.20 mg/dL Final     GFR Estimate   Date Value Ref Range Status   12/31/2021 33 (L) >60 mL/min/1.73m2 Final     Comment:     Effective December 21, 2021 eGFRcr in adults  is calculated using the 2021 CKD-EPI creatinine equation which includes age and gender (James colindres al., [place], DOI: 10.1056/RZLWap1201166)   06/08/2021 33 (L) >60 mL/min/[1.73_m2] Final     Calcium   Date Value Ref Range Status   12/31/2021 8.9 8.6 - 10.3 mg/dL Final   06/08/2021 8.7 8.6 - 10.3 mg/dL Final     Magnesium   Date Value Ref Range Status   12/31/2021 1.7 (L) 1.9 - 2.7 mg/dL Final       Assessment/Plan:  (I48.20) Chronic atrial fibrillation (H)  (primary encounter diagnosis)  Comment: stable, controlled HR  Plan: cont diltiazem, metoprolol, asa    (N18.31) Stage 3a chronic kidney disease (H)  Comment: improving from Cr 2.5 to 1.4  Plan: cont to monitor, avoid nephrotoxic meds  Recheck BMP    (I10) Primary hypertension  Comment: stable  Plan: cont captopril, metoprolol, diltiazem    (R60.9) Peripheral edema  Comment: localized to legs  Plan: elevate, laura stockings      (N39.0) Urinary tract infection with pyuria  Comment: ESBL, fully treated with agumentin  Plan: encourage fluids, monitor for s/sx UTI  Check CBC    (L30.8) Dermatitis associated with moisture  Comment: MASD buttocks and abdominal folds and beneath breast  Plan: apply dimethicone to buttocks, nystatin powder to below breasts and abdominal folds    (T79.6XXD) Traumatic rhabdomyolysis, subsequent encounter  Comment: improving  Plan: encourage fluids  Check BMP, CBC      (E83.42) Hypomagnesemia  Comment: Mg 1.7   Plan: cont Mg replacement  Check Mg    Trying to get a hold of son to discuss end of life care goals--pt is currently Full Code.     A total of 92 minutes was spent with this patient, of which more than 50% was spent in counseling and/or coordination of care.     JASSI Mason, CNP ....................  1/8/2022   6:34 PM

## 2022-01-18 PROBLEM — R74.8 ELEVATED CK: Status: RESOLVED | Noted: 2021-01-01 | Resolved: 2022-01-01
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
## 2022-01-18 PROBLEM — R79.89 TROPONIN LEVEL ELEVATED: Status: RESOLVED | Noted: 2021-01-01 | Resolved: 2022-01-01

## 2022-01-18 PROBLEM — N17.9 AKI (ACUTE KIDNEY INJURY) (H): Status: RESOLVED | Noted: 2021-01-01 | Resolved: 2022-01-01

## 2022-01-18 NOTE — NURSING NOTE
"Chief Complaint   Patient presents with     RE-CERT     Nursing Home       Initial /68   Pulse 67   Temp 99.1  F (37.3  C) (Tympanic)   Resp 19   Wt 72.8 kg (160 lb 9.6 oz)   SpO2 90%   Breastfeeding No   BMI 30.35 kg/m   Estimated body mass index is 30.35 kg/m  as calculated from the following:    Height as of 12/28/21: 1.549 m (5' 1\").    Weight as of this encounter: 72.8 kg (160 lb 9.6 oz).  FOOD SECURITY SCREENING QUESTIONS  Hunger Vital Signs:  Within the past 12 months we worried whether our food would run out before we got money to buy more. Never  Within the past 12 months the food we bought just didn't last and we didn't have money to get more. Never        Brijesh Scott, WEN    "

## 2022-01-18 NOTE — PROGRESS NOTES
Chief Complaint:  NH recertification.    HPI: She is here today for nursing home recertification visit.  She was recently hospitalized after she was found down.  She was found to have a urinary tract infection with sepsis.  She had rhabdomyolysis and was dehydrated.  She was resuscitated and treated appropriately with antibiotics and ultimately discharge.  It was decided that it was time for her to be placed in a nursing home.  She is currently in [facility] and is doing well with that.  She is happy with the care that she is getting and really offers no complaints.  Family is with today and they confirm that things seem to be going quite well.    In the hospital, she was taken off of her Dyazide as well as her terazosin.  Her blood pressure has been stable at the nursing home without these medications.  She of course has chronic anemia from chronic GI bleeding.  She is not anticoagulated for her atrial fibrillation because of the GI bleeding.  This is something that will need to be followed going forward.    The rest of her hospital notes are reviewed.  There were no other significant issues.    Past Medical History:   Diagnosis Date     Atrial fibrillation (H)     No anticoagulation due to GI bleed     Chronic kidney disease, stage III (moderate) (H)     No Comments Provided     Developmental disorder of scholastic skills     No Comments Provided     Essential (primary) hypertension     No Comments Provided     Gastrointestinal hemorrhage     2012,3 units pRBC     Gout     No Comments Provided     Hyperlipidemia     No Comments Provided     Obesity     No Comments Provided     Type 2 diabetes mellitus without complications (H)     No Comments Provided       Past Surgical History:   Procedure Laterality Date     APPENDECTOMY OPEN       DILATION AND CURETTAGE       EXTRACAPSULAR CATARACT EXTRATION WITH INTRAOCULAR LENS IMPLANT Bilateral      LAPAROSCOPIC TUBAL LIGATION         No Known Allergies    Current  Outpatient Medications   Medication Sig Dispense Refill     allopurinol (ZYLOPRIM) 100 MG tablet TAKE 1 TABLET(100 MG) BY MOUTH DAILY 90 tablet 3     aspirin 81 MG tablet Take 81 mg by mouth daily with food       captopril (CAPOTEN) 50 MG tablet TAKE 1 TABLET(50 MG) BY MOUTH TWICE DAILY 180 tablet 3     diltiazem ER COATED BEADS (CARTIA XT) 240 MG 24 hr capsule Take 1 capsule (240 mg) by mouth daily 90 capsule 3     ferrous sulfate (FEROSUL) 325 (65 Fe) MG tablet Take 1 tablet (325 mg) by mouth daily (with breakfast) 100 tablet 3     magnesium oxide (MAG-OX) 400 MG tablet Take 1 tablet (400 mg) by mouth 2 times daily 30 tablet 0     metoprolol succinate ER (TOPROL-XL) 200 MG 24 hr tablet Take 1 tablet (200 mg) by mouth daily 90 tablet 3     nystatin (MYCOSTATIN) 120060 UNIT/GM external powder Apply topically 3 times daily 45 g 1     nystatin-triamcinolone (MYCOLOG) 083106-8.1 UNIT/GM-% external ointment Apply topically 2 times daily 15 g 1     omeprazole (PRILOSEC) 20 MG DR capsule TAKE 1 CAPSULE(20 MG) BY MOUTH DAILY 90 capsule 3     order for DME Urinary incontinence pad / panty liner         Social History     Socioeconomic History     Marital status:      Spouse name: Not on file     Number of children: Not on file     Years of education: Not on file     Highest education level: Not on file   Occupational History     Not on file   Tobacco Use     Smoking status: Never Smoker     Smokeless tobacco: Never Used   Vaping Use     Vaping Use: Never used   Substance and Sexual Activity     Alcohol use: No     Drug use: No     Comment: Drug use: No     Sexual activity: Not Currently   Other Topics Concern     Parent/sibling w/ CABG, MI or angioplasty before 65F 55M? Not Asked   Social History Narrative    Cigarettes-none.  Alcohol-none.   and resides in [facility].     Social Determinants of Health     Financial Resource Strain: Not on file   Food Insecurity: Not on file   Transportation Needs: Not on  file   Physical Activity: Not on file   Stress: Not on file   Social Connections: Not on file   Intimate Partner Violence: Not on file   Housing Stability: Not on file       Review of Systems   Constitutional: Negative.    Endocrine: Negative.    Skin: Negative.    Allergic/Immunologic: Negative.    Hematological: Negative.        Physical Exam  Vitals and nursing note reviewed.   Constitutional:       General: She is not in acute distress.     Appearance: Normal appearance. She is not ill-appearing, toxic-appearing or diaphoretic.   Cardiovascular:      Rate and Rhythm: Normal rate. Rhythm irregular.   Pulmonary:      Effort: Pulmonary effort is normal.      Breath sounds: Normal breath sounds.   Abdominal:      Palpations: Abdomen is soft.      Tenderness: There is no abdominal tenderness.   Musculoskeletal:      Right lower leg: No edema.      Left lower leg: No edema.   Skin:     General: Skin is warm and dry.   Neurological:      Mental Status: She is alert.         Assessment:      ICD-10-CM    1. Chronic atrial fibrillation (H)  I48.20    2. Type 2 diabetes mellitus without complication, without long-term current use of insulin (H)  E11.9    3. Gastrointestinal hemorrhage, unspecified gastrointestinal hemorrhage type  K92.2        Plan: She appears to be stable after her acute illness and hospitalization.  She appears to have acclimated to her new living situation in the nursing home, so that is helpful as well.  Medications will continue without change.  She recently had a CBC showing that she was still fairly anemic and her white count was somewhat high so this will need to be repeated in about 1 month.  No other changes or issues at this time.  Follow-up will be dependent on her clinical course.  Answers for HPI/ROS submitted by the patient on 1/18/2022  How many minutes a day do you exercise enough to make your heart beat faster?: 9 or less  How many days a week do you exercise enough to make your heart  beat faster?: 3 or less

## 2022-02-01 NOTE — TELEPHONE ENCOUNTER
" [id number] sent Rx request for the following:   nystatin (MYCOSTATIN) 663300 UNIT/GM external powder   Sig APPLY TOPICALLY 3 TIMES DAILY    Last Prescription Date:   12/31/2021  Last Fill Qty/Refills:         45g, R-1    Last Office Visit:              01/18/2022 (Omar)   Future Office visit:           None noted   Antifungal Agents Passed - 2/1/2022 12:38 PM        Passed - Recent (12 mo) or future (30 days) visit within the authorizing provider's specialty     Patient has had an office visit with the authorizing provider or a provider within the authorizing providers department within the previous 12 mos or has a future within next 30 days. See \"Patient Info\" tab in inbasket, or \"Choose Columns\" in Meds & Orders section of the refill encounter.              Passed - Not Fluconazole or Terconazole      If oral Fluconazole or Terconazole, may refill if indicated in progress notes.           Passed - Medication is active on med list           Unable to complete prescription refill per RN Medication Refill Policy.................... Vicki Brown RN ....................  2/1/2022   4:34 PM          "

## 2022-02-03 PROBLEM — K21.9 GASTROESOPHAGEAL REFLUX DISEASE WITHOUT ESOPHAGITIS: Status: ACTIVE | Noted: 2022-01-01

## 2022-02-03 NOTE — PROGRESS NOTES
[facility]  Acute Care Visit    Patient Name: Lety Luna   : 1929  MRN: [id number]    Place of Service: [facility]  DOS: 2/3/2022    CC: nausea    HPI:  Lety Luna is a 89 year old female with PMH of multiple chronic medical concerns, who is seen today regarding pt reports feeling nauseous today and the past few days having some diarrhea. Pt has suffered from prolonged chronic low Mg levels. Omeprazole was stopped due to chronic use and significantly low Mg down to 1.5. Within a week Mg came back up to 1.8 with supplementation.   Today she is reporting nausea and diarrhea. Although she ate 100% of breakfast and is drinking well. She has not had a bowel movement today.   Her bottom is quite raw per nursing staff. Currently getting dimethicone applied but not helping it heal.   Pt has chronic anemia and takes iron supplement. Last Hgb was 9.7 and WBC were up slightly. UA/UC at that time showed mixed toney. She has no respiratory symptoms. Will recheck CBC and BMP next week.     Multidisciplinary notes, laboratory values, medications, vital signs, weight and orders arereviewed from nursing home records. I have reviewed the patient s medical history and updated the computerized patient record.     PMH:  Past Medical History:   Diagnosis Date     Atrial fibrillation (H)     No anticoagulation due to GI bleed     Chronic kidney disease, stage III (moderate) (H)     No Comments Provided     Developmental disorder of scholastic skills     No Comments Provided     Essential (primary) hypertension     No Comments Provided     Gastrointestinal hemorrhage     ,3 units pRBC     Gout     No Comments Provided     Hyperlipidemia     No Comments Provided     Obesity     No Comments Provided     Type 2 diabetes mellitus without complications (H)     No Comments Provided       Medications:  Current Outpatient Medications   Medication Sig Dispense Refill     allopurinol (ZYLOPRIM) 100 MG tablet  TAKE 1 TABLET(100 MG) BY MOUTH DAILY 90 tablet 3     aspirin 81 MG tablet Take 81 mg by mouth daily with food       captopril (CAPOTEN) 50 MG tablet TAKE 1 TABLET(50 MG) BY MOUTH TWICE DAILY 180 tablet 3     diltiazem ER COATED BEADS (CARTIA XT) 240 MG 24 hr capsule Take 1 capsule (240 mg) by mouth daily 90 capsule 3     ferrous sulfate (FEROSUL) 325 (65 Fe) MG tablet Take 1 tablet (325 mg) by mouth daily (with breakfast) 100 tablet 3     magnesium oxide (MAG-OX) 400 MG tablet Take 1 tablet (400 mg) by mouth 2 times daily 30 tablet 0     metoprolol succinate ER (TOPROL-XL) 200 MG 24 hr tablet Take 1 tablet (200 mg) by mouth daily 90 tablet 3     nystatin (MYCOSTATIN) 934649 UNIT/GM external powder APPLY TOPICALLY 3 TIMES DAILY 45 g 1     nystatin-triamcinolone (MYCOLOG) 472885-4.1 UNIT/GM-% external ointment Apply topically 2 times daily 15 g 1     omeprazole (PRILOSEC) 20 MG DR capsule TAKE 1 CAPSULE(20 MG) BY MOUTH DAILY 90 capsule 3     order for DME Urinary incontinence pad / panty liner       Medication reconciliation complete between Epic record and NH MAR.     Allergies:  No Known Allergies    Review of Systems:  +nausea  +diarrhea, but none today  +stomach just feels upset  Denies fever, chills    Vital Signs:  Temp 97.4   Pulse 74   Respirations 18   /50   Oxygen Sats 98%   Weight 157.8#    Physical Exam:     Pleasant and alert with mild distress due to some nausea.    Sclera nonicteric, conjunctiva non-inflamed.  Skin color pink. Mucous membranes moist.   Abdomen soft and without tenderness   Extremities without edema.    Able to move upper and lower extremities       New Labs/Diagnostics:  Magnesium   Date Value Ref Range Status   01/26/2022 1.8 (L) 1.9 - 2.7 mg/dL Final     Last Comprehensive Metabolic Panel:  Sodium   Date Value Ref Range Status   01/10/2022 131 (L) 134 - 144 mmol/L Final   06/08/2021 142 134 - 144 mmol/L Final     Potassium   Date Value Ref Range Status   01/10/2022 4.4 3.5 - 5.1  mmol/L Final   06/08/2021 5.2 (H) 3.5 - 5.1 mmol/L Final     Chloride   Date Value Ref Range Status   01/10/2022 94 (L) 98 - 107 mmol/L Final   06/08/2021 108 (H) 98 - 107 mmol/L Final     Carbon Dioxide   Date Value Ref Range Status   06/08/2021 26 21 - 31 mmol/L Final     Carbon Dioxide (CO2)   Date Value Ref Range Status   01/10/2022 27 21 - 31 mmol/L Final     Anion Gap   Date Value Ref Range Status   01/10/2022 10 3 - 14 mmol/L Final   06/08/2021 8 3 - 14 mmol/L Final     Glucose   Date Value Ref Range Status   01/10/2022 201 (H) 70 - 105 mg/dL Final   06/08/2021 124 (H) 70 - 105 mg/dL Final     Urea Nitrogen   Date Value Ref Range Status   01/10/2022 28 (H) 7 - 25 mg/dL Final   06/08/2021 29 (H) 7 - 25 mg/dL Final     Creatinine   Date Value Ref Range Status   01/10/2022 1.39 (H) 0.60 - 1.20 mg/dL Final   06/08/2021 1.48 (H) 0.60 - 1.20 mg/dL Final     GFR Estimate   Date Value Ref Range Status   01/10/2022 35 (L) >60 mL/min/1.73m2 Final     Comment:     Effective December 21, 2021 eGFRcr in adults is calculated using the 2021 CKD-EPI creatinine equation which includes age and gender (James colindres al., NEJ, DOI: 10.1056/HSPCtb3056982)   06/08/2021 33 (L) >60 mL/min/[1.73_m2] Final     Calcium   Date Value Ref Range Status   01/10/2022 8.6 8.6 - 10.3 mg/dL Final   06/08/2021 8.7 8.6 - 10.3 mg/dL Final     Lab Results   Component Value Date    WBC 13.5 01/10/2022    WBC 7.1 06/08/2021     Lab Results   Component Value Date    RBC 2.93 01/10/2022    RBC 3.27 06/08/2021     Lab Results   Component Value Date    HGB 9.7 01/10/2022    HGB 11.0 06/08/2021     Lab Results   Component Value Date    HCT 29.7 01/10/2022    HCT 33.7 06/08/2021     No components found for: MCT  Lab Results   Component Value Date     01/10/2022     06/08/2021     Lab Results   Component Value Date    MCH 33.1 01/10/2022    MCH 33.6 06/08/2021     Lab Results   Component Value Date    MCHC 32.7 01/10/2022    MCHC 32.6 06/08/2021      Lab Results   Component Value Date    RDW 13.5 01/10/2022    RDW 13.5 06/08/2021     Lab Results   Component Value Date     01/10/2022     06/08/2021     Na level low, chronic anemia, and improved Mg    Assessment/Plan:  (R11.0) Nausea  (primary encounter diagnosis)  (K21.9) Gastroesophageal reflux disease without esophagitis  Comment: omeprazole discontinued 1/11 due to chronic hypomagnesemia  Plan: Mg has increased to 1.9 now but likely having gerd symptoms since omeprazole stopped   Will trial H2 blocker instead---try pepcid 20 mg daily. Monitor if symptoms improve. Update NP if not improving  Check BMP, Mg next week as well as CBC    (E87.1) Hyponatremia  Comment: Na 131  Plan: recheck BMP next week      A total of 32 minutes was spent with this patient, of which more than 50% was spent in counseling and/or coordination of care.     JASSI Mason, CNP....................  2/3/2022   2:36 PM

## 2022-02-09 NOTE — PROGRESS NOTES
[facility]  Acute Care Visit    Patient Name: Lety Luna   : 1929  MRN: [id number]    Place of Service: [facility]  DOS: 2022    CC: diarrhea, dermatitis buttocks, tired    HPI:  Lety Luna is a 89 year old female with PMH of multiple chronic medical concerns, who is seen today regarding pt has been having diarrhea for past 5 days, hx of hypomagnesemia, moisture associated dermatitis on buttocks due to diarrhea and excessive wiping. She reports her buttocks is very sore and gets distressed just thinking about having to be cleaned up as it burns and is very tender.   She overall feels tired due to all the diarrhea. She does continue to eat and drink ok. She denies abd cramping, nausea, vomiting.   She did report some nausea/acid reflux last week and was started on pepcid. This seems to have resolved.   No fever. Vitals have been stable. Weight has been relatively stable.       Multidisciplinary notes, laboratory values, medications, vital signs, weight and orders arereviewed from nursing home records. I have reviewed the patient s medical history and updated the computerized patient record.     PMH:  Past Medical History:   Diagnosis Date     Atrial fibrillation (H)     No anticoagulation due to GI bleed     Chronic kidney disease, stage III (moderate) (H)     No Comments Provided     Developmental disorder of scholastic skills     No Comments Provided     Essential (primary) hypertension     No Comments Provided     Gastrointestinal hemorrhage     ,3 units pRBC     Gout     No Comments Provided     Hyperlipidemia     No Comments Provided     Obesity     No Comments Provided     Type 2 diabetes mellitus without complications (H)     No Comments Provided       Medications:  Current Outpatient Medications   Medication Sig Dispense Refill     allopurinol (ZYLOPRIM) 100 MG tablet TAKE 1 TABLET(100 MG) BY MOUTH DAILY 90 tablet 3     aspirin 81 MG tablet Take 81 mg by mouth daily  "with food       captopril (CAPOTEN) 50 MG tablet TAKE 1 TABLET(50 MG) BY MOUTH TWICE DAILY 180 tablet 3     diltiazem ER COATED BEADS (CARTIA XT) 240 MG 24 hr capsule Take 1 capsule (240 mg) by mouth daily 90 capsule 3     famotidine (PEPCID) 20 MG tablet Take 1 tablet (20 mg) by mouth daily       ferrous sulfate (FEROSUL) 325 (65 Fe) MG tablet Take 1 tablet (325 mg) by mouth daily (with breakfast) 100 tablet 3     Magnesium Oxide 250 MG TABS Take 1 tablet (250 mg) by mouth 2 times daily       metoprolol succinate ER (TOPROL-XL) 200 MG 24 hr tablet Take 1 tablet (200 mg) by mouth daily 90 tablet 3     nystatin (MYCOSTATIN) 496021 UNIT/GM external powder APPLY TOPICALLY 3 TIMES DAILY 45 g 1     nystatin-triamcinolone (MYCOLOG) 400181-1.1 UNIT/GM-% external ointment Apply topically 2 times daily 15 g 1     order for DME Urinary incontinence pad / panty liner       Medication reconciliation complete between Epic record and NH MAR.     Allergies:  No Known Allergies    Review of Systems:  +pain when skin on buttocks is touched, cleansed  Denies abd pain, tenderness, nausea, vomiting  +diarrhea  +feels very tired  Eating and drinking well  Denies dizziness/light-headedness  Denies chest pain, shortness of breath, cough    Vital Signs:  Temp 97.7   Pulse 74   Respirations 17   /72   Oxygen Sats 96%   Weight 163.2#    Physical Exam:     Pleasant and alert without distress. \"I'm fine. I'm just tired\"   Sclera nonicteric, conjunctiva non-inflamed.  Skin color pink.  Neck supple and without adenopathy   Lungs clear to auscultation throughout. No wheezes or rales noted.   Cardiovascular regular, S1, S2 auscultated. No murmur noted.  Abdomen soft and without tenderness   Extremities without edema.   Able to move upper and lower extremities   Buttocks area with moisture associated dermatitis, raw, with some open areas.       New Labs/Diagnostics:  Magnesium   Date Value Ref Range Status   02/07/2022 2.3 1.9 - 2.7 mg/dL " Final     Lab Results   Component Value Date    WBC 8.3 02/07/2022    WBC 7.1 06/08/2021     Lab Results   Component Value Date    RBC 3.49 02/07/2022    RBC 3.27 06/08/2021     Lab Results   Component Value Date    HGB 11.5 02/07/2022    HGB 11.0 06/08/2021     Lab Results   Component Value Date    HCT 35.3 02/07/2022    HCT 33.7 06/08/2021     No components found for: MCT  Lab Results   Component Value Date     02/07/2022     06/08/2021     Lab Results   Component Value Date    MCH 33.0 02/07/2022    MCH 33.6 06/08/2021     Lab Results   Component Value Date    MCHC 32.6 02/07/2022    MCHC 32.6 06/08/2021     Lab Results   Component Value Date    RDW 13.2 02/07/2022    RDW 13.5 06/08/2021     Lab Results   Component Value Date     02/07/2022     06/08/2021     Last Comprehensive Metabolic Panel:  Sodium   Date Value Ref Range Status   02/07/2022 135 134 - 144 mmol/L Final   06/08/2021 142 134 - 144 mmol/L Final     Potassium   Date Value Ref Range Status   02/07/2022 4.2 3.5 - 5.1 mmol/L Final   06/08/2021 5.2 (H) 3.5 - 5.1 mmol/L Final     Chloride   Date Value Ref Range Status   02/07/2022 94 (L) 98 - 107 mmol/L Final   06/08/2021 108 (H) 98 - 107 mmol/L Final     Carbon Dioxide   Date Value Ref Range Status   06/08/2021 26 21 - 31 mmol/L Final     Carbon Dioxide (CO2)   Date Value Ref Range Status   02/07/2022 35 (H) 21 - 31 mmol/L Final     Anion Gap   Date Value Ref Range Status   02/07/2022 6 3 - 14 mmol/L Final   06/08/2021 8 3 - 14 mmol/L Final     Glucose   Date Value Ref Range Status   02/07/2022 171 (H) 70 - 105 mg/dL Final   06/08/2021 124 (H) 70 - 105 mg/dL Final     Urea Nitrogen   Date Value Ref Range Status   02/07/2022 33 (H) 7 - 25 mg/dL Final   06/08/2021 29 (H) 7 - 25 mg/dL Final     Creatinine   Date Value Ref Range Status   02/07/2022 1.64 (H) 0.60 - 1.20 mg/dL Final   06/08/2021 1.48 (H) 0.60 - 1.20 mg/dL Final     GFR Estimate   Date Value Ref Range Status    02/07/2022 29 (L) >60 mL/min/1.73m2 Final     Comment:     Effective December 21, 2021 eGFRcr in adults is calculated using the 2021 CKD-EPI creatinine equation which includes age and gender (James colindres al., NEJM, DOI: 10.1056/JSXBci5092463)   06/08/2021 33 (L) >60 mL/min/[1.73_m2] Final     Calcium   Date Value Ref Range Status   02/07/2022 8.9 8.6 - 10.3 mg/dL Final   06/08/2021 8.7 8.6 - 10.3 mg/dL Final     Mg back to normal. Will stop Mg supplement.   Hgb improving.   Slight dehydration due to increased diarrhea. Push fluids    Assessment/Plan:  (R19.7) Diarrhea, unspecified type  (primary encounter diagnosis)  Comment: acute, contributing to moisture associated dermatitis on buttocks  Plan: check for cdiff, check Mg, BMP, CBC  Change Mg oxide to Slow Mag    (E83.42) Hypomagnesemia  Comment: Mg has been chronically low, overall improving but now ongoing issues with diarrhea  Plan: Check Mg, change mg oxide to slow mag    (L30.9) Dermatitis  Comment: moisture associated due to diarrhea, very raw and sore  Plan: Stop current creams to buttocks (have tried dimethicone, calzinc without improvement)  Trial aloe vesta 3 barrier cream-apply small amount TID and PRN    After review of labs:   Mg back to normal. Will stop Mg supplement.   Hgb improving.   Slight dehydration due to increased diarrhea. Push fluids. Await results for cdiff. If neg will start immodium.       A total of 40 minutes was spent with this patient, of which more than 50% was spent in counseling and/or coordination of care.     JASSI Mason, CNP ....................  2/8/2022   6:41 PM

## 2022-02-20 NOTE — PROGRESS NOTES
[facility]   LT  60 DAY RECERTIFICATION    Patient Name: Lety Luna  YOB: 1929 Age: 89 year old  Medical RecordNumber: [id number]  Primary Provider: Dr Nelson    Advanced Directives: DNR selective treatment    Date admitted to [facility]: 12/2021      HPI: Patient is seen today for 60 day evaluation for the following medical issues:     Gastroesophageal reflux disease without esophagitis  Type 2 diabetes mellitus without complication, without long-term current use of insulin (H)  Primary hypertension  Pressure injury of buttock, stage 2, unspecified laterality (H)  Stage 3b chronic kidney disease (H)    Pt has settled in to [facility]. She was admitted end of Dec. She has chronically struggled with low magnesium levels and was likely due to chronic use of omeprazole. This was stopped but then pt started having nausea/heartburn. Pepcid was started about 2 weeks ago and nausea has resolved and Mg has normalized.   She was having bouts of diarrhea which was most likely from Mg supplement despite switching to Slow Mg. Now that Mg is normal the supplement was stopped and diarrhea resolved.   She had severe MASD on buttocks with small open areas and quite painful for pt every time she needed to be cleaned. Dimethicone cream and calzinc cream did not help. Aloe vesta cream was started and skin dermatitis resolved.   She is feeling well today. She does state she would like an eye exam as she thinks her eye glass prescription could be stronger. She also has difficult hearing and wears hearing aides but nursing staff report they seem too big for her ears and they often cause her soreness after wearing for a while and difficult to get in her ears due to large size.     She has CKD. Will cont to monitor this and be mindful of medications that could be nephrotoxic.     Pt has hx of ESBL in urine. She is in isolation because of this. Facility is requesting for repeat UA to test for ESBL  and see if can take off isolation. She is currently having no UTI symptoms.     BP ranging 100-130s. HR 70-80s.  She does take captopril, diltiazem, and metoprolol.     Blood sugars are checked BID ranging 130-170s. Before breakfast and before supper. Occasionally up to low 200s. This is diet controlled. Last A1c was 6.4 in Nov 2021.     ALLERGIES:  No Known Allergies    Past Medical History:    has a past medical history of Atrial fibrillation (H), Chronic kidney disease, stage III (moderate) (H), Developmental disorder of scholastic skills, Essential (primary) hypertension, Gastrointestinal hemorrhage, Gout, Hyperlipidemia, Obesity, and Type 2 diabetes mellitus without complications (H).    Past Surgical History:   has a past surgical history that includes Appendectomy open; Dilation and curettage; Laparoscopic tubal ligation; and Extracapsular cataract extration with intraocular lens implant (Bilateral).    Family History:  family history includes Diabetes in her father, mother, sister, sister, and son; Heart Disease in her brother, brother, father, and mother; Hypertension in her brother; Other - See Comments in her brother and sister.    Social History:   reports that she has never smoked. She has never used smokeless tobacco. She reports that she does not drink alcohol and does not use drugs.    Immunizations:   Immunization History   Administered Date(s) Administered     COVID-19,AIDAPfizer (12+ Yrs) 02/16/2021, 03/09/2021, 10/05/2021     Flu 65+ Years 10/03/2017     Flu, Unspecified 10/15/2009, 10/08/2010, 09/21/2011     Influenza (H1N1) 11/24/2009     Influenza (High Dose) 3 valent vaccine 10/03/2018, 10/03/2018     Influenza (IIV3) PF 11/10/2006, 10/17/2007, 10/22/2008, 09/21/2011, 09/24/2012, 10/25/2013     Influenza Vaccine IM > 6 months Valent IIV4 (Alfuria,Fluzone) 09/23/2014, 10/12/2015, 10/04/2016, 10/16/2019     Influenza, Quad, High Dose, Pf, 65yr+ (Fluzone HD) 10/07/2020, 10/05/2021     Pneumo Conj  13-V (2010&after) 02/18/2016     Pneumococcal 23 valent 04/15/1994, 02/07/2013     TDAP Vaccine (Adacel) 02/07/2013       Current Medications:   Current Outpatient Medications   Medication     allopurinol (ZYLOPRIM) 100 MG tablet     Aloe Vesta 2-n-1 Protective external ointment     aspirin 81 MG tablet     captopril (CAPOTEN) 50 MG tablet     diltiazem ER COATED BEADS (CARTIA XT) 240 MG 24 hr capsule     famotidine (PEPCID) 20 MG tablet     ferrous sulfate (FEROSUL) 325 (65 Fe) MG tablet     metoprolol succinate ER (TOPROL-XL) 200 MG 24 hr tablet     nystatin (MYCOSTATIN) 846499 UNIT/GM external powder     nystatin-triamcinolone (MYCOLOG) 832908-3.1 UNIT/GM-% external ointment     order for DME     No current facility-administered medications for this visit.     Medication reconciliation completed between [facility] record and NH MAR.     Diagnostics/Labs (reviewed today):  Magnesium   Date Value Ref Range Status   02/07/2022 2.3 1.9 - 2.7 mg/dL Final     Lab Results   Component Value Date    WBC 8.3 02/07/2022    WBC 7.1 06/08/2021     Lab Results   Component Value Date    RBC 3.49 02/07/2022    RBC 3.27 06/08/2021     Lab Results   Component Value Date    HGB 11.5 02/07/2022    HGB 11.0 06/08/2021     Lab Results   Component Value Date    HCT 35.3 02/07/2022    HCT 33.7 06/08/2021     No components found for: MCT  Lab Results   Component Value Date     02/07/2022     06/08/2021     Lab Results   Component Value Date    MCH 33.0 02/07/2022    MCH 33.6 06/08/2021     Lab Results   Component Value Date    MCHC 32.6 02/07/2022    MCHC 32.6 06/08/2021     Lab Results   Component Value Date    RDW 13.2 02/07/2022    RDW 13.5 06/08/2021     Lab Results   Component Value Date     02/07/2022     06/08/2021     Last Comprehensive Metabolic Panel:  Sodium   Date Value Ref Range Status   02/07/2022 135 134 - 144 mmol/L Final   06/08/2021 142 134 - 144 mmol/L Final     Potassium   Date Value Ref Range  Status   02/07/2022 4.2 3.5 - 5.1 mmol/L Final   06/08/2021 5.2 (H) 3.5 - 5.1 mmol/L Final     Chloride   Date Value Ref Range Status   02/07/2022 94 (L) 98 - 107 mmol/L Final   06/08/2021 108 (H) 98 - 107 mmol/L Final     Carbon Dioxide   Date Value Ref Range Status   06/08/2021 26 21 - 31 mmol/L Final     Carbon Dioxide (CO2)   Date Value Ref Range Status   02/07/2022 35 (H) 21 - 31 mmol/L Final     Anion Gap   Date Value Ref Range Status   02/07/2022 6 3 - 14 mmol/L Final   06/08/2021 8 3 - 14 mmol/L Final     Glucose   Date Value Ref Range Status   02/07/2022 171 (H) 70 - 105 mg/dL Final   06/08/2021 124 (H) 70 - 105 mg/dL Final     Urea Nitrogen   Date Value Ref Range Status   02/07/2022 33 (H) 7 - 25 mg/dL Final   06/08/2021 29 (H) 7 - 25 mg/dL Final     Creatinine   Date Value Ref Range Status   02/07/2022 1.64 (H) 0.60 - 1.20 mg/dL Final   06/08/2021 1.48 (H) 0.60 - 1.20 mg/dL Final     GFR Estimate   Date Value Ref Range Status   02/07/2022 29 (L) >60 mL/min/1.73m2 Final     Comment:     Effective December 21, 2021 eGFRcr in adults is calculated using the 2021 CKD-EPI creatinine equation which includes age and gender (James et al., NEJM, DOI: 10.1056/ZZROwv7635519)   06/08/2021 33 (L) >60 mL/min/[1.73_m2] Final     Calcium   Date Value Ref Range Status   02/07/2022 8.9 8.6 - 10.3 mg/dL Final   06/08/2021 8.7 8.6 - 10.3 mg/dL Final     Bilirubin Total   Date Value Ref Range Status   02/07/2022 0.3 0.3 - 1.0 mg/dL Final   06/08/2021 0.5 0.3 - 1.0 mg/dL Final     Alkaline Phosphatase   Date Value Ref Range Status   02/07/2022 57 34 - 104 U/L Final   06/08/2021 49 34 - 104 U/L Final     ALT   Date Value Ref Range Status   02/07/2022 14 7 - 52 U/L Final   06/08/2021 6 (L) 7 - 52 U/L Final     AST   Date Value Ref Range Status   02/07/2022 15 13 - 39 U/L Final   06/08/2021 10 (L) 13 - 39 U/L Final     CKD stable. Mg now normal after stopping omeprazole.     CURRENTLYENROLLED IN: Restorative but often  refuses    Current Diet: Reg diet    Review Of Systems:    Denies heartburn or nausea  +vision impaired-wants new eyeglasses  +poor hearing-wants her hearing aides checked  Denies chest pain, cough, shortness of breath  Denies trouble with bowels and bladder  Denies pain      Vital Signs: (obtained from nursing home record)  Temp 97.1   Pulse 85   Resp 17   /68   O2 Sats 94%   Weight 162.2 #     Objective:     Pleasant and alert without distress.    Sclera nonicteric, conjunctiva non-inflamed.  Skin color pink. Mucous membranes moist.  Skin under breasts slightly red, moist.    Neck supple and without adenopathy   Lungs clear to auscultation throughout. No wheezes or rales noted.   Cardiovascular irregular, No murmur noted.  Abdomen soft and without tenderness   Extremities without edema.    Able to move upper and lower extremities       Assessment and Plan:  (I10) Primary hypertension  (primary encounter diagnosis)  Comment: stable ranging 100-130s  Plan: cont captopril, diltiazem, metoprolol    (K21.9) Gastroesophageal reflux disease without esophagitis  Comment: improved  Plan: cont pepcid    (E11.9) Type 2 diabetes mellitus without complication, without long-term current use of insulin (H)  Comment: stable, diet controlled  Plan: cont diet, monitor blood sugars    (L89.302) Pressure injury of buttock, stage 2, unspecified laterality (H)  Comment: improved, almost completely healed  Plan: cont aloe vesta BID for skin protection    (N18.32) Stage 3b chronic kidney disease (H)  Comment: stable, chronic  Plan: cont to monitor, avoid nephrotoxic meds, control BP and blood sugars.     Family is present during visit today. Questions answered.     Multidisciplinary notes, laboratory values, medications, vital signs, weight and orders are reviewed from nursing home records. I have reviewed the patient s medical history and updated the computerized patient record.     A total of 50 minutes was spent with this  patient, of which more than 50% was spent in counseling and/or coordination of care.   JASSI Mason, CNP  2/19/2022 7:38 PM

## 2022-03-08 NOTE — PROGRESS NOTES
[facility]  Acute Care Visit    Patient Name: Lety Luna   : 1929  MRN: [id number]    Place of Service: [facility]  DOS: 3/3/2022    CC: rash under breasts not improving with nystatin    HPI:  Lety Luna is a 89 year old female with PMH of multiple chronic medical concerns, who is seen today regarding rash under breasts has been there since admission to [facility] a few weeks ago. She has been using nystatin powder with minimal improvement.   Pt reports skin is sore and irritated.       Multidisciplinary notes, laboratory values, medications, vital signs, weight and orders arereviewed from nursing home records. I have reviewed the patient s medical history and updated the computerized patient record.     PMH:  Past Medical History:   Diagnosis Date     Atrial fibrillation (H)     No anticoagulation due to GI bleed     Chronic kidney disease, stage III (moderate) (H)     No Comments Provided     Developmental disorder of scholastic skills     No Comments Provided     Essential (primary) hypertension     No Comments Provided     Gastrointestinal hemorrhage     ,3 units pRBC     Gout     No Comments Provided     Hyperlipidemia     No Comments Provided     Obesity     No Comments Provided     Type 2 diabetes mellitus without complications (H)     No Comments Provided       Medications:  Current Outpatient Medications   Medication Sig Dispense Refill     allopurinol (ZYLOPRIM) 100 MG tablet TAKE 1 TABLET(100 MG) BY MOUTH DAILY 90 tablet 3     Aloe Vesta 2-n-1 Protective external ointment Apply to buttocks TID and PRN until healed (moisture associated dermatitis)       aspirin 81 MG tablet Take 81 mg by mouth daily with food       captopril (CAPOTEN) 50 MG tablet TAKE 1 TABLET(50 MG) BY MOUTH TWICE DAILY 180 tablet 3     diltiazem ER COATED BEADS (CARTIA XT) 240 MG 24 hr capsule Take 1 capsule (240 mg) by mouth daily 90 capsule 3     famotidine (PEPCID) 20 MG tablet Take 1 tablet  (20 mg) by mouth daily       ferrous sulfate (FEROSUL) 325 (65 Fe) MG tablet Take 1 tablet (325 mg) by mouth daily (with breakfast) 100 tablet 3     metoprolol succinate ER (TOPROL-XL) 200 MG 24 hr tablet Take 1 tablet (200 mg) by mouth daily 90 tablet 3     nystatin (MYCOSTATIN) 179448 UNIT/GM external powder APPLY TOPICALLY 3 TIMES DAILY 45 g 1     nystatin-triamcinolone (MYCOLOG) 743387-5.1 UNIT/GM-% external ointment Apply topically 2 times daily 15 g 1     order for DME Urinary incontinence pad / panty liner       Medication reconciliation complete between Epic record and NH MAR.     Allergies:  No Known Allergies    Review of Systems:  +sore under breasts  Denies fever      Vital Signs:  Temp 96.7   Pulse 63   Respirations 18   /69   Oxygen Sats 99%   Weight 167.4#    Physical Exam:     Pleasant and alert without distress. Hard of hearing.    Sclera nonicteric, conjunctiva non-inflamed. Wears glasses  Skin color pink.   Under breasts bilateral red, moist, irritated and sore.   Abdomen soft and without tenderness   Extremities without edema.   Able to move upper and lower extremities       Assessment/Plan:  (B37.9) Candida infection  (primary encounter diagnosis)  (L30.9) Dermatitis  Comment: under breasts moisture related dermatitis  Plan: cleanse with soap and water, pat dry, apply triamcinolone 0.1% and terbinafine 1% cream and then apply interdry cloths to keep dry. Apply creams BID for 7 days or until healed.         A total of 32 minutes was spent with this patient, of which more than 50% was spent in counseling and/or coordination of care.     JASSI Mason, CNP ....................  3/8/2022   3:44 PM

## 2022-03-08 NOTE — NURSING NOTE
"Chief Complaint   Patient presents with     RE-CERT     Nursing Home       Initial /69   Pulse 72   Temp 97.7  F (36.5  C) (Tympanic)   Resp 17   Wt 75.9 kg (167 lb 6.4 oz)   SpO2 97%   Breastfeeding No   BMI 31.63 kg/m   Estimated body mass index is 31.63 kg/m  as calculated from the following:    Height as of 12/28/21: 1.549 m (5' 1\").    Weight as of this encounter: 75.9 kg (167 lb 6.4 oz).  FOOD SECURITY SCREENING QUESTIONS  Hunger Vital Signs:  Within the past 12 months we worried whether our food would run out before we got money to buy more. Never  Within the past 12 months the food we bought just didn't last and we didn't have money to get more. Never        Brijesh Scott, WEN    "

## 2022-03-08 NOTE — PROGRESS NOTES
"Lety is a 89 year old who is being evaluated via a billable video visit.      How would you like to obtain your AVS? MyChart  If the video visit is dropped, the invitation should be resent by:   Will anyone else be joining your video visit? No    Video Start Time: 1:20    Assessment & Plan   Problem List Items Addressed This Visit        Endocrine    Diabetes mellitus, type II (H) - Primary       Circulatory    Atrial fibrillation (H)       Urinary    Urinary tract infection with pyuria         She appears to be doing relatively well at this time and everything is generally stable.  Because of her ESBL urine infection she is still on isolation.  She apparently just had treatment and we will do a follow-up urine culture at this point in time.  She is apparently asymptomatic.  In regards to her diabetes, she is in good control historically and will need another A1c in May if all goes well.  She should also have a CBC checked at that time for chronic anemia associated with her chronic GI bleed.  No other changes or treatments needed at this time.    30 minutes spent on the date of the encounter doing chart review, review of test results, interpretation of tests, patient visit and documentation        BMI:   Estimated body mass index is 31.63 kg/m  as calculated from the following:    Height as of 12/28/21: 1.549 m (5' 1\").    Weight as of this encounter: 75.9 kg (167 lb 6.4 oz).           No follow-ups on file.    ANASTASIIA ARGUETA MD  [facility]    Subjective   Lety is a 89 year old who presents for the following health issues     HPI     Video visit was held with the patient today.  She is in the nursing home and has been there for a number of months.  Overall, things have been going well.  She has been troubled with recurrent urinary tract infections including ESBL.  She had culture done on February 19 and apparently had treatment with antibiotics but did not have a follow-up.  Her urine has " cleared since the antibiotics.  She is still on some isolation precautions because of her previous infection so will be worthwhile to ensure and document clearance of this.    She is otherwise doing well.  She participates in activities and is social.  Her diabetes has been fine.  She has chronic atrial fibrillation but is not anticoagulated due to chronic GI bleeding that she has refused evaluation for.  Generally her hemoglobin has remained stable throughout.    Medications are reviewed.  Treatments and plan of care reviewed.  Previous lab work and notes are reviewed.      Current Outpatient Medications   Medication     allopurinol (ZYLOPRIM) 100 MG tablet     Aloe Vesta 2-n-1 Protective external ointment     aspirin 81 MG tablet     captopril (CAPOTEN) 50 MG tablet     diltiazem ER COATED BEADS (CARTIA XT) 240 MG 24 hr capsule     famotidine (PEPCID) 20 MG tablet     ferrous sulfate (FEROSUL) 325 (65 Fe) MG tablet     metoprolol succinate ER (TOPROL-XL) 200 MG 24 hr tablet     nystatin (MYCOSTATIN) 011189 UNIT/GM external powder     nystatin-triamcinolone (MYCOLOG) 527207-8.1 UNIT/GM-% external ointment     order for DME     No current facility-administered medications for this visit.         Review of Systems   Constitutional, HEENT, cardiovascular, pulmonary, gi and gu systems are negative, except as otherwise noted.      Objective    Vitals - Patient Reported  Pain Score: No Pain (0)        Physical Exam   GENERAL: Healthy, alert and no distress  EYES: Eyes grossly normal to inspection.  No discharge or erythema, or obvious scleral/conjunctival abnormalities.  RESP: No audible wheeze, cough, or visible cyanosis.  No visible retractions or increased work of breathing.    SKIN: Visible skin clear. No significant rash, abnormal pigmentation or lesions.  NEURO: Cranial nerves grossly intact.  Mentation and speech appropriate for age.  PSYCH: Mentation appears normal, affect normal/bright, judgement and insight  intact, normal speech and appearance well-groomed.                Video-Visit Details    Type of service:  Video Visit    Video End Time:1:34 PM    Originating Location (pt. Location): Long term Care    Distant Location (provider location):  [facility] AND [facility]     Platform used for Video Visit: CorneliusWell

## 2022-03-11 NOTE — TELEPHONE ENCOUNTER
Contacted  regarding follow up on urine culture results obtained on 03/09/22 and resulting on 03/11/22. Informed of Providers response-see below. Verbal given and read back by ABBY Agrawal. RN verbalized understanding. No question/concerns voiced. Linda Sanches RN on 3/11/2022 at 12:50 PM       Diana Johnson NP   3/11/2022 12:36 PM CST Back to [facility]        Sent in Abx. F/up next week if not improved.    Diana Johnson NP   3/11/2022 12:36 PM CST         Will treat with Augmentin 875/125 mg BID x 3 days. Follow up if not improved next week.         amoxicillin-clavulanate (AUGMENTIN) 875-125 MG tablet 6 tablet 0 3/11/2022 3/14/2022 --   Sig - Route: Take 1 tablet by mouth 2 times daily for 3 days - Oral   Sent to pharmacy as: Amoxicillin-Pot Clavulanate 875-125 MG Oral Tablet (AUGMENTIN)   Class: E-Prescribe   Order: [id number]   E-Prescribing Status: Receipt confirmed by pharmacy (3/11/2022 12:37 PM CST)       [id number] - [place], [place] - [address]

## 2022-04-25 NOTE — PROGRESS NOTES
Otolaryngology Consultation    Patient: Lety Luna  : 1929    Patient presents with:  Cerumen Impaction: Referred by Carolin Pleitez for an ear cleaning.      HPI:  Lety Luna is a 89 year old female seen today for ear cleaning. Patient was seen in audiology and was noted to have cerumen impactions.   Lety presents with son and daughter in law.   She has no otalgia. Hx of Right TM perforation.   Decreased/ poor hearing.     Denies otologic surgeries.   Denies otorrhea.  Denies worrisome tinnitus.  Denies fluctuating hearing loss or tinnitus.   Denies facial paraesthesia.        Audiogram- 22   Tympanograms- Type A left. Type B Right. (perforation)  Thresholds are right profound rising to moderate loss sloping to a profound possible mixed loss. Left thresholds are severe SNHL.   Poor WRS scores.   Current Outpatient Rx   Medication Sig Dispense Refill     allopurinol (ZYLOPRIM) 100 MG tablet TAKE 1 TABLET(100 MG) BY MOUTH DAILY 90 tablet 3     Aloe Vesta 2-n-1 Protective external ointment Apply to buttocks TID and PRN until healed (moisture associated dermatitis)       aspirin 81 MG tablet Take 81 mg by mouth daily with food       captopril (CAPOTEN) 50 MG tablet TAKE 1 TABLET(50 MG) BY MOUTH TWICE DAILY 180 tablet 3     diltiazem ER COATED BEADS (CARTIA XT) 240 MG 24 hr capsule Take 1 capsule (240 mg) by mouth daily 90 capsule 3     famotidine (PEPCID) 20 MG tablet Take 1 tablet (20 mg) by mouth daily       ferrous sulfate (FEROSUL) 325 (65 Fe) MG tablet Take 1 tablet (325 mg) by mouth daily (with breakfast) 100 tablet 3     metoprolol succinate ER (TOPROL-XL) 200 MG 24 hr tablet Take 1 tablet (200 mg) by mouth daily 90 tablet 3     nystatin (MYCOSTATIN) 446597 UNIT/GM external powder APPLY TOPICALLY 3 TIMES DAILY 45 g 1     nystatin-triamcinolone (MYCOLOG) 331202-6.1 UNIT/GM-% external ointment Apply topically 2 times daily 15 g 1     order for DME Urinary incontinence pad / panty liner    "      Allergies: Patient has no known allergies.     Past Medical History:   Diagnosis Date     Atrial fibrillation (H)     No anticoagulation due to GI bleed     Chronic kidney disease, stage III (moderate) (H)     No Comments Provided     Developmental disorder of scholastic skills     No Comments Provided     Essential (primary) hypertension     No Comments Provided     Gastrointestinal hemorrhage     2012,3 units pRBC     Gout     No Comments Provided     Hyperlipidemia     No Comments Provided     Obesity     No Comments Provided     Type 2 diabetes mellitus without complications (H)     No Comments Provided       Past Surgical History:   Procedure Laterality Date     APPENDECTOMY OPEN       DILATION AND CURETTAGE       EXTRACAPSULAR CATARACT EXTRATION WITH INTRAOCULAR LENS IMPLANT Bilateral      LAPAROSCOPIC TUBAL LIGATION         ENT family history reviewed    Social History     Tobacco Use     Smoking status: Never Smoker     Smokeless tobacco: Never Used   Vaping Use     Vaping Use: Never used   Substance Use Topics     Alcohol use: No     Drug use: No     Comment: Drug use: No       Review of Systems  ROS: 10 point ROS neg other than the symptoms noted above in the HPI     Physical Exam  /60 (BP Location: Right arm)   Pulse 53   Temp 97.8  F (36.6  C) (Tympanic)   Ht 1.549 m (5' 1\")   Wt 73 kg (161 lb)   SpO2 95%   BMI 30.42 kg/m    General - The patient is well nourished and well developed. Very [place] even with aids. She provides answers and family does assist.   Head and Face - Normocephalic and atraumatic, with no gross asymmetry noted.  The facial nerve is intact, with strong symmetric movements.  Voice and Breathing - The patient was breathing comfortably without the use of accessory muscles. There was no wheezing, stridor, or stertor.  The patients voice was clear and strong, and had appropriate pitch and quality.    On examination of the ears, I found that the ear(s) were completely " impacted with dense cerumen.  Therefore, I positioned them in the wheelchair, beginning with the right side.  I used the binocular surgical microscope to perform cerumen removal.  I began by using a cerumen loop to gently lift the edges of the cerumen mass away from the walls of the external canal.  I was able to remove the cerumen with forceps.  The tympanic membrane was anterior superior quadrant, 20% perforation. Me appeared dry.     I turned my attention to the contralateral side once again using the binocular surgical microscope to perform cerumen removal.  I began by using a cerumen loop to gently lift the edges of the cerumen mass away from the walls of the external canal.  I was able to remove cerumen with cupped forceps. The tympanic membrane was intact, no sign of perforation or middle ear effusion.    Mouth - Examination of the oral cavity showed pink, healthy oral mucosa. No lesions or ulcerations noted.  The tongue was mobile and midline, and the dentition was plates.   Throat - The walls of the oropharynx were smooth, pink, moist, symmetric, and had no lesions or ulcerations.  The tonsillar pillars and soft palate were symmetric.  The uvula was midline on elevation.    Neck - Normal midline excursion of the laryngotracheal complex during swallowing.  Full range of motion on passive movement.  Palpation of the occipital, submental, submandibular, internal jugular chain, and supraclavicular nodes did not demonstrate any abnormal lymph nodes or masses.  Palpation of the thyroid was soft and smooth, with no nodules or goiter appreciated.  The trachea was mobile and midline.  Nose - External contour is symmetric, no gross deflection or scars.  Nasal mucosa is pink and moist with no abnormal mucus.        Impression and Plan- Lety Luna is a 89 year old female with:    ICD-10-CM    1. Excessive cerumen in both ear canals  H61.23    2. Perforation of tympanic membrane, right  H72.91    3. 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 Sensorineural hearing loss (SNHL) of left ear with restricted hearing of right ear  H90.A22        Ears were cleaned.   Right ear with perforation  Left ear appears without perforation.   May proceed with hearing aid consult  Medical clearance for HA provided.     Follow up in 1 year or as needed for ear cleaning.     Strict water precautions due to perforation.      The ears were cleaned today.  The patient may return here as needed or with their primary physician.  Aural hygiene was discussed and brochure was given to the patient highlighting care of the ear canal.  Avoidance of Q-tips was reiterated.   Dry ear precautions were also reviewed.      Emily Mckeon PA-C  ENT  [facility]

## 2022-04-27 NOTE — PATIENT INSTRUCTIONS
Ears were cleaned.   Right ear with perforation  Left ear appears without perforation.   May proceed with hearing aid consult  Follow up in 1 year or as needed for ear cleaning.       Thank you for allowing Emily Mckeon PA-C and our ENT team to participate in your care.  If your medications are too expensive, please give the nurse a call.  We can possibly change this medication.  If you have a scheduling or an appointment question please contact our Health Unit Coordinator at [phone], Ext. [phone].    ALL nursing questions or concerns can be directed to your ENT nurse at: [phone] Jordyn

## 2022-04-27 NOTE — NURSING NOTE
"Chief Complaint   Patient presents with     Cerumen Impaction     Referred by Carolin Pleitez for an ear cleaning.       Initial /60 (BP Location: Right arm)   Pulse 53   Temp 97.8  F (36.6  C) (Tympanic)   Ht 1.549 m (5' 1\")   Wt 73 kg (161 lb)   SpO2 95%   BMI 30.42 kg/m   Estimated body mass index is 30.42 kg/m  as calculated from the following:    Height as of this encounter: 1.549 m (5' 1\").    Weight as of this encounter: 73 kg (161 lb).  Medication Reconciliation: complete  Jordyn Zelaya LPN    "

## 2022-05-03 PROBLEM — H72.91 PERFORATION OF TYMPANIC MEMBRANE, RIGHT: Status: ACTIVE | Noted: 2022-01-01

## 2022-05-03 PROBLEM — H61.23 BILATERAL IMPACTED CERUMEN: Status: ACTIVE | Noted: 2022-01-01

## 2022-05-03 NOTE — PROGRESS NOTES
[facility]  60 day recertification    Patient Name: Lety Luna   : 1929  MRN: [id number]    Place of Service: [facility]  DOS: 2022    CC: 60 day recertification    HPI:  Lety Luna is a 89 year old female with PMH of multiple chronic medical concerns, who is seen today regarding    Type 2 diabetes mellitus without complication, without long-term current use of insulin (H)  Gastroesophageal reflux disease without esophagitis  Primary hypertension  Pressure injury of buttock, stage 2, unspecified laterality (H)  Bilateral impacted cerumen  Perforation of tympanic membrane, right  Sinus bradycardia    Pt is sad today and states she misses her son and that she never gets to talk to him. Nursing staff report she has visitors almost daily but it is unclear whether this is her son that comes or a brother/sister-in-law.   Recent visit with ENT Dr Mckeon who cleared bilateral cerumen impaction and found right TM to be mildly perforated. She will continue getting fit for hearing aids but to keep water out of right ear until healed.     Pt continues in isolation for ESBL in urine. She is asymptomatic.     Blood sugars are controlled ranging 120-160s, with occasional in 200s  BPs 110-130s  HRs 50-70s with noted bradycardia  Oxygenation mid to high 90s on room air.  Weight is noted to be down 10# since .       She was weaned off omeprazole due to chronic low Mg. She is now on pepcid and no GI symptoms or heartburn.     Multidisciplinary notes, laboratory values, medications, vital signs, weight and orders arereviewed from nursing home records. I have reviewed the patient s medical history and updated the computerized patient record.     PMH:  Past Medical History:   Diagnosis Date     Atrial fibrillation (H)     No anticoagulation due to GI bleed     Chronic kidney disease, stage III (moderate) (H)     No Comments Provided     Developmental disorder of scholastic skills     No Comments  Provided     Essential (primary) hypertension     No Comments Provided     Gastrointestinal hemorrhage     2012,3 units pRBC     Gout     No Comments Provided     Hyperlipidemia     No Comments Provided     Obesity     No Comments Provided     Type 2 diabetes mellitus without complications (H)     No Comments Provided       Medications:  Current Outpatient Medications   Medication Sig Dispense Refill     acetaminophen (TYLENOL) 325 MG tablet Take 650 mg by mouth every 4 hours as needed for mild pain       allopurinol (ZYLOPRIM) 100 MG tablet TAKE 1 TABLET(100 MG) BY MOUTH DAILY 90 tablet 3     Aloe Vesta 2-n-1 Protective external ointment Apply to buttocks TID and PRN until healed (moisture associated dermatitis)       aspirin 81 MG tablet Take 81 mg by mouth daily with food       captopril (CAPOTEN) 50 MG tablet TAKE 1 TABLET(50 MG) BY MOUTH TWICE DAILY 180 tablet 3     diltiazem ER COATED BEADS (CARTIA XT) 240 MG 24 hr capsule Take 1 capsule (240 mg) by mouth daily 90 capsule 3     famotidine (PEPCID) 20 MG tablet Take 1 tablet (20 mg) by mouth daily       ferrous sulfate (FEROSUL) 325 (65 Fe) MG tablet Take 1 tablet (325 mg) by mouth daily (with breakfast) 100 tablet 3     metoprolol succinate ER (TOPROL-XL) 200 MG 24 hr tablet Take 1 tablet (200 mg) by mouth daily 90 tablet 3     order for DME Urinary incontinence pad / panty liner       Medication reconciliation complete between Epic record and [facility].     Allergies:  No Known Allergies    Review of Systems:  Limited ROS due to hard of hearing. See HPI.   Denies chest pain, cough, shortness of breath  Sleeping ok  Denies pain    +sad today as she misses her son.     Vital Signs:  Temp 97.2   Pulse 52   Respirations 18   /47   Oxygen Sats 94%   Weight 156.9#  Noted bradycardia on exam as well.     Physical Exam:     Pleasant and alert without distress. Hard of hearing but seems some better after cerumen impaction cleared out   Sclera nonicteric,  conjunctiva non-inflamed. Wears glasses  Skin color pink. Mucous membranes moist.   Neck supple and without adenopathy   Lungs clear to auscultation throughout. Mild exp wheezing but pt seems to be exhaling forcefully  Cardiovascular regular, bradycardia. No murmur noted.  Abdomen soft and without tenderness   Extremities without edema.    Able to move upper and lower extremities       New Labs/Diagnostics:  Magnesium   Date Value Ref Range Status   02/07/2022 2.3 1.9 - 2.7 mg/dL Final     Lab Results   Component Value Date    WBC 8.3 02/07/2022    WBC 7.1 06/08/2021     Lab Results   Component Value Date    RBC 3.49 02/07/2022    RBC 3.27 06/08/2021     Lab Results   Component Value Date    HGB 11.5 02/07/2022    HGB 11.0 06/08/2021     Lab Results   Component Value Date    HCT 35.3 02/07/2022    HCT 33.7 06/08/2021     No components found for: MCT  Lab Results   Component Value Date     02/07/2022     06/08/2021     Lab Results   Component Value Date    MCH 33.0 02/07/2022    MCH 33.6 06/08/2021     Lab Results   Component Value Date    MCHC 32.6 02/07/2022    MCHC 32.6 06/08/2021     Lab Results   Component Value Date    RDW 13.2 02/07/2022    RDW 13.5 06/08/2021     Lab Results   Component Value Date     02/07/2022     06/08/2021     Last Comprehensive Metabolic Panel:  Sodium   Date Value Ref Range Status   02/07/2022 135 134 - 144 mmol/L Final   06/08/2021 142 134 - 144 mmol/L Final     Potassium   Date Value Ref Range Status   02/07/2022 4.2 3.5 - 5.1 mmol/L Final   06/08/2021 5.2 (H) 3.5 - 5.1 mmol/L Final     Chloride   Date Value Ref Range Status   02/07/2022 94 (L) 98 - 107 mmol/L Final   06/08/2021 108 (H) 98 - 107 mmol/L Final     Carbon Dioxide   Date Value Ref Range Status   06/08/2021 26 21 - 31 mmol/L Final     Carbon Dioxide (CO2)   Date Value Ref Range Status   02/07/2022 35 (H) 21 - 31 mmol/L Final     Anion Gap   Date Value Ref Range Status   02/07/2022 6 3 - 14  mmol/L Final   06/08/2021 8 3 - 14 mmol/L Final     Glucose   Date Value Ref Range Status   02/07/2022 171 (H) 70 - 105 mg/dL Final   06/08/2021 124 (H) 70 - 105 mg/dL Final     Urea Nitrogen   Date Value Ref Range Status   02/07/2022 33 (H) 7 - 25 mg/dL Final   06/08/2021 29 (H) 7 - 25 mg/dL Final     Creatinine   Date Value Ref Range Status   02/07/2022 1.64 (H) 0.60 - 1.20 mg/dL Final   06/08/2021 1.48 (H) 0.60 - 1.20 mg/dL Final     GFR Estimate   Date Value Ref Range Status   02/07/2022 29 (L) >60 mL/min/1.73m2 Final     Comment:     Effective December 21, 2021 eGFRcr in adults is calculated using the 2021 CKD-EPI creatinine equation which includes age and gender (James et al., [place], DOI: 10.1056/OHINju9306065)   06/08/2021 33 (L) >60 mL/min/[1.73_m2] Final     Calcium   Date Value Ref Range Status   02/07/2022 8.9 8.6 - 10.3 mg/dL Final   06/08/2021 8.7 8.6 - 10.3 mg/dL Final     Bilirubin Total   Date Value Ref Range Status   02/07/2022 0.3 0.3 - 1.0 mg/dL Final   06/08/2021 0.5 0.3 - 1.0 mg/dL Final     Alkaline Phosphatase   Date Value Ref Range Status   02/07/2022 57 34 - 104 U/L Final   06/08/2021 49 34 - 104 U/L Final     ALT   Date Value Ref Range Status   02/07/2022 14 7 - 52 U/L Final   06/08/2021 6 (L) 7 - 52 U/L Final     AST   Date Value Ref Range Status   02/07/2022 15 13 - 39 U/L Final   06/08/2021 10 (L) 13 - 39 U/L Final       Stable labs.     Assessment/Plan:  (E11.9) Type 2 diabetes mellitus without complication, without long-term current use of insulin (H)  (primary encounter diagnosis)  Comment: Blood sugars ranging 120-160, occasional in 200s  Plan: check A1c, no changes to meds    (K21.9) Gastroesophageal reflux disease without esophagitis  Comment: stable  Plan: cont pepcid  Check Mg    (I10) Primary hypertension  Comment: stable  Plan: cont coreg, but reduce toprol XL due to bradycardia  Check CBC, BMP    (L89.302) Pressure injury of buttock, stage 2, unspecified laterality  (H)  Comment: resolved  Plan: cont good skin care and pressure relieving measures    (H61.23) Bilateral impacted cerumen  (H72.91) Perforation of tympanic membrane, right  Comment: ENT Dr Mckeon cleaned out impactions and noted right TM with mild rupture  Plan: avoid water in right ear per Dr Mckeon instructions and ok to get fitted for hearing aides    (R00.1) Sinus bradycardia  Comment: HR in 50-70s  Plan: decrease toprol XL from 200 mg daily to 150 mg daily    A total of 65 minutes was spent with this patient, of which more than 50% was spent in counseling and/or coordination of care.     JASSI Mason, CNP ....................  5/3/2022   2:58 PM

## 2022-05-05 NOTE — PROGRESS NOTES
Pt has significant hypomagnesemia recurrent. Will restart slow Mag.   JASSI Mason CNP on 5/5/2022 at 9:52 AM

## 2022-06-22 NOTE — PROGRESS NOTES
[facility]    Chief Complaint   Patient presents with     long term Regulatory     60 day recertification       [facility] Medical Record Number:  [id number]  Place of Service where encounter took place: [facility]    HPI:    Lety Luna is a 89 year old  (7/24/1929), who is being seen today for a federally mandated E/M visit.  HPI information obtained from: facility chart records, facility staff and [facility] chart review. Today's concerns are:  1. Primary hypertension    2. Cardiomyopathy, unspecified type (H)    3. Type 2 diabetes mellitus with stage 3b chronic kidney disease, without long-term current use of insulin (H)    4. Chronic atrial fibrillation (H)      Per staff, she overall is doing okay and they deny any issues.    Vitals are reviewed from the past month and show /64, SpO2 %, HR 52-76, temp afebile and weight overall stable.    BS reviewed and fasting sugar is typically ~ 120-130; nonfasting sugars 140-210.    ALLERGIES: Patient has no known allergies.  PAST MEDICAL HISTORY:  has a past medical history of Atrial fibrillation (H), Chronic kidney disease, stage III (moderate) (H), Developmental disorder of scholastic skills, Essential (primary) hypertension, Gastrointestinal hemorrhage, Gout, Hyperlipidemia, Obesity, and Type 2 diabetes mellitus without complications (H).  PAST SURGICAL HISTORY:  has a past surgical history that includes Appendectomy open; Dilation and curettage; Laparoscopic tubal ligation; and Extracapsular cataract extration with intraocular lens implant (Bilateral).  FAMILY HISTORY: family history includes Diabetes in her father, mother, sister, sister, and son; Heart Disease in her brother, brother, father, and mother; Hypertension in her brother; Other - See Comments in her brother and sister.  SOCIAL HISTORY:  reports that she has never smoked. She has never used smokeless tobacco. She reports that she does not drink  alcohol and does not use drugs.    MEDICATIONS:  Current Outpatient Medications   Medication Sig Dispense Refill     acetaminophen (TYLENOL) 325 MG tablet Take 650 mg by mouth every 4 hours as needed for mild pain       allopurinol (ZYLOPRIM) 100 MG tablet TAKE 1 TABLET(100 MG) BY MOUTH DAILY 90 tablet 3     aspirin 81 MG tablet Take 81 mg by mouth daily with food       captopril (CAPOTEN) 50 MG tablet TAKE 1 TABLET(50 MG) BY MOUTH TWICE DAILY 180 tablet 3     diltiazem ER COATED BEADS (CARTIA XT) 240 MG 24 hr capsule Take 1 capsule (240 mg) by mouth daily 90 capsule 3     famotidine (PEPCID) 20 MG tablet Take 1 tablet (20 mg) by mouth daily       ferrous sulfate (FEROSUL) 325 (65 Fe) MG tablet Take 1 tablet (325 mg) by mouth daily (with breakfast) 100 tablet 3     Magnesium Cl-Calcium Carbonate (SLOW MAGNESIUM/CALCIUM)  MG TBEC Take 1 tablet by mouth 2 times daily       metoprolol succinate ER (TOPROL-XL) 50 MG 24 hr tablet Take 3 tablets (150 mg) by mouth daily       Miconazole Nitrate 2 % ointment Apply topically 2 times daily Apply thin layer to buttocks two times daily and prn       Nutritional Supplements (BOOST DIABETIC PO) Take 4 oz by mouth 2 times daily       order for DME Urinary incontinence pad / panty liner       pramoxine (PRAX) 1 % LOTN lotion Apply topically daily       Medications reviewed:  Medications reconciled to facility chart and changes were made to reflect current medications as identified as above med list.     Case Management:  I have reviewed the care plan and MDS and do agree with the plan. Patient's desire to return to the community is not assessible due to cognitive impairment.  Information reviewed:  Medications, vital signs, orders, and nursing notes.    ROS:  Unobtainable secondary to cognitive impairment.     Exam:  Vitals: There were no vitals taken for this visit.  BMI= There is no height or weight on file to calculate BMI.  GENERAL APPEARANCE:  appears healthy,  somnolent  ABDOMEN:  no distension    Lab/Diagnostic data:   CBC RESULTS:   Recent Labs   Lab Test 05/04/22  1630 02/07/22  1304   WBC 7.7 8.3   RBC 3.53* 3.49*   HGB 11.6* 11.5*   HCT 34.6* 35.3   MCV 98 101*   MCH 32.9 33.0   MCHC 33.5 32.6   RDW 13.2 13.2    304       Last Basic Metabolic Panel:  Recent Labs   Lab Test 05/04/22  1630 02/07/22  1304    135   POTASSIUM 4.2 4.2   CHLORIDE 98 94*   ASHLIE 8.6 8.9   CO2 29 35*   BUN 28* 33*   CR 1.31* 1.64*   * 171*       Liver Function Studies -   Recent Labs   Lab Test 02/07/22  1304 12/28/21  1813   PROTTOTAL 5.9* 7.1   ALBUMIN 3.5 3.9   BILITOTAL 0.3 0.7   ALKPHOS 57 53   AST 15 42*   ALT 14 17       No results found for: TSH]    Lab Results   Component Value Date    A1C 7.1 05/04/2022    A1C 6.4 11/10/2021    A1C 6.5 06/08/2021    A1C 6.2 06/30/2020       ASSESSMENT/PLAN     Cardiomyopathy, unspecified type (H)  Type 2 diabetes mellitus with stage 3b chronic kidney disease, without long-term current use of insulin (H)  Primary hypertension  Chronic atrial fibrillation (H)    Orders:  -Continue at this time with current medications and orders.     Electronically signed by:  Kacie Mayfield DO

## 2022-07-05 PROBLEM — E11.9 DIABETES MELLITUS, TYPE 2 (H): Status: ACTIVE | Noted: 2022-01-01

## 2022-07-05 PROBLEM — E11.9 DIABETES MELLITUS, TYPE II (H): Status: RESOLVED | Noted: 2018-01-23 | Resolved: 2022-01-01

## 2022-07-17 NOTE — PROGRESS NOTES
[facility]  Acute Care Visit    Patient Name: Lety Luna   : 1929  MRN: [id number]    Place of Service: [facility]  DOS: 2022    CC: left knee pain    HPI:  Lety Luna is a 89 year old female with PMH of multiple chronic medical concerns, who is seen today regarding left knee pain acute on chronic. Seems to be having a flare, knee is slightly swollen but per patient the swelling has come down quite a bit. It does appear mildly pinkish/red on lateral aspect. She reports no acute injury. It just started aching a few days ago and tylenol has helped but the pain keeps returning. She was to start biofreeze but this never did get started.   Pt is mostly wheelchair bound. Has known hx of gout. Currently taking allopurinol. She also has known osteoarthritis.       Multidisciplinary notes, laboratory values, medications, vital signs, weight and orders arereviewed from nursing home records. I have reviewed the patient s medical history and updated the computerized patient record.     PMH:  Past Medical History:   Diagnosis Date     Atrial fibrillation (H)     No anticoagulation due to GI bleed     Chronic kidney disease, stage III (moderate) (H)     No Comments Provided     Developmental disorder of scholastic skills     No Comments Provided     Essential (primary) hypertension     No Comments Provided     Gastrointestinal hemorrhage     ,3 units pRBC     Gout     No Comments Provided     Hyperlipidemia     No Comments Provided     Obesity     No Comments Provided     Type 2 diabetes mellitus without complications (H)     No Comments Provided       Medications:  Current Outpatient Medications   Medication Sig Dispense Refill     acetaminophen (TYLENOL) 325 MG tablet Take 650 mg by mouth every 4 hours as needed for mild pain       allopurinol (ZYLOPRIM) 100 MG tablet TAKE 1 TABLET(100 MG) BY MOUTH DAILY 90 tablet 3     aspirin 81 MG tablet Take 81 mg by mouth daily with food        captopril (CAPOTEN) 50 MG tablet TAKE 1 TABLET(50 MG) BY MOUTH TWICE DAILY 180 tablet 3     diltiazem ER COATED BEADS (CARTIA XT) 240 MG 24 hr capsule Take 1 capsule (240 mg) by mouth daily 90 capsule 3     famotidine (PEPCID) 20 MG tablet Take 1 tablet (20 mg) by mouth daily       ferrous sulfate (FEROSUL) 325 (65 Fe) MG tablet Take 1 tablet (325 mg) by mouth daily (with breakfast) 100 tablet 3     Magnesium Cl-Calcium Carbonate (SLOW MAGNESIUM/CALCIUM)  MG TBEC Take 1 tablet by mouth 2 times daily       metoprolol succinate ER (TOPROL-XL) 50 MG 24 hr tablet Take 3 tablets (150 mg) by mouth daily       Miconazole Nitrate 2 % ointment Apply topically 2 times daily Apply thin layer to buttocks two times daily and prn       Nutritional Supplements (BOOST DIABETIC PO) Take 4 oz by mouth 2 times daily       order for DME Urinary incontinence pad / panty liner       pramoxine (PRAX) 1 % LOTN lotion Apply topically daily       Medication reconciliation complete between Epic record and NH MAR.     Allergies:  No Known Allergies    Review of Systems:  +left knee pain aches and is quite painful at times. Pain seems to wax and wane.  +Left knee mildly swollen but per patient the swelling has decreased quite a bit in past few days.   Denies fever  Denies falling or any acute injury    Vital Signs:  Temp 97.3   Pulse 68   Respirations 18   /64 5/28 is last BP that is documented   Oxygen Sats 95%   Weight 156# 7/3 is last documented weight    Physical Exam:     Pleasant and alert without distress.    Sclera nonicteric, conjunctiva non-inflamed.  Skin color pink. Very Hard of hearing  Extremities without edema.  Left knee is mildly swollen. Left lateral aspect of knee is mildly pinkish red. Slightly warm to touch.   Able to move upper and lower extremities       New Labs/Diagnostics:  Lab Results   Component Value Date    WBC 7.2 07/12/2022    WBC 7.1 06/08/2021     Lab Results   Component Value Date    RBC 3.08  07/12/2022    RBC 3.27 06/08/2021     Lab Results   Component Value Date    HGB 10.1 07/12/2022    HGB 11.0 06/08/2021     Lab Results   Component Value Date    HCT 30.9 07/12/2022    HCT 33.7 06/08/2021     No components found for: MCT  Lab Results   Component Value Date     07/12/2022     06/08/2021     Lab Results   Component Value Date    MCH 32.8 07/12/2022    MCH 33.6 06/08/2021     Lab Results   Component Value Date    MCHC 32.7 07/12/2022    MCHC 32.6 06/08/2021     Lab Results   Component Value Date    RDW 14.1 07/12/2022    RDW 13.5 06/08/2021     Lab Results   Component Value Date     07/12/2022     06/08/2021     Uric Acid   Date Value Ref Range Status   07/12/2022 6.8 4.4 - 7.6 mg/dL Final     Left knee portable xray: 7/12/22  Linear calcifications consistent with chondrocalcinosis. There is severe lateral patellofemoral joint space narrowing with sclerosis and osteophyte formation. There is no acute fracture.     Noted uric acid and WBC are normal. Hx of gout and currently taking allopurinol.    Assessment/Plan:  (M25.562) Acute pain of left knee  (primary encounter diagnosis)  Comment: reviewed knee xray results, lab results-uric acid normal; WBC normal  Plan: appears to be an acute on chronic knee pain, osteoarthritis-worsening with acute flare up.   Will schedule tylenol 650 mg QID and voltaren gel QID to left knee.   Schedule ortho consult for cortisone injection.         A total of 32 minutes was spent with this patient, of which more than 50% was spent in counseling and/or coordination of care.     JASSI Mason, CNP ....................  7/17/2022   1:00 PM

## 2022-07-18 NOTE — NURSING NOTE
"Chief Complaint   Patient presents with     Knee Pain     Left knee pain, unsure of how long for     Patient presents for left knee pain, unsure of how long for. Patient states \"no pain only hurts when it rains or the weather is cold.\"     Initial /82 (BP Location: Right arm, Patient Position: Sitting, Cuff Size: Adult Regular)   Pulse 51   Temp 97  F (36.1  C) (Tympanic)   Resp 18   Wt 70.8 kg (156 lb)   SpO2 100%   BMI 29.48 kg/m   Estimated body mass index is 29.48 kg/m  as calculated from the following:    Height as of 4/27/22: 1.549 m (5' 1\").    Weight as of this encounter: 70.8 kg (156 lb).       Medication Reconciliation: Complete    Aleisha Doran LPN .......  7/18/2022  10:08 AM   "

## 2022-07-18 NOTE — PROGRESS NOTES
Sports Medicine Office Note    HPI:  89-year-old female coming in for evaluation of left knee pain.  She has a history of diabetes, gout, and is very hard of hearing.  Much of today's history is obtained from her son and his wife.    Left knee pain has been present for over a year without inciting event or trauma.  It bothers her intermittently.  She is wheelchair-bound.  She uses Tylenol to help with her symptoms.  She has a pedal bike that she uses from her wheelchair at her nursing home ([facility] [address]).  She has Voltaren gel on her medication list but upon talking with the patient/family it does not appear that she is using this medication.      EXAM:  /82 (BP Location: Right arm, Patient Position: Sitting, Cuff Size: Adult Regular)   Pulse 51   Temp 97  F (36.1  C) (Tympanic)   Resp 18   Wt 70.8 kg (156 lb)   SpO2 100%   BMI 29.48 kg/m    MUSCULOSKELETAL EXAM:  LEFT KNEE  Inspection:  -No gross deformity  -No bruising or soft tissue swelling  -Scars:  None    Tenderness to palpation of the:  -Quadriceps musculature:  Negative  -Quadriceps tendon:  Negative  -Patella:  Negative  -Medial patellar facet:  Negative  -Lateral patellar facet:  Negative  -Inferior pole of the patella:  Negative  -Patellar tendon:  Negative  -Tibial tuberosity:  Negative  -Medial joint line:  Negative  -Medial collateral ligament:  Negative  -Medial hamstring tendons:  Negative  -Medial femoral condyle:  Negative  -Medial tibial plateau:  Negative  -Pes anserine bursa:  Negative  -Lateral joint line:  Negative  -Distal IT band:  Negative  -Gerdy's tubercle:  Negative  -Lateral collateral ligament:  Negative  -Lateral hamstring tendons:  Negative  -Lateral femoral condyle:  Negative  -Lateral tibial plateau:  Negative  -Posterior lateral corner:  Negative  -Popliteal fossa:  Negative    Range of Motion:  -Passive extension:  0    Strength:  -Extension:  5/5  -Flexion:  5/5    Special Tests:  -Effusion:  Absent  -Valgus  stress:  Negative  -Varus stress:  Negative    Other:  -Intact sensation to light touch distally.  -No signs of cyanosis. Normal skin temperature of the lower extremity.  -Foot/ankle:  No gross deformity. Full range of motion.  -Right knee:  No gross deformity. No palpable tenderness. Normal strength and ROM.      IMAGIN2022: 3 view left knee x-ray  - Moderate medial tibiofemoral compartment joint space narrowing.  Moderate-severe degeneration of the patellofemoral joint with associated osteophytosis.  [place] view is underpenetrated on today's evaluation.      ASSESSMENT/PLAN:  Diagnoses and all orders for this visit:  Arthritis of left knee  -     XR Knee Left 3 Views  Patellofemoral arthritis of left knee  -     XR Knee Left 3 Views  Chondrocalcinosis of left knee  -     XR Knee Left 3 Views    89-year-old female with arthritis, most severe in the patellofemoral joint, and chondrocalcinosis of the left knee.  X-rays were performed in the office today and personally viewed by me with the findings as demonstrated above by my interpretation.  With her age and other comorbidities, treatment options are limited.  - Continue using the pedal bike daily  - Continue OTC APAP 3-4 times daily as needed  - Begin using OTC Voltaren 1% gel 3-4 times daily as needed  - Okay to apply heat and/or ice as needed  - Follow-up with persistent/worsening symptoms, consider formal PT referral or CSI in the future      Oz Goff MD  2022  10:18 AM    Total time spent with this patient was 26 minutes which included chart review, visualization and independent interpretation of images, time spent with the patient, and documentation.    Procedure time:  0 minute(s)

## 2022-08-24 PROBLEM — M17.12 PRIMARY OSTEOARTHRITIS OF LEFT KNEE: Status: ACTIVE | Noted: 2022-01-01

## 2022-08-24 NOTE — PROGRESS NOTES
[facility]  60 day re-certification    Patient Name: Lety Luna   : 1929  MRN: [id number]    Place of Service: [facility]  DOS: 2022    CC: 60 day re-certification    HPI:  Lety Luna is a 89 year old female with PMH of multiple chronic medical concerns, who is seen today regarding 60 day re-certification reviewing the following chronic medical concerns:    Gastroesophageal reflux disease without esophagitis  Type 2 diabetes mellitus with hyperglycemia, without long-term current use of insulin (H)  Primary hypertension  Longstanding persistent atrial fibrillation (H)  Primary osteoarthritis of left knee  Stage 3b chronic kidney disease (H)    Overall pt is stable and doing well but it is noted that she has lost 20# since Dec 2021.   She had significant left knee pain and swelling a couple months ago which this has now resolved for now and pain is at a minimum and controlled with voltaren gel and tylenol.   She is getting fitted for new dentures.     -120s taking diltiazem and captopril  HR 60-70s  Blood sugars 120-170 with occ in 200s but these seem to be checked 2 hours after lunch so it would make sense these are running a little higher. She is diet controlled.     It is noticeable she is having poor memory, dementia as she forgets her family visits her and then she is sad as she feels no one cares about her because they don't come to visit. She reads her Bible every day but today she can't seem to find her devotional but its lying on her bed.     Afib: heart rate controlled. Taking diltiazem.     GERD: no symptoms currently. Taking pepcid    Multidisciplinary notes, laboratory values, medications, vital signs, weight and orders arereviewed from nursing home records. I have reviewed the patient s medical history and updated the computerized patient record.     PMH:  Past Medical History:   Diagnosis Date     Atrial fibrillation (H)     No anticoagulation due to GI  bleed     Chronic kidney disease, stage III (moderate) (H)     No Comments Provided     Developmental disorder of scholastic skills     No Comments Provided     Essential (primary) hypertension     No Comments Provided     Gastrointestinal hemorrhage     2012,3 units pRBC     Gout     No Comments Provided     Hyperlipidemia     No Comments Provided     Obesity     No Comments Provided     Type 2 diabetes mellitus without complications (H)     No Comments Provided       Medications:  Current Outpatient Medications   Medication Sig Dispense Refill     acetaminophen (TYLENOL) 325 MG tablet Take 2 tablets (650 mg) by mouth 4 times daily For acute left knee pain       acetaminophen (TYLENOL) 325 MG tablet Take 650 mg by mouth every 4 hours as needed for mild pain       allopurinol (ZYLOPRIM) 100 MG tablet TAKE 1 TABLET(100 MG) BY MOUTH DAILY 90 tablet 3     aspirin 81 MG tablet Take 81 mg by mouth daily with food       captopril (CAPOTEN) 50 MG tablet TAKE 1 TABLET(50 MG) BY MOUTH TWICE DAILY 180 tablet 3     diclofenac (VOLTAREN) 1 % topical gel Apply 4 g topically 4 times daily Left knee       diltiazem ER COATED BEADS (CARTIA XT) 240 MG 24 hr capsule Take 1 capsule (240 mg) by mouth daily 90 capsule 3     famotidine (PEPCID) 20 MG tablet Take 1 tablet (20 mg) by mouth daily       ferrous sulfate (FEROSUL) 325 (65 Fe) MG tablet Take 1 tablet (325 mg) by mouth daily (with breakfast) 100 tablet 3     Magnesium Cl-Calcium Carbonate (SLOW MAGNESIUM/CALCIUM)  MG TBEC Take 1 tablet by mouth 2 times daily       metoprolol succinate ER (TOPROL XL) 100 MG 24 hr tablet TAKE 1 TABLET BY MOUTH DAILY ALONG WITH 50MG TABLET TO EQUAL 150MG       Miconazole Nitrate 2 % ointment Apply topically 2 times daily Apply thin layer to buttocks two times daily and prn       Nutritional Supplements (BOOST DIABETIC PO) Take 4 oz by mouth 2 times daily       order for DME Urinary incontinence pad / panty liner       pramoxine (PRAX) 1 %  LOTN lotion Apply topically daily       Skin Protectants, Misc. (ALOE VESTA PROTECTIVE) ointment APPLY A THIN LAYER TO BUTTOCKS TWICE DAILY;APPLY A THIN LAYER TO BUTTOCKS AS NEEDED       Medication reconciliation complete between Epic record and NH MAR.     Allergies:  No Known Allergies    Review of Systems:  Limited ROS due to poor memory. See HPI.   Knee pain much improved with minimal pain now  Nursing staff reports she is doing ok but does forget that her family visits her frequently and then she feels sad      Vital Signs:  Temp 97.5   Pulse 65   Respirations 18   /72   Oxygen Sats 97%   Weight 154.2# Weight is down 20# since Dec 2021.     Physical Exam:     Pleasant and alert without distress. Very [place]    Sclera nonicteric, conjunctiva non-inflamed. Glasses  Skin color pink. Mucous membranes moist.   Neck supple and without adenopathy   Lungs clear to auscultation throughout. No wheezes or rales noted.   Cardiovascular irregular. + murmur noted.  Abdomen soft and without tenderness   Extremities with minimal edema.     Left knee no redness or swelling noted.  Able to move upper and lower extremities         New Labs/Diagnostics:  Lab Results   Component Value Date    WBC 7.2 07/12/2022    WBC 7.1 06/08/2021     Lab Results   Component Value Date    RBC 3.08 07/12/2022    RBC 3.27 06/08/2021     Lab Results   Component Value Date    HGB 10.1 07/12/2022    HGB 11.0 06/08/2021     Lab Results   Component Value Date    HCT 30.9 07/12/2022    HCT 33.7 06/08/2021     No components found for: MCT  Lab Results   Component Value Date     07/12/2022     06/08/2021     Lab Results   Component Value Date    MCH 32.8 07/12/2022    MCH 33.6 06/08/2021     Lab Results   Component Value Date    MCHC 32.7 07/12/2022    MCHC 32.6 06/08/2021     Lab Results   Component Value Date    RDW 14.1 07/12/2022    RDW 13.5 06/08/2021     Lab Results   Component Value Date     07/12/2022     06/08/2021      Last Comprehensive Metabolic Panel:  Sodium   Date Value Ref Range Status   05/04/2022 136 134 - 144 mmol/L Final   06/08/2021 142 134 - 144 mmol/L Final     Potassium   Date Value Ref Range Status   05/04/2022 4.2 3.5 - 5.1 mmol/L Final   06/08/2021 5.2 (H) 3.5 - 5.1 mmol/L Final     Chloride   Date Value Ref Range Status   05/04/2022 98 98 - 107 mmol/L Final   06/08/2021 108 (H) 98 - 107 mmol/L Final     Carbon Dioxide   Date Value Ref Range Status   06/08/2021 26 21 - 31 mmol/L Final     Carbon Dioxide (CO2)   Date Value Ref Range Status   05/04/2022 29 21 - 31 mmol/L Final     Anion Gap   Date Value Ref Range Status   05/04/2022 9 3 - 14 mmol/L Final   06/08/2021 8 3 - 14 mmol/L Final     Glucose   Date Value Ref Range Status   05/04/2022 185 (H) 70 - 105 mg/dL Final   06/08/2021 124 (H) 70 - 105 mg/dL Final     Urea Nitrogen   Date Value Ref Range Status   05/04/2022 28 (H) 7 - 25 mg/dL Final   06/08/2021 29 (H) 7 - 25 mg/dL Final     Creatinine   Date Value Ref Range Status   05/04/2022 1.31 (H) 0.60 - 1.20 mg/dL Final   06/08/2021 1.48 (H) 0.60 - 1.20 mg/dL Final     GFR Estimate   Date Value Ref Range Status   05/04/2022 38 (L) >60 mL/min/1.73m2 Final     Comment:     Effective December 21, 2021 eGFRcr in adults is calculated using the 2021 CKD-EPI creatinine equation which includes age and gender (James et al., NEJM, DOI: 10.1056/GXGGou4966507)   06/08/2021 33 (L) >60 mL/min/[1.73_m2] Final     Calcium   Date Value Ref Range Status   05/04/2022 8.6 8.6 - 10.3 mg/dL Final   06/08/2021 8.7 8.6 - 10.3 mg/dL Final     Magnesium   Date Value Ref Range Status   06/01/2022 1.5 (L) 1.9 - 2.7 mg/dL Final     Stable. Chronic low Mg taking supplement    Assessment/Plan:  (I10) Primary hypertension  (primary encounter diagnosis)  Comment: stable, chronic  Plan: no changes    (K21.9) Gastroesophageal reflux disease without esophagitis  Comment: stable  Plan: no cvhanges    (E11.65) Type 2 diabetes mellitus with  hyperglycemia, without long-term current use of insulin (H)  Comment: blood sugars 120-170 with occ in 200s but these seem to be checked closer to 2 hours after lunch  Plan: no changes,diet controlled    (I48.11) Longstanding persistent atrial fibrillation (H)  Comment: chronic  Plan: no changes    (M17.12) Primary osteoarthritis of left knee  Comment: left knee much improved, minimal pain  Plan: cont voltaren gel and tylenol, if needed consider steroid injection    (N18.32) Stage 3b chronic kidney disease (H)  Comment: chronic,stable  Plan: avoid nephrotoxic meds        A total of 53 minutes was spent with this patient, of which more than 50% was spent in counseling and/or coordination of care.     JASSI Mason, CNP ....................  8/24/2022   9:58 AM

## 2022-08-29 NOTE — TELEPHONE ENCOUNTER
Michael sent Rx request for the following:      TRIAMTERENE 37.5MG/ HCTZ 25MG CAPS      Last Prescription Date:   6/8/2021  Last Fill Qty/Refills:         90, R-3    Last Office Visit:              8/18/2022   Future Office visit:           none    Discontinued 12/31/21 while in hospital due to hyperkalemia, CKD, and ASHLY    CAPTOPRIL 50MG TABLETS      Last Prescription Date:   9/8/2021  Last Fill Qty/Refills:         180, R-3        PRAZOSIN HCL 5MG CAPSULES      Last Prescription Date:   6/8/2021  Last Fill Qty/Refills:         270, R-3      Discontinued 12/29/2021 while in the hospital      DILTIAZEM CD 240MG CAPSULES (24 HR)      Last Prescription Date:   6/8/2021  Last Fill Qty/Refills:         90, R-3      Wilfrid Cabrera RN, BSN  ....................  8/29/2022   3:52 PM

## 2022-10-06 PROBLEM — N39.0 URINARY TRACT INFECTION WITH PYURIA: Status: RESOLVED | Noted: 2021-01-01 | Resolved: 2022-01-01

## 2022-10-06 PROBLEM — L89.302 PRESSURE INJURY OF BUTTOCK, STAGE 2, UNSPECIFIED LATERALITY (H): Status: RESOLVED | Noted: 2021-01-01 | Resolved: 2022-01-01

## 2022-10-06 NOTE — PROGRESS NOTES
Lety Luna is a 89 year old female being seen today for regulatory visit at [facility].    Code Status: Full Code still per my review of chart though looks like attempts to discuss with son  Health Care Power of : Extended Emergency Contact Information  Primary Emergency Contact: César Luna   [facility] Phone: [phone]  Relation: Son  Secondary Emergency Contact: AlejandroLillian riley   [facility]  Home Phone: [phone]  Mobile Phone: [phone]  Relation: Relative     Allergies: Patient has no known allergies.     Chief Complaint / HPI:   Patient feels off today but is unsure how or why  Nursing staff notes she picks at chest/under breasts. She denies this      Patient Active Problem List   Diagnosis     Arthropathy     Atrial fibrillation (H)     Cardiomyopathy (H)     Chronic kidney disease, stage III (moderate) (H)     GI bleed     Gout     Hypertension     Hypertriglyceridemia     Learning disability     Obesity     Peripheral edema     Spondylosis of cervical region without myelopathy or radiculopathy     Candida infection     Generalized muscle weakness     Traumatic rhabdomyolysis, subsequent encounter     Gastroesophageal reflux disease without esophagitis     Bilateral impacted cerumen     Perforation of tympanic membrane, right     Diabetes mellitus, type 2 (H)     Primary osteoarthritis of left knee         Past Medical, Surgical, Family and Social History reviewed: YES.       Current Outpatient Medications   Medication     acetaminophen (TYLENOL) 325 MG tablet     acetaminophen (TYLENOL) 325 MG tablet     allopurinol (ZYLOPRIM) 100 MG tablet     aspirin 81 MG tablet     captopril (CAPOTEN) 50 MG tablet     diclofenac (VOLTAREN) 1 % topical gel     diltiazem ER COATED BEADS (CARDIZEM CD/CARTIA XT) 240 MG 24 hr capsule     famotidine (PEPCID) 20 MG tablet     ferrous sulfate (FEROSUL) 325 (65 Fe) MG tablet     Magnesium Cl-Calcium Carbonate (SLOW  MAGNESIUM/CALCIUM)  MG TBEC     metoprolol succinate ER (TOPROL XL) 100 MG 24 hr tablet     metoprolol succinate ER (TOPROL XL) 50 MG 24 hr tablet     Miconazole Nitrate 2 % ointment     Nutritional Supplements (BOOST DIABETIC PO)     order for DME     pramoxine (PRAX) 1 % LOTN lotion     No current facility-administered medications for this visit.       Medications - recent changes: none    Review of Systems:  Musculoskeletal: positive for sore low back  All other systems negative      Toileting:    Continent of Bowel: Yes   Continent of Bladder: No  Mobility: wheelchair    Recent Labs:   Lab Results   Component Value Date    WBC 7.2 07/12/2022    WBC 7.1 06/08/2021     Lab Results   Component Value Date    RBC 3.08 07/12/2022    RBC 3.27 06/08/2021     Lab Results   Component Value Date    HGB 10.1 07/12/2022    HGB 11.0 06/08/2021     Lab Results   Component Value Date    HCT 30.9 07/12/2022    HCT 33.7 06/08/2021     Lab Results   Component Value Date     07/12/2022     06/08/2021     Lab Results   Component Value Date    MCH 32.8 07/12/2022    MCH 33.6 06/08/2021     Lab Results   Component Value Date    MCHC 32.7 07/12/2022    MCHC 32.6 06/08/2021     Lab Results   Component Value Date    RDW 14.1 07/12/2022    RDW 13.5 06/08/2021     Lab Results   Component Value Date     07/12/2022     06/08/2021       Last Comprehensive Metabolic Panel:  Sodium   Date Value Ref Range Status   05/04/2022 136 134 - 144 mmol/L Final   06/08/2021 142 134 - 144 mmol/L Final     Potassium   Date Value Ref Range Status   05/04/2022 4.2 3.5 - 5.1 mmol/L Final   06/08/2021 5.2 (H) 3.5 - 5.1 mmol/L Final     Chloride   Date Value Ref Range Status   05/04/2022 98 98 - 107 mmol/L Final   06/08/2021 108 (H) 98 - 107 mmol/L Final     Carbon Dioxide   Date Value Ref Range Status   06/08/2021 26 21 - 31 mmol/L Final     Carbon Dioxide (CO2)   Date Value Ref Range Status   05/04/2022 29 21 - 31 mmol/L Final      Anion Gap   Date Value Ref Range Status   05/04/2022 9 3 - 14 mmol/L Final   06/08/2021 8 3 - 14 mmol/L Final     Glucose   Date Value Ref Range Status   05/04/2022 185 (H) 70 - 105 mg/dL Final   06/08/2021 124 (H) 70 - 105 mg/dL Final     Urea Nitrogen   Date Value Ref Range Status   05/04/2022 28 (H) 7 - 25 mg/dL Final   06/08/2021 29 (H) 7 - 25 mg/dL Final     Creatinine   Date Value Ref Range Status   05/04/2022 1.31 (H) 0.60 - 1.20 mg/dL Final   06/08/2021 1.48 (H) 0.60 - 1.20 mg/dL Final     GFR Estimate   Date Value Ref Range Status   05/04/2022 38 (L) >60 mL/min/1.73m2 Final     Comment:     Effective December 21, 2021 eGFRcr in adults is calculated using the 2021 CKD-EPI creatinine equation which includes age and gender (James et al., [place], DOI: 10.1056/RUSJfn4234770)   06/08/2021 33 (L) >60 mL/min/[1.73_m2] Final     Calcium   Date Value Ref Range Status   05/04/2022 8.6 8.6 - 10.3 mg/dL Final   06/08/2021 8.7 8.6 - 10.3 mg/dL Final     Lab Results   Component Value Date    A1C 7.1 05/04/2022    A1C 6.4 11/10/2021    A1C 6.5 06/08/2021    A1C 6.2 06/30/2020    A1C 6.5 10/16/2019    A1C 6.4 07/01/2019    A1C 6.3 03/22/2019     Lab Results   Component Value Date    CHOL 112 06/08/2021     Lab Results   Component Value Date    HDL 31 06/08/2021     Lab Results   Component Value Date    LDL 54 06/08/2021     Lab Results   Component Value Date    TRIG 137 06/08/2021     No results found for: CHOLHDLRATIO  No results found for: TSH    HCM items due:  Lipid, micro albumin, zoster, diabetic foot exam, flue vax, eye exam     Pertinent Screening Tool results: None      Current Therapies: none    Exam:  Vital signs reviewed.   GENERAL APPEARANCE: alert, sliding forward in wheelchair seat  EYES: Eyes grossly normal to inspection and conjunctivae and sclerae normal  CV: irregularly irregular   MS: no musculoskeletal defects are noted and gait is age appropriate without ataxia  SKIN: scattered red pustules  underneath breasts with satellite lesions  PSYCH: mentation appears normal and affect normal/bright    Assessment and Plan:    ICD-10-CM    1. Type 2 diabetes mellitus with hyperglycemia, without long-term current use of insulin (H)  E11.65    2. Primary hypertension  I10    3. Cardiomyopathy, unspecified type (H)  I42.9    4. Longstanding persistent atrial fibrillation (H)  I48.11    5. Generalized muscle weakness  M62.81          1.  Plan of care reviewed and signed.  discharge prn aloe changes made.  2. Skin picking--likely starts from yeast infection under breasts. Continue miconazole. If progressing could consider low dose selective serotonin reuptake inhibitor though with her age want to decrease meds and risk of side effects  3. Nursing requesting repeat urine due to ESBL. No current urinary symptoms so will hold off  4. History of HLD, is 89 so statin not indicated so no need to check at this time   5. Glucose has been up trending but last a1c 7.1 which is fine for her age. Would not want to risk hypoglycemia with diabetic medication. Continue to monitor       Sukhwinder Byrd MD

## 2022-11-07 NOTE — PROGRESS NOTES
Lety is a 89 year old who is being evaluated via a billable telephone visit  for antiviral therapy in the setting of positive Covid-19 home test.     What phone number would you like to be contacted at? [phone]  How would you like to obtain your AVS? Fax to [facility] [phone]      Assessment & Plan   Lety Luna is a 89 year old, presenting for the following health issues:      ICD-10-CM    1. Infection due to 2019 novel coronavirus  U07.1 molnupiravir (LAGEVRIO) 200 MG capsule        Patient is not a good candidate for paxlovid due to poor kidney function. She is right at the cutoff for contraindication and her GFR is 33-38. Molnupiravir would be a better option for her.     Discussed signs/ symptoms that would warrant urgent/ emergent follow-up. Patient in agreement with plan. AVS reviewed with patient. All questions and concerns addressed this visit.                Return if symptoms worsen or fail to improve, for Return as needed for new or worsening symptoms.    JASSI Aj CNP  [facility] AND [facility]        Subjective   Lety is a 89 year old accompanied by her spoke with nurse, presenting for the following health issues:  Medication Request (Requesting Antiviral treatment. Patient tested positive 11/6/22. Symptoms are loose stool and headache./)    Zaida Turpin LPN....................  11/7/2022   11:43 AM             Phone call duration: minutes

## 2022-11-07 NOTE — PROGRESS NOTES
"Lety is a 89 year old who is being evaluated via a billable telephone visit.      What phone number would you like to be contacted at? [phone]  How would you like to obtain your AVS? Mail a copy    {PROVIDER CHARTING PREFERENCE:525598}    Subjective   Lety is a 89 year old{ACCOMPANIED BY STATEMENT (Optional):874188}, presenting for the following health issues:  No chief complaint on file.      HPI       COVID-19 Symptom Review  How many days ago did these symptoms start? ***    Are any of the following symptoms significant for you?    New or worsening difficulty breathing? {COVID19 DIFFICULTY BREATHIN}    Worsening cough? {COVID19 COUGH YES:953934}    Fever or chills? {COVID19 FEVER:329903}    Headache: {.:931341::\"No\"}    Sore throat: {.:883902::\"No\"}    Chest pain: {.:520628::\"No\"}    Diarrhea: {.:620432::\"No\"}    Body aches? {.:708390::\"No\"}    What treatments has patient tried? {URI Treatment:332081}   Does patient live in a nursing home, group home, or shelter? {.:017313::\"No\"}  Does patient have a way to get food/medications during quarantined? {COVID19 Quarantine Assistance:884328::\"Yes, I have a friend or family member who can help me.\"}    {Provider  Link to COVID SmartSet :313130}          {additonal problems for provider to add (Optional):623916}    Review of Systems   {ROS COMP (Optional):115889}      Objective           Vitals:  No vitals were obtained today due to virtual visit.    Physical Exam   {GENERAL APPEARANCE:50::\"healthy\",\"alert\",\"no distress\"}  PSYCH: Alert and oriented times 3; coherent speech, normal   rate and volume, able to articulate logical thoughts, able   to abstract reason, no tangential thoughts, no hallucinations   or delusions  Her affect is { :4980250::\"normal\"}  RESP: No cough, no audible wheezing, able to talk in full sentences  Remainder of exam unable to be completed due to telephone visits    {Diagnostic Test Results (Optional):940121}    {AMBULATORY " ATTESTATION (Optional):426769}        Phone call duration: *** minutes

## 2022-11-07 NOTE — PATIENT INSTRUCTIONS
COVID-19 Outpatient Treatments  Your care team can help you find the best treatments for COVID-19. Talk to a health care provider or refer to the FDA medicine fact sheets below.    Important: You CAN'T have molnupiravir or Paxlovid if you are starting the medicine more than 5 days after your symptoms have started.  Paxlovid: https://www.fda.gov/media/842731/download  Molnupiravir: https://www.fda.gov/media/488765/download  Monoclonal antibodies: https://combatcovid.hhs.gov/what-are-monoclonal-antibodies  Paxlovid (nimatrelvir and ritonavir)  How it works  Two medicines (nirmatrelvir and ritonavir) are taken together. They stop the virus from growing. Less amount of virus is easier for your body to fight.  Benefits  Lowers risk of a hospital stay or death from COVID-19.  How to take    Medicine comes in a daily container with both medicine tablets. Take by mouth twice daily (once in the morning, once at night) for 5 days.    The number of tablets to take varies by patient.    Don't chew or break capsules. Swallow whole.  When to take  Take as soon as possible after positive COVID-19 test result, and within 5 days of your first symptoms.  Who can take it  Patients must be 12 years or older, weigh at least 88 pounds, and have tested positive for COVID-19. This is the preferred treatment for pregnant patients.  Possible side effects  Can cause altered sense of taste, diarrhea (loose, watery stools), high blood pressure, muscle aches.  Medicine conflicts    Some medicines may conflict with Paxlovid and may cause serious side effects.    Tell your care team about all the medicines you take, including prescription and over-the-counter medicines, vitamins and herbal supplements.    Your provider will review your medicines to make sure that you can safely take Paxlovid.  Cautions    Paxlovid is not advised for patients with severe kidney or liver disease. If you have kidney or liver problems, the dose may need to be  changed.    If you are pregnant or breastfeeding, talk to your care team about your options.    If you are sexually active, use trusted birth control while taking Paxlovid.  Molnupiravir  How it works  Stops the virus from growing. Less amount of virus is easier for your body to fight.  Benefits  Lowers risk of a hospital stay or death from COVID-19.  How to take  Take 4 capsules by mouth every 12 hours (4 in the morning and 4 at night) for 5 days. Don't chew or break capsules. Swallow whole.  When to take  Take as soon as possible after positive COVID-19 test result, and within 5 days of your first symptoms.  Who can take it  Patients must be 18 years or older and have tested positive for COVID-19.  Possible side effects  Diarrhea (loose, watery stools), nausea (feeling sick to your stomach), dizziness, headaches.  Medicine conflicts  Right now, there are no known conflicts with other drugs. But tell your care team all medicines you take.  Cautions    This is not advised for patients who are pregnant.    Patients who could become pregnant should use trusted birth control until 4 days after their last dose.    Sexually active people of any gender should use trusted birth control for 3 months after their last dose.  Monoclonal antibodies  How it works  Monoclonal antibodies can detect pieces of the COVID virus and stop it from infecting your cells.  Benefits  Lowers risk of a hospital stay or death from COVID-19. Monoclonal antibodies are known to work well against the omicron variant.  How it is given to you  You will receive the treatment either by an infusion through your vein (IV) or shots.  When to take  Get as soon as possible after you test positive for COVID-19, and within 7 days of your first symptoms.  Who can take it  Patients must be 12 years or older, weigh at least 88 pounds and have tested positive for COVID-19.  Possible side effects  Fever, chills, diarrhea (loose, watery stools), dizziness,  itchiness and rash.  More serious side effects include: fever, difficulty breathing, low oxygen level in your blood, chills, tiredness, fast or slow heart rate, chest discomfort or pain, weakness, confusion, nausea, headache, shortness of breath, low or high blood pressure, wheezing, swelling of your lips, face, or throat, rash including hives, itching, muscle aches, dizziness, feeling faint and sweating.  If you receive an IV, you may have brief pain, bleeding and bruising of the skin, soreness, swelling and possible infection at the place where you get the IV needle.  Medicine conflicts  Please tell you care team other medicines you take so they can assess if there are any conflicts.  Cautions  Your doctor will talk with you about risks and benefits of this treatment and will help choose the best option for you.  For informational purposes only. Not to replace the advice of your health care provider.  Copyright   2022 [facility]. All rights reserved. Clinically reviewed by Nelli Caba. [employer] [id number] - 08/22.    Instructions for Patients      What are the symptoms of COVID-19?  Symptoms can include fever, cough, shortness of breath, chills, headache, muscle pain sore throat, fatigue, runny or stuffy nose, and loss of taste and smell. Other less common symptoms include nausea, vomiting, or diarrhea (watery stools).    Know when to call 911. Emergency warning signs include:    Trouble breathing or shortness of breath    Pain or pressure in the chest that doesn't go away    Feeling confused like you haven't felt before, or not being able to wake up    Bluish-colored lips or face    How can I take care of myself?  1. Get lots of rest. Drink extra fluids (unless a doctor has told you not to).  2. Take Tylenol (acetaminophen) for fever or pain. If you have liver or kidney problems, ask your family doctor if it's okay to take Tylenol   Adults:   650 mg (two 325 mg pills or tablets) every 4 to 6 hours,  or...   1,000 mg (two 500 mg pills or tablets) every 8 hours as needed.  Note: Don't take more than 3,000 mg in one day. Acetaminophen is found in many medicines (both prescribed and over the counter). Read all labels to be sure you don't take too much.  For children, check the Tylenol bottle for the right dose. The dose is based on the child's age or weight.  3. Take over the counter medicines for your symptoms as needed. Talk to your pharmacist.  4. If you have other health problems (like cancer, heart failure, an organ transplant, or severe kidney disease): Call your specialty clinic if you don't feel better in the next 2 days.    Where can I get more information?     [employer] COVID-19 Resource Hub: [email or web address]     CDC Quarantine & Isolation: https://www.cdc.gov/coronavirus/2019-ncov/your-health/quarantine-isolation.html     CDC - What to Do If You're Sick: https://www.cdc.gov/coronavirus/2019-ncov/if-you-are-sick/index.html    [facility] clinical trials (COVID-19 research studies): clinicalaffairs.[facility]/umn-clinical-trials    [place] Department of Health COVID-19 Public Hotline: 1-294.737.9717  Coping with Life After COVID-19  Being in the hospital because of COVID-19 is scary. Going home can be scary, too. You may face changes to your life, the way you work or what you can eat. It s hard to adjust to change, and it s normal to feel afraid, frustrated or even angry. These feelings usually go away over time. If your feelings don t start to get better, it s called  adjustment disorder.      What signs should I look out for?  Adjustment disorder can happen to anyone in a time of stress. It makes it hard to cope with daily life. You may feel lonely or fight with loved ones, even if you re glad to be home. Watch for these signs:  Fear or worry  Hard time focusing  Sadness or anger  Trouble talking to family or friends  Feeling like you don t fit in or isolating  yourself  Problems with sleep   Drinking alcohol or taking drugs to cope    What can I do?  You can help yourself get better. Feeling you have control helps you move forward. You may wonder if you ll be able to do things you did before. Be patient. Do your best to make the most of every day. Try to build relationships, be as active as you can, eat right and keep a sense of humor. Avoid smoking and drinking too much alcohol. Call your family doctor or clinic if you re not sure what to do. They can guide you to care or other services.    When should I get help?  Think about getting counseling if your sadness or frustration gets worse. Together with a trained counselor, you can talk about your problems adjusting to life after your hospital stay. You can come up with new ways to handle changes that give you more control. Your family doctor or care team can help you find a counselor.     Get help if you re thinking about hurting yourself. If you need help right away, call 911 or go to the nearest emergency room. You can also try the Crisis Text Line.    Crisis Text Line: 109-594 (http://www.crisistextline.org)  The Crisis Text Line serves anyone, in any crisis. It gives free, 24/7 support. Here's how it works:  Text HOME to 423054 from anywhere in the [place], anytime, about any type of crisis.  A live, trained Crisis Counselor will text you back quickly.  The volunteer Crisis Counselor can help you move from a  hot moment  to a  cool moment.  They can also help you work out a safety plan.  COVID-19 Outpatient Treatments  Your care team can help you find the best treatments for COVID-19. Talk to a health care provider or refer to the FDA medicine fact sheets below.  Important: You CAN'T have molnupiravir or Paxlovid if you are starting the medicine more than 5 days after your symptoms have started.  Paxlovid: https://www.fda.gov/media/248003/download  Molnupiravir: https://www.fda.gov/media/770140/download  Monoclonal  antibodies: https://combatcovid.hhs.gov/what-are-monoclonal-antibodies  Paxlovid (nimatrelvir and ritonavir)  How it works  Two medicines (nirmatrelvir and ritonavir) are taken together. They stop the virus from growing. Less amount of virus is easier for your body to fight.  Benefits  Lowers risk of a hospital stay or death from COVID-19.  How to take  Medicine comes in a daily container with both medicine tablets. Take by mouth twice daily (once in the morning, once at night) for 5 days.  The number of tablets to take varies by patient.  Don't chew or break capsules. Swallow whole.  When to take  Take as soon as possible after positive COVID-19 test result, and within 5 days of your first symptoms.  Who can take it  Patients must be 12 years or older, weigh at least 88 pounds, and have tested positive for COVID-19. This is the preferred treatment for pregnant patients.  Possible side effects  Can cause altered sense of taste, diarrhea (loose, watery stools), high blood pressure, muscle aches.  Medicine conflicts  Some medicines may conflict with Paxlovid and may cause serious side effects.  Tell your care team about all the medicines you take, including prescription and over-the-counter medicines, vitamins and herbal supplements.  Your provider will review your medicines to make sure that you can safely take Paxlovid.  Cautions  Paxlovid is not advised for patients with severe kidney or liver disease. If you have kidney or liver problems, the dose may need to be changed.  If you are pregnant or breastfeeding, talk to your care team about your options.  If you are sexually active, use trusted birth control while taking Paxlovid.  Molnupiravir  How it works  Stops the virus from growing. Less amount of virus is easier for your body to fight.  Benefits  Lowers risk of a hospital stay or death from COVID-19.  How to take  Take 4 capsules by mouth every 12 hours (4 in the morning and 4 at night) for 5 days. Don't chew  or break capsules. Swallow whole.  When to take  Take as soon as possible after positive COVID-19 test result, and within 5 days of your first symptoms.  Who can take it  Patients must be 18 years or older and have tested positive for COVID-19.  Possible side effects  Diarrhea (loose, watery stools), nausea (feeling sick to your stomach), dizziness, headaches.  Medicine conflicts  Right now, there are no known conflicts with other drugs. But tell your care team all medicines you take.  Cautions  This is not advised for patients who are pregnant.  Patients who could become pregnant should use trusted birth control until 4 days after their last dose.  Sexually active people of any gender should use trusted birth control for 3 months after their last dose.  Monoclonal antibodies  How it works  Monoclonal antibodies can detect pieces of the COVID virus and stop it from infecting your cells.  Benefits  Lowers risk of a hospital stay or death from COVID-19. Monoclonal antibodies are known to work well against the omicron variant.  How it is given to you  You will receive the treatment either by an infusion through your vein (IV) or shots.  When to take  Get as soon as possible after you test positive for COVID-19, and within 7 days of your first symptoms.  Who can take it  Patients must be 12 years or older, weigh at least 88 pounds and have tested positive for COVID-19.  Possible side effects  Fever, chills, diarrhea (loose, watery stools), dizziness, itchiness and rash.  More serious side effects include: fever, difficulty breathing, low oxygen level in your blood, chills, tiredness, fast or slow heart rate, chest discomfort or pain, weakness, confusion, nausea, headache, shortness of breath, low or high blood pressure, wheezing, swelling of your lips, face, or throat, rash including hives, itching, muscle aches, dizziness, feeling faint and sweating.  If you receive an IV, you may have brief pain, bleeding and bruising  of the skin, soreness, swelling and possible infection at the place where you get the IV needle.  Medicine conflicts  Please tell you care team other medicines you take so they can assess if there are any conflicts.  Cautions  Your doctor will talk with you about risks and benefits of this treatment and will help choose the best option for you.  For informational purposes only. Not to replace the advice of your health care provider.  Copyright   2022 [facility]. All rights reserved. Clinically reviewed by Nelli Caba. [employer] [id number] - 08/22.    Instructions for Patients  {Choose where to send COVID19 patient based on nurse triage or clinical judgement of symptom severity & add additional information if desired (Optional):[id number]}    What are the symptoms of COVID-19?  Symptoms can include fever, cough, shortness of breath, chills, headache, muscle pain sore throat, fatigue, runny or stuffy nose, and loss of taste and smell. Other less common symptoms include nausea, vomiting, or diarrhea (watery stools).    Know when to call 911. Emergency warning signs include:  Trouble breathing or shortness of breath  Pain or pressure in the chest that doesn't go away  Feeling confused like you haven't felt before, or not being able to wake up  Bluish-colored lips or face    How can I take care of myself?  Get lots of rest. Drink extra fluids (unless a doctor has told you not to).  Take Tylenol (acetaminophen) for fever or pain. If you have liver or kidney problems, ask your family doctor if it's okay to take Tylenol   Adults:   650 mg (two 325 mg pills or tablets) every 4 to 6 hours, or...   1,000 mg (two 500 mg pills or tablets) every 8 hours as needed.  Note: Don't take more than 3,000 mg in one day. Acetaminophen is found in many medicines (both prescribed and over the counter). Read all labels to be sure you don't take too much.  For children, check the Tylenol bottle for the right dose. The dose is based  on the child's age or weight.  Take over the counter medicines for your symptoms as needed. Talk to your pharmacist.  If you have other health problems (like cancer, heart failure, an organ transplant, or severe kidney disease): Call your specialty clinic if you don't feel better in the next 2 days.    Where can I get more information?   [employer] [place] COVID-19 Resource Hub: [email or web address]/[email or web address]   CDC Quarantine & Isolation: https://www.cdc.gov/coronavirus/2019-ncov/your-health/quarantine-isolation.html   CDC - What to Do If You're Sick: https://www.cdc.gov/coronavirus/2019-ncov/if-you-are-sick/index.html  [facility] clinical trials (COVID-19 research studies): clinicalaffairs.[facility]/umn-clinical-trials  [place] Department of Health COVID-19 Public Hotline: 1-766.268.4480  Coping with Life After COVID-19  Being in the hospital because of COVID-19 is scary. Going home can be scary, too. You may face changes to your life, the way you work or what you can eat. It s hard to adjust to change, and it s normal to feel afraid, frustrated or even angry. These feelings usually go away over time. If your feelings don t start to get better, it s called  adjustment disorder.      What signs should I look out for?  Adjustment disorder can happen to anyone in a time of stress. It makes it hard to cope with daily life. You may feel lonely or fight with loved ones, even if you re glad to be home. Watch for these signs:  Fear or worry  Hard time focusing  Sadness or anger  Trouble talking to family or friends  Feeling like you don t fit in or isolating yourself  Problems with sleep   Drinking alcohol or taking drugs to cope    What can I do?  You can help yourself get better. Feeling you have control helps you move forward. You may wonder if you ll be able to do things you did before. Be patient. Do your best to make the most of every day. Try to build relationships, be as active as you can, eat right  and keep a sense of humor. Avoid smoking and drinking too much alcohol. Call your family doctor or clinic if you re not sure what to do. They can guide you to care or other services.    When should I get help?  Think about getting counseling if your sadness or frustration gets worse. Together with a trained counselor, you can talk about your problems adjusting to life after your hospital stay. You can come up with new ways to handle changes that give you more control. Your family doctor or care team can help you find a counselor.     Get help if you re thinking about hurting yourself. If you need help right away, call 911 or go to the nearest emergency room. You can also try the Crisis Text Line.    Crisis Text Line: 674-631 (http://www.crisistextline.org)  The Crisis Text Line serves anyone, in any crisis. It gives free, 24/7 support. Here's how it works:  Text HOME to 870547 from anywhere in the [place], anytime, about any type of crisis.  A live, trained Crisis Counselor will text you back quickly.  The volunteer Crisis Counselor can help you move from a  hot moment  to a  cool moment.  They can also help you work out a safety plan.

## 2022-11-07 NOTE — NURSING NOTE
Chief Complaint   Patient presents with     Medication Request     Requesting Antiviral treatment. Patient tested positive 11/6/22. Symptoms are loose stool and headache.         Patient resides at [facility] [phone]    Medication Reconciliation: complete      Zaida Turpin LPN....................  11/7/2022   11:40 AM

## 2022-11-08 PROBLEM — U07.1 INFECTION DUE TO 2019 NOVEL CORONAVIRUS: Status: ACTIVE | Noted: 2022-01-01

## 2022-11-09 NOTE — PROGRESS NOTES
[facility]  Acute Care Visit    Patient Name: Lety Luna   : 1929  MRN: [id number]    Place of Service: [facility]  DOS: 2022    CC: delirium    HPI:  Lety Luna is a 89 year old female with PMH of multiple chronic medical concerns, who is seen today regarding pt was tested Covid-19 positive  but was showing symptoms 22. Pt was immediately placed in isolation. Pt has mod to severe dementia and due to being isolated she was very angry and became combative, aggressive, hitting staff, yelling. She was intermittently consolable but then aggression would start up again. She has talked to son on phone multiple times. Son and staff have tried to tell her she has covid-19 and that is why she is quarantined. She is not capable at this time to be rational and understand the circumstance. She is very focused on leaving her room, she begs and pleads to just sit in hallway. She believes she is being held prisoner. She has tried to call 911 and I believe was successful one time. She has had a staff member sitting with her 1:1 to keep her safe. At this time she is refusing to eat or drink although she is taking small sips of water. She is refusing to take her medications at times.   As far as covid-19 symptoms: she has a dry cough. She has had diarrhea but this is her norm. She was started on antiviral.   Family has been updated several times. They are not able to come in and sit with her because of their own co-morbidities with being exposed to covid-19.   Vitals are stable. NO fever.     Multidisciplinary notes, laboratory values, medications, vital signs, weight and orders arereviewed from nursing home records. I have reviewed the patient s medical history and updated the computerized patient record.     PMH:  Past Medical History:   Diagnosis Date     Atrial fibrillation (H)     No anticoagulation due to GI bleed     Chronic kidney disease, stage III (moderate) (H)     No  Comments Provided     Developmental disorder of scholastic skills     No Comments Provided     Essential (primary) hypertension     No Comments Provided     Gastrointestinal hemorrhage     2012,3 units pRBC     Gout     No Comments Provided     Hyperlipidemia     No Comments Provided     Obesity     No Comments Provided     Type 2 diabetes mellitus without complications (H)     No Comments Provided       Medications:  Current Outpatient Medications   Medication Sig Dispense Refill     acetaminophen (TYLENOL) 325 MG tablet Take 2 tablets (650 mg) by mouth 4 times daily For acute left knee pain       acetaminophen (TYLENOL) 325 MG tablet Take 650 mg by mouth every 4 hours as needed for mild pain       allopurinol (ZYLOPRIM) 100 MG tablet TAKE 1 TABLET(100 MG) BY MOUTH DAILY 90 tablet 3     aspirin 81 MG tablet Take 81 mg by mouth daily with food       captopril (CAPOTEN) 50 MG tablet TAKE 1 TABLET(50 MG) BY MOUTH TWICE DAILY 180 tablet 3     diclofenac (VOLTAREN) 1 % topical gel Apply 4 g topically 4 times daily Left knee       diltiazem ER COATED BEADS (CARDIZEM CD/CARTIA XT) 240 MG 24 hr capsule TAKE 1 CAPSULE(240 MG) BY MOUTH DAILY 90 capsule 3     famotidine (PEPCID) 20 MG tablet Take 1 tablet (20 mg) by mouth daily       ferrous sulfate (FEROSUL) 325 (65 Fe) MG tablet Take 1 tablet (325 mg) by mouth daily (with breakfast) 100 tablet 3     Magnesium Cl-Calcium Carbonate (SLOW MAGNESIUM/CALCIUM)  MG TBEC Take 1 tablet by mouth 2 times daily       metoprolol succinate ER (TOPROL XL) 100 MG 24 hr tablet TAKE 1 TABLET BY MOUTH DAILY ALONG WITH 50MG TABLET TO EQUAL 150MG       metoprolol succinate ER (TOPROL XL) 50 MG 24 hr tablet TAKE 1 TABLET BY MOUTH DAILY ALONG WITH 100MG TABLET TO EQUAL 150MG       Miconazole Nitrate 2 % ointment Apply topically 2 times daily Apply thin layer to buttocks two times daily and prn       molnupiravir (LAGEVRIO) 200 MG capsule Take 4 capsules (800 mg) by mouth every 12 hours for  5 days 40 each 0     Nutritional Supplements (BOOST DIABETIC PO) Take 4 oz by mouth 2 times daily       order for DME Urinary incontinence pad / panty liner       pramoxine (PRAX) 1 % LOTN lotion Apply topically daily       Medication reconciliation complete between [facility] record and NH MAR.     Allergies:  No Known Allergies    Review of Systems:  See HPI  Pt will not answer questions. She is paranoid and keeps yelling at me and says she is going to call the     Vital Signs:  Temp 97.3   Pulse 76   Respirations 18   /66   Oxygen Sats 94%   Weight 153#    Physical Exam:     Alert, sitting in wheelchair near door, rocking back and forth, yelling in distress due to being quarantined    Sclera nonicteric, conjunctiva non-inflamed.  Skin color pink. Mucous membranes moist.   Lungs clear to auscultation throughout. No wheezes or rales noted.   Cardiovascular regular, S1, S2 auscultated. No murmur noted.  Abdomen soft and without tenderness   Extremities without edema.     Able to move upper and lower extremities       New Labs/Diagnostics:  Covid-19 POS    Assessment/Plan:  (U07.1) Infection due to 2019 novel coronavirus  (primary encounter diagnosis)  Comment: acute infection, mildly symptomatic, no fever or cough noted  Plan: started on antiviral, likely acute covid delirium. 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     Jeana  JASSI Gresham, CNP ....................  11/8/2022   6:38 PM

## 2022-11-10 PROBLEM — F03.918 SENILE DEMENTIA, WITH BEHAVIORAL DISTURBANCE (H): Status: ACTIVE | Noted: 2022-01-01

## 2022-11-10 PROBLEM — R41.0 ACUTE DELIRIUM: Status: ACTIVE | Noted: 2022-01-01

## 2022-11-10 NOTE — PROGRESS NOTES
[facility]  Acute Care Visit    Patient Name: Lety Luna   : 1929  MRN: [id number]    Place of Service: [facility]  DOS: 2022    CC: follow up delirium    HPI:  Lety Luna is a 89 year old female with PMH of multiple chronic medical concerns, who is seen today regarding pt did not receive a dose of ativan yesterday due to inability to get her to take it. She continued to be paranoid and in distress due to her isolation and feeling as though we are keeping her prisoner.  She does not recognize staff. She did finally calm down last night and slept but was up this morning. She did take her first dose of ativan this morning around 6:30am with no effect 3 hours later. She continues to be very ramped up and is a threat to hurt herself or others.   Covid-19 symptoms: dry cough. Otherwise stable.   Vitals stable. NO fever.   Talked to son Ed and Devora and they understand the situation and the acute delirium. Did discuss giving her an antipsychotic to help her. They agree.       Multidisciplinary notes, laboratory values, medications, vital signs, weight and orders arereviewed from nursing home records. I have reviewed the patient s medical history and updated the computerized patient record.     PMH:  Past Medical History:   Diagnosis Date     Atrial fibrillation (H)     No anticoagulation due to GI bleed     Chronic kidney disease, stage III (moderate) (H)     No Comments Provided     Developmental disorder of scholastic skills     No Comments Provided     Essential (primary) hypertension     No Comments Provided     Gastrointestinal hemorrhage     2012,3 units pRBC     Gout     No Comments Provided     Hyperlipidemia     No Comments Provided     Obesity     No Comments Provided     Type 2 diabetes mellitus without complications (H)     No Comments Provided       Medications:  Current Outpatient Medications   Medication Sig Dispense Refill     acetaminophen (TYLENOL) 325 MG tablet  Take 2 tablets (650 mg) by mouth 4 times daily For acute left knee pain       acetaminophen (TYLENOL) 325 MG tablet Take 650 mg by mouth every 4 hours as needed for mild pain       allopurinol (ZYLOPRIM) 100 MG tablet TAKE 1 TABLET(100 MG) BY MOUTH DAILY 90 tablet 3     aspirin 81 MG tablet Take 81 mg by mouth daily with food       captopril (CAPOTEN) 50 MG tablet TAKE 1 TABLET(50 MG) BY MOUTH TWICE DAILY 180 tablet 3     diclofenac (VOLTAREN) 1 % topical gel Apply 4 g topically 4 times daily Left knee       diltiazem ER COATED BEADS (CARDIZEM CD/CARTIA XT) 240 MG 24 hr capsule TAKE 1 CAPSULE(240 MG) BY MOUTH DAILY 90 capsule 3     famotidine (PEPCID) 20 MG tablet Take 1 tablet (20 mg) by mouth daily       ferrous sulfate (FEROSUL) 325 (65 Fe) MG tablet Take 1 tablet (325 mg) by mouth daily (with breakfast) 100 tablet 3     LORazepam (ATIVAN) 0.5 MG tablet Take 1 tablet (0.5 mg) by mouth every 4 hours as needed for anxiety       Magnesium Cl-Calcium Carbonate (SLOW MAGNESIUM/CALCIUM)  MG TBEC Take 1 tablet by mouth 2 times daily       metoprolol succinate ER (TOPROL XL) 100 MG 24 hr tablet TAKE 1 TABLET BY MOUTH DAILY ALONG WITH 50MG TABLET TO EQUAL 150MG       metoprolol succinate ER (TOPROL XL) 50 MG 24 hr tablet TAKE 1 TABLET BY MOUTH DAILY ALONG WITH 100MG TABLET TO EQUAL 150MG       Miconazole Nitrate 2 % ointment Apply topically 2 times daily Apply thin layer to buttocks two times daily and prn       molnupiravir (LAGEVRIO) 200 MG capsule Take 4 capsules (800 mg) by mouth every 12 hours for 5 days 40 each 0     Nutritional Supplements (BOOST DIABETIC PO) Take 4 oz by mouth 2 times daily       order for DME Urinary incontinence pad / panty liner       pramoxine (PRAX) 1 % LOTN lotion Apply topically daily       Medication reconciliation complete between Epic record and NH MAR.     Allergies:  No Known Allergies    Review of Systems:  Limited ROS due to dementia/delirium. See HPI.         Vital  Signs:  Temp  96.7  Pulse 70   Respirations 20   /74   Oxygen Sats 95%       Physical Exam:     Alert, aggressive, yelling, threatening to call the , under great distress, physically hurting self and others--noted hand with bruise on knuckles due to hitting something  Sclera nonicteric, conjunctiva non-inflamed.  Skin color pink. Mucous membranes moist.   Lungs clear to auscultation throughout. No wheezes or rales noted.   Cardiovascular regular, S1, S2 auscultated. No murmur noted.  Abdomen soft and without tenderness   Extremities without edema.   Able to move upper and lower extremities         Assessment/Plan:  (R41.0) Acute delirium  (primary encounter diagnosis)  Comment: pt did not respond to ativan (only one dose given with no calming effect)  Plan: concern for pt safety and safety of others due to severe agitation, aggression  Give haldol 0.5 mg IM x 1 now. 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     JASSI Mason, CNP ....................  11/10/2022   2:46 PM

## 2022-11-10 NOTE — PROGRESS NOTES
[facility]  Acute Care Visit    Patient Name: Lety Luna   : 1929  MRN: [id number]    Place of Service: [facility]  DOS: 11/10/2022    CC: follow up delirium    HPI:  Lety Luna is a 89 year old female with PMH of multiple chronic medical concerns, who is seen today regarding yesterday after receiving haldol 0.5 mg IM x 1 around noon she responded very well. About an hour after injection, she calmed down, was no longer combative or paranoid. She ate a good meal, took all her meds, allowed cares, cleaned up. Pleasant. She did well the rest of the day and slept well last night. She woke up this morning, very agitated again. She was apparently very hungry and ate a large breakfast but through some of her food on the floor. She took 90% of her medications but the haldol did not come in from pharmacy in the oral form so another dose of haldol 0.5 mg IM was given at 0700 am. She was cornering staff, physically swinging at them, threatening to hurt them. She was not consolable. She wants to leave the unit and be with other people but this is not safe yet with her covid diagnosis. She continues to be quarantined which is causing her great distress and with her dementia contributing to delirium.   Again vitals are stable. No fever. Continues to urinate. Had a bowel movement this morning. Dry cough.       Multidisciplinary notes, laboratory values, medications, vital signs, weight and orders arereviewed from nursing home records. I have reviewed the patient s medical history and updated the computerized patient record.     PMH:  Past Medical History:   Diagnosis Date     Atrial fibrillation (H)     No anticoagulation due to GI bleed     Chronic kidney disease, stage III (moderate) (H)     No Comments Provided     Developmental disorder of scholastic skills     No Comments Provided     Essential (primary) hypertension     No Comments Provided     Gastrointestinal hemorrhage     ,3 units  pRBC     Gout     No Comments Provided     Hyperlipidemia     No Comments Provided     Obesity     No Comments Provided     Type 2 diabetes mellitus without complications (H)     No Comments Provided       Medications:  Current Outpatient Medications   Medication Sig Dispense Refill     acetaminophen (TYLENOL) 325 MG tablet Take 2 tablets (650 mg) by mouth 4 times daily For acute left knee pain       acetaminophen (TYLENOL) 325 MG tablet Take 650 mg by mouth every 4 hours as needed for mild pain       allopurinol (ZYLOPRIM) 100 MG tablet TAKE 1 TABLET(100 MG) BY MOUTH DAILY 90 tablet 3     aspirin 81 MG tablet Take 81 mg by mouth daily with food       captopril (CAPOTEN) 50 MG tablet TAKE 1 TABLET(50 MG) BY MOUTH TWICE DAILY 180 tablet 3     diclofenac (VOLTAREN) 1 % topical gel Apply 4 g topically 4 times daily Left knee       diltiazem ER COATED BEADS (CARDIZEM CD/CARTIA XT) 240 MG 24 hr capsule TAKE 1 CAPSULE(240 MG) BY MOUTH DAILY 90 capsule 3     famotidine (PEPCID) 20 MG tablet Take 1 tablet (20 mg) by mouth daily       ferrous sulfate (FEROSUL) 325 (65 Fe) MG tablet Take 1 tablet (325 mg) by mouth daily (with breakfast) 100 tablet 3     haloperidol (HALDOL) 0.5 MG tablet Take 1 tablet (0.5 mg) by mouth every 12 hours as needed for agitation If pt unable to take oral medication,  may give IM 0.5 mg q 12 hours PRN       Magnesium Cl-Calcium Carbonate (SLOW MAGNESIUM/CALCIUM)  MG TBEC Take 1 tablet by mouth 2 times daily       metoprolol succinate ER (TOPROL XL) 100 MG 24 hr tablet TAKE 1 TABLET BY MOUTH DAILY ALONG WITH 50MG TABLET TO EQUAL 150MG       metoprolol succinate ER (TOPROL XL) 50 MG 24 hr tablet TAKE 1 TABLET BY MOUTH DAILY ALONG WITH 100MG TABLET TO EQUAL 150MG       Miconazole Nitrate 2 % ointment Apply topically 2 times daily Apply thin layer to buttocks two times daily and prn       molnupiravir (LAGEVRIO) 200 MG capsule Take 4 capsules (800 mg) by mouth every 12 hours for 5 days 40 each 0      Nutritional Supplements (BOOST DIABETIC PO) Take 4 oz by mouth 2 times daily       order for DME Urinary incontinence pad / panty liner       pramoxine (PRAX) 1 % LOTN lotion Apply topically daily       Medication reconciliation complete between [facility] record and [facility].     Allergies:  No Known Allergies    Review of Systems:  Limited ROS due to dementia. See HPI.   Delirium    Vital Signs:  Temp 97.3   Pulse 86   Respirations 16   BP 97/61   Oxygen Sats 95%  Room air    Physical Exam:     Alert but distressed, continues to think she is being kept prisoner, trapped, paranoid, delusional  Sclera nonicteric, conjunctiva non-inflamed.  Skin color pink.   Lungs clear to auscultation throughout. No wheezes or rales noted.   Cardiovascular regular, S1, S2 auscultated. No murmur noted.  Abdomen soft and without tenderness   Extremities without edema.   Hand with bruise on knuckles.    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   Push fluids, offer frequent snacks and favorite foods.         A total of 47 minutes was spent with this patient, of which more than 50% was spent in counseling and/or coordination of care.     JASSI Mason, CNP ....................  11/10/2022   3:04 PM

## 2022-11-27 NOTE — PROGRESS NOTES
[facility]  Acute Care Visit    Patient Name: Lety Luna   : 1929  MRN: [id number]    Place of Service: [facility]  DOS: 2022    CC: post covid, delirium, diarrhea    HPI:  Lety Luna is a 89 year old female with PMH of multiple chronic medical concerns, who is seen today regarding pt is post-covid and did experience significant delirium while in isolation. She did well with haldol which was needed due to putting herself at risk for hurting herself and others with severe agitation, delusions not able to be calmed with other methods. She is now out of isolation and has still needed the haldol ranging 0-1x/day with most days needing one dose. BMP reviewed and she is dehydrated. Staff to push fluids. She does continue to have diarrhea. There was concern this was cdiff but staff were not able to get a sample because she then went a few days with no bowel movement.   Skin on buttocks is sore. Using aloe vesta protective ointment.       Multidisciplinary notes, laboratory values, medications, vital signs, weight and orders arereviewed from nursing home records. I have reviewed the patient s medical history and updated the computerized patient record.     PMH:  Past Medical History:   Diagnosis Date     Atrial fibrillation (H)     No anticoagulation due to GI bleed     Chronic kidney disease, stage III (moderate) (H)     No Comments Provided     Developmental disorder of scholastic skills     No Comments Provided     Essential (primary) hypertension     No Comments Provided     Gastrointestinal hemorrhage     2012,3 units pRBC     Gout     No Comments Provided     Hyperlipidemia     No Comments Provided     Obesity     No Comments Provided     Type 2 diabetes mellitus without complications (H)     No Comments Provided       Medications:  Current Outpatient Medications   Medication Sig Dispense Refill     acetaminophen (TYLENOL) 325 MG tablet Take 2 tablets (650 mg) by mouth 4 times  daily For acute left knee pain       acetaminophen (TYLENOL) 325 MG tablet Take 650 mg by mouth every 4 hours as needed for mild pain       allopurinol (ZYLOPRIM) 100 MG tablet TAKE 1 TABLET(100 MG) BY MOUTH DAILY 90 tablet 3     aspirin 81 MG tablet Take 81 mg by mouth daily with food       captopril (CAPOTEN) 50 MG tablet TAKE 1 TABLET(50 MG) BY MOUTH TWICE DAILY 180 tablet 3     diclofenac (VOLTAREN) 1 % topical gel Apply 4 g topically 4 times daily Left knee       diltiazem ER COATED BEADS (CARDIZEM CD/CARTIA XT) 240 MG 24 hr capsule TAKE 1 CAPSULE(240 MG) BY MOUTH DAILY 90 capsule 3     famotidine (PEPCID) 20 MG tablet Take 1 tablet (20 mg) by mouth daily       ferrous sulfate (FEROSUL) 325 (65 Fe) MG tablet Take 1 tablet (325 mg) by mouth daily (with breakfast) 100 tablet 3     haloperidol (HALDOL) 0.5 MG tablet Take 1 mg by mouth every 12 hours as needed for agitation If pt unable to take oral medication,  may give IM 1 mg q 12 hours PRN       Magnesium Cl-Calcium Carbonate (SLOW MAGNESIUM/CALCIUM)  MG TBEC Take 1 tablet by mouth 2 times daily       metoprolol succinate ER (TOPROL XL) 100 MG 24 hr tablet TAKE 1 TABLET BY MOUTH DAILY ALONG WITH 50MG TABLET TO EQUAL 150MG       metoprolol succinate ER (TOPROL XL) 50 MG 24 hr tablet TAKE 1 TABLET BY MOUTH DAILY ALONG WITH 100MG TABLET TO EQUAL 150MG       Miconazole Nitrate 2 % ointment Apply topically 2 times daily Apply thin layer to buttocks two times daily and prn       Nutritional Supplements (BOOST DIABETIC PO) Take 4 oz by mouth 2 times daily       order for DME Urinary incontinence pad / panty liner       pramoxine (PRAX) 1 % LOTN lotion Apply topically daily       Medication reconciliation complete between Epic record and NH MAR.     Allergies:  No Known Allergies    Review of Systems:  See HPI    Vital Signs:  Temp 97.1   Pulse 76   Respirations 18   /65   Oxygen Sats 97%   Weight 151.3#    Physical Exam:     Pleasant and alert without  distress. [place] self in hallway   Sclera nonicteric, conjunctiva non-inflamed.  Skin color pink. Mucous membranes dry   Lungs clear to auscultation throughout. No wheezes or rales noted.   Cardiovascular regular, S1, S2 auscultated. No murmur noted.  Abdomen soft and without tenderness   Extremities without edema.    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     JASSI Mason, CNP ....................  11/26/2022   7:56 PM

## 2022-11-28 NOTE — TELEPHONE ENCOUNTER
Michael sent Rx request for the following:      ALLOPURINOL 100MG TABLETS      Last Prescription Date:   9/21/2021  Last Fill Qty/Refills:         90, R-3    Last Office Visit:              11/22/2022   Future Office visit:           none    Wilfrid Cabrera RN, BSN  ....................  11/28/2022   1:23 PM

## 2022-12-01 NOTE — PROGRESS NOTES
[facility]  Acute Care Visit    Patient Name: Lety Luna   : 1929  MRN: [id number]    Place of Service: [facility]  DOS: 2022    CC: follow up    HPI:  Lety Luna is a 89 year old female with PMH of multiple chronic medical concerns, who is seen today regarding pt had been doing better for about 5 days stretch right before gi after she had been out of quarantine for a week or so. She had not been needing haldol for this stretch so it was stopped.   Over  weekend, her behaviors ramped up again and she was in much distress emotionally, not allowing cares to be done, yelling and throwing objects at staff. She was having diarrhea which is not uncommon for her but due to behaviors was not allowing cares to be done which was making her skin worse on her buttocks.   Labs had been done prior to this and she is dehydrated. Nursing manager had talked to family and they did not want her sent to ED and to manage her the best we could at nursing home. She has periods of time where she is a good drinker and other times she refuses.   Haldol was restarted over Thanksgiving weekend which did help her calm down and allow for cares to be done and she would eat/drink better then as well.   Her acute delirium started when she was ill with covid-19 although her symptoms were mild, the behaviors were heightened with being quarantined. Now that she is out of quarantine and back to her old routine, I suspect some of her behaviors are possibly due to post covid-19 delirium and poor reserves with progressive dementia and lacking the ability to bounce back and recover as dementia her progresses.   Currently she is DNR selective treatment but the focus has been comfort cares and to treat reversible causes. Family is aware of her behaviors and progressive decline. I don't suspect pt would tolerate being sent to ED and she more comfortable in her own space with less distress.        Multidisciplinary notes, laboratory values, medications, vital signs, weight and orders arereviewed from nursing home records. I have reviewed the patient s medical history and updated the computerized patient record.     PMH:  Past Medical History:   Diagnosis Date     Atrial fibrillation (H)     No anticoagulation due to GI bleed     Chronic kidney disease, stage III (moderate) (H)     No Comments Provided     Developmental disorder of scholastic skills     No Comments Provided     Essential (primary) hypertension     No Comments Provided     Gastrointestinal hemorrhage     2012,3 units pRBC     Gout     No Comments Provided     Hyperlipidemia     No Comments Provided     Obesity     No Comments Provided     Type 2 diabetes mellitus without complications (H)     No Comments Provided       Medications:  Current Outpatient Medications   Medication Sig Dispense Refill     acetaminophen (TYLENOL) 325 MG tablet Take 2 tablets (650 mg) by mouth 4 times daily For acute left knee pain       acetaminophen (TYLENOL) 325 MG tablet Take 650 mg by mouth every 4 hours as needed for mild pain       allopurinol (ZYLOPRIM) 100 MG tablet TAKE 1 TABLET(100 MG) BY MOUTH DAILY 90 tablet 3     aspirin 81 MG tablet Take 81 mg by mouth daily with food       captopril (CAPOTEN) 50 MG tablet TAKE 1 TABLET(50 MG) BY MOUTH TWICE DAILY 180 tablet 3     diclofenac (VOLTAREN) 1 % topical gel Apply 4 g topically 4 times daily Left knee       diltiazem ER COATED BEADS (CARDIZEM CD/CARTIA XT) 240 MG 24 hr capsule TAKE 1 CAPSULE(240 MG) BY MOUTH DAILY 90 capsule 3     famotidine (PEPCID) 20 MG tablet Take 1 tablet (20 mg) by mouth daily       ferrous sulfate (FEROSUL) 325 (65 Fe) MG tablet Take 1 tablet (325 mg) by mouth daily (with breakfast) 100 tablet 3     haloperidol (HALDOL) 0.5 MG tablet Take 1 mg by mouth every 12 hours as needed for agitation If pt unable to take oral medication,  may give IM 1 mg q 12 hours PRN       Magnesium  Cl-Calcium Carbonate (SLOW MAGNESIUM/CALCIUM)  MG TBEC Take 1 tablet by mouth 2 times daily       metoprolol succinate ER (TOPROL XL) 100 MG 24 hr tablet TAKE 1 TABLET BY MOUTH DAILY ALONG WITH 50MG TABLET TO EQUAL 150MG       metoprolol succinate ER (TOPROL XL) 50 MG 24 hr tablet TAKE 1 TABLET BY MOUTH DAILY ALONG WITH 100MG TABLET TO EQUAL 150MG       Miconazole Nitrate 2 % ointment Apply topically 2 times daily Apply thin layer to buttocks two times daily and prn       Nutritional Supplements (BOOST DIABETIC PO) Take 4 oz by mouth 2 times daily       order for DME Urinary incontinence pad / panty liner       pramoxine (PRAX) 1 % LOTN lotion Apply topically daily       Medication reconciliation complete between Epic record and NH MAR.     Allergies:  No Known Allergies    Review of Systems:  Limited ROS due to dementia. See HPI.   Denies pain this morning    Vital Signs:  Temp 97.7   Pulse 67   Respirations 18   /72   Oxygen Sats 96%   Weight 149.6#    Physical Exam:     Pleasant and alert without distress this morning. [place] self around in chair. Wants to read her Bible   Hunched over in wheelchair with rounded upper back.   Sclera nonicteric, conjunctiva non-inflamed. Glasses on  Skin color pink.   Lungs clear to auscultation throughout. No wheezes or rales noted.   Cardiovascular regular, S1, S2 auscultated. No murmur noted.  Abdomen soft and without tenderness   Extremities without edema.   Able to move upper and lower extremities       New Labs/Diagnostics:  Last Comprehensive Metabolic Panel:  Sodium   Date Value Ref Range Status   11/28/2022 135 (L) 136 - 145 mmol/L Final   06/08/2021 142 134 - 144 mmol/L Final     Potassium   Date Value Ref Range Status   11/28/2022 5.0 3.4 - 5.3 mmol/L Final   05/04/2022 4.2 3.5 - 5.1 mmol/L Final   06/08/2021 5.2 (H) 3.5 - 5.1 mmol/L Final     Chloride   Date Value Ref Range Status   11/28/2022 105 98 - 107 mmol/L Final   05/04/2022 98 98 - 107 mmol/L  Final   06/08/2021 108 (H) 98 - 107 mmol/L Final     Carbon Dioxide   Date Value Ref Range Status   06/08/2021 26 21 - 31 mmol/L Final     Carbon Dioxide (CO2)   Date Value Ref Range Status   11/28/2022 19 (L) 22 - 29 mmol/L Final   05/04/2022 29 21 - 31 mmol/L Final     Anion Gap   Date Value Ref Range Status   11/28/2022 11 7 - 15 mmol/L Final   05/04/2022 9 3 - 14 mmol/L Final   06/08/2021 8 3 - 14 mmol/L Final     Glucose   Date Value Ref Range Status   11/28/2022 181 (H) 70 - 99 mg/dL Final   05/04/2022 185 (H) 70 - 105 mg/dL Final   06/08/2021 124 (H) 70 - 105 mg/dL Final     Urea Nitrogen   Date Value Ref Range Status   11/28/2022 44.1 (H) 8.0 - 23.0 mg/dL Final   05/04/2022 28 (H) 7 - 25 mg/dL Final   06/08/2021 29 (H) 7 - 25 mg/dL Final     Creatinine   Date Value Ref Range Status   11/28/2022 1.25 (H) 0.51 - 0.95 mg/dL Final   06/08/2021 1.48 (H) 0.60 - 1.20 mg/dL Final     GFR Estimate   Date Value Ref Range Status   11/28/2022 40 (L) >60 mL/min/1.73m2 Final     Comment:     Effective December 21, 2021 eGFRcr in adults is calculated using the 2021 CKD-EPI creatinine equation which includes age and gender (James et al., NEJM, DOI: 10.1056/KBWFco6965770)   06/08/2021 33 (L) >60 mL/min/[1.73_m2] Final     Calcium   Date Value Ref Range Status   11/28/2022 8.6 8.2 - 9.6 mg/dL Final   06/08/2021 8.7 8.6 - 10.3 mg/dL Final     Lab Results   Component Value Date    WBC 7.7 11/28/2022    WBC 7.1 06/08/2021     Lab Results   Component Value Date    RBC 2.97 11/28/2022    RBC 3.27 06/08/2021     Lab Results   Component Value Date    HGB 10.0 11/28/2022    HGB 11.0 06/08/2021     Lab Results   Component Value Date    HCT 30.7 11/28/2022    HCT 33.7 06/08/2021     No components found for: MCT  Lab Results   Component Value Date     11/28/2022     06/08/2021     Lab Results   Component Value Date    MCH 33.7 11/28/2022    MCH 33.6 06/08/2021     Lab Results   Component Value Date    MCHC 32.6 11/28/2022     MCHC 32.6 06/08/2021     Lab Results   Component Value Date    RDW 13.2 11/28/2022    RDW 13.5 06/08/2021     Lab Results   Component Value Date     11/28/2022     06/08/2021     Overall kidney function improved she is still dehydrated and continues to have episodes of inability to be consoled, paranoid, focused on a delusion and won't let cares be done for her when she is in these hyper-focused states.    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, with behavioral disturbance  Comment: progressing, more acute episodes of delirium  Plan: cont haldol 1 mg q 12 hours PRN agitation, aggression, inconsolable, paranoia    (U07.1) Infection due to 2019 novel coronavirus  Comment: post covid delirium--pt has not been back to her baseline mentation since covid-19, increased behaviors  Plan: cont haldol as above, supportive cares      (I10) Benign essential hypertension  Comment: running lower end of normal  Plan: stop captopril    (E86.0) Dehydration  Comment: poor intake  Plan: push fluids as much as possible, pt would not tolerate IV  Stop captopril due to lower BPs    (U07.1,  N17.9) Acute kidney injury due to COVID-19 (H)  Comment: slight increase in Cr with covid but recent labs some improvement with increased fluid intake  Plan: cont to push fluids and stop captopril        A total of 48 minutes was spent with this patient, of which more than 50% was spent in counseling and/or coordination of care.     JASSI Mason, CNP ....................  12/1/2022   9:20 AM

## 2022-12-02 NOTE — TELEPHONE ENCOUNTER
Reason for call: Request for results.    Name of test or procedure: UA labs    Date of test or procedure: 11/29/2022    Location of test or procedure: [place]    Preferred method for responding to this message: Telephone Call    Phone number patient can be reached at: Other phone number:  [phone]    If we can't reach you directly, may we leave a detailed response at the number you provided?Yes

## 2022-12-02 NOTE — TELEPHONE ENCOUNTER
Please advise.    Patient was seen on 11/29/2022 for UTI symptoms. Patient called back today 12/02/2022 wanting those results. Patient's results were as below:        Contains abnormal data Urine Culture  Order: [id number]   Collected 11/29/2022 12:18 PM      Status: Final result      Visible to patient: No (scheduled for 12/9/2022 11:31 AM)     Specimen Information: Urine, Midstream    0 Result Notes  Culture >100,000 CFU/mL Escherichia coli ESBL Abnormal        50,000-100,000 CFU/mL Streptococcus agalactiae (Group B Streptococcus) Abnormal             Resulting Agency: [facility]       Susceptibility     Escherichia coli ESBL Streptococcus agalactiae (Group B Streptococcus)     BONY BONY     Amoxicillin/Clavulanate <=8  Susceptible       Ampicillin   <=0.06  Susceptible     Ampicillin/ Sulbactam <=8  Susceptible       Cefepime   <=0.25  Susceptible     Cefotaxime   <=0.25  Susceptible     Cefoxitin <=8  Susceptible       Ceftriaxone (meningitis)   <=0.25  Susceptible     Ceftriaxone (non-meningitis)   <=0.25  Susceptible     Ciprofloxacin  See below 1       Ertapenem <=0.5  Susceptible       Gentamicin <=4  Susceptible       Imipenem <=1  Susceptible       Levofloxacin  See below 2 1  Susceptible     Nitrofurantoin <=32  Susceptible       Penicillin   0.06  Susceptible     Piperacillin/Tazobactam <=16  Intermediate       Tetracycline <=4  Susceptible >4  Resistant     Tobramycin <=4  Susceptible       Trimethoprim <=8  Susceptible       Trimethoprim/Sulfamethoxazole <=2/38  Susceptible       Vancomycin   0.25  Susceptible                1 Ciprofloxacin not resistant.  Due to test limitations, lab cannot provide BONY to determine susceptibility.   2 Levofloxacin not resistant.  Due to test limitations, lab cannot provide BONY to determine susceptibility.     Oralia Wise RN  ....................  12/2/2022   2:22 PM

## 2022-12-12 PROBLEM — F22 DELUSIONS (H): Status: ACTIVE | Noted: 2022-01-01

## 2022-12-12 PROBLEM — N30.01 ACUTE CYSTITIS WITH HEMATURIA: Status: ACTIVE | Noted: 2022-01-01

## 2022-12-12 NOTE — PROGRESS NOTES
[facility]  60 day re-certification      Patient Name: Lety Luna   : 1929  MRN: [id number]    Place of Service: [facility]  DOS: 2022    CC: 60 day re-certification      HPI:  Lety Luna is a 89 year old female with PMH of multiple chronic medical concerns, who is seen today regarding 60 day re-certification for the following:    Senile dementia, with behavioral disturbance  Primary hypertension  Infection due to 2019 novel coronavirus  Acute cystitis with hematuria  Primary osteoarthritis of left knee    Pt was ill with covid-19 about 6 weeks ago and has not fully returned to her baseline. She has dementia which has been exacerbated with delusions, delirium, yelling, combative, throwing her shoes at staff, hitting when staff are trying to help her and do cares for her. She is not understanding she needs help. She is not recognizing family anymore; not consistently anyways. Talking with her son and daughter-in-law on the phone, she notes her mom did not recognize her and kept asking for her even though she was right in front of her.   It is significant to note that she has lost 20# over the past year when she was admitted to [facility]. She has adjusted well and then had a significant decline in dementia since infection with covid-19. She has responded well to haldol 1 mg PRN which calms the delusions/psychotic thinking for a time and staff are then able to care for her.   It was noted about a week ago that she had some pink tinged urine and UA/UC collected. Noted ESBL in urine which has been present since admission to [facility] a year ago. She was treated with Bactrim but hard to know if this is colonization or truly infection causing symptoms.    Talked with family and due to significant weight loss and significant progression of dementia, agree to comfort measures and referral to hospice.     Multidisciplinary notes, laboratory values, medications, vital signs,  weight and orders arereviewed from nursing home records. I have reviewed the patient s medical history and updated the computerized patient record.     PMH:  Past Medical History:   Diagnosis Date     Atrial fibrillation (H)     No anticoagulation due to GI bleed     Chronic kidney disease, stage III (moderate) (H)     No Comments Provided     Developmental disorder of scholastic skills     No Comments Provided     Essential (primary) hypertension     No Comments Provided     Gastrointestinal hemorrhage     2012,3 units pRBC     Gout     No Comments Provided     Hyperlipidemia     No Comments Provided     Obesity     No Comments Provided     Type 2 diabetes mellitus without complications (H)     No Comments Provided       Medications:  Current Outpatient Medications   Medication Sig Dispense Refill     acetaminophen (TYLENOL) 325 MG tablet Take 2 tablets (650 mg) by mouth 4 times daily For acute left knee pain       acetaminophen (TYLENOL) 325 MG tablet Take 650 mg by mouth every 4 hours as needed for mild pain       allopurinol (ZYLOPRIM) 100 MG tablet TAKE 1 TABLET(100 MG) BY MOUTH DAILY 90 tablet 3     aspirin 81 MG tablet Take 81 mg by mouth daily with food       diclofenac (VOLTAREN) 1 % topical gel Apply 4 g topically 4 times daily Left knee       diltiazem ER COATED BEADS (CARDIZEM CD/CARTIA XT) 240 MG 24 hr capsule TAKE 1 CAPSULE(240 MG) BY MOUTH DAILY 90 capsule 3     famotidine (PEPCID) 20 MG tablet Take 1 tablet (20 mg) by mouth daily       haloperidol (HALDOL) 0.5 MG tablet Take 1 mg by mouth every 12 hours as needed for agitation If pt unable to take oral medication,  may give IM 1 mg q 12 hours PRN       Magnesium Cl-Calcium Carbonate (SLOW MAGNESIUM/CALCIUM)  MG TBEC Take 1 tablet by mouth 2 times daily       metoprolol succinate ER (TOPROL XL) 100 MG 24 hr tablet TAKE 1 TABLET BY MOUTH DAILY ALONG WITH 50MG TABLET TO EQUAL 150MG       metoprolol succinate ER (TOPROL XL) 50 MG 24 hr tablet TAKE  1 TABLET BY MOUTH DAILY ALONG WITH 100MG TABLET TO EQUAL 150MG       Miconazole Nitrate 2 % ointment Apply topically 2 times daily Apply thin layer to buttocks two times daily and prn       Nutritional Supplements (BOOST DIABETIC PO) Take 4 oz by mouth 2 times daily       order for DME Urinary incontinence pad / panty liner       pramoxine (PRAX) 1 % LOTN lotion Apply topically daily       Medication reconciliation complete between Epic record and NH MAR.     Allergies:  No Known Allergies    Review of Systems:  Limited ROS due to dementia. See HPI.       Vital Signs:  Temp 98.1   Pulse 62   Respirations 18   /52   Oxygen Sats 97%   Weight 150# down 20# in past year    Physical Exam:     Pleasant and alert with mild to mod distress at times due to delusions, yelling, not understanding she needs help.    Sclera nonicteric, conjunctiva non-inflamed.  Skin color pink. Mucous membranes dry.   Lungs clear to auscultation throughout. No wheezes or rales noted.   Cardiovascular irregular. No murmur noted.  Abdomen soft and without tenderness   Extremities without edema.    In wheelchair  Able to move upper and lower extremities       New Labs/Diagnostics:  Last Comprehensive Metabolic Panel:  Sodium   Date Value Ref Range Status   11/28/2022 135 (L) 136 - 145 mmol/L Final   06/08/2021 142 134 - 144 mmol/L Final     Potassium   Date Value Ref Range Status   11/28/2022 5.0 3.4 - 5.3 mmol/L Final   05/04/2022 4.2 3.5 - 5.1 mmol/L Final   06/08/2021 5.2 (H) 3.5 - 5.1 mmol/L Final     Chloride   Date Value Ref Range Status   11/28/2022 105 98 - 107 mmol/L Final   05/04/2022 98 98 - 107 mmol/L Final   06/08/2021 108 (H) 98 - 107 mmol/L Final     Carbon Dioxide   Date Value Ref Range Status   06/08/2021 26 21 - 31 mmol/L Final     Carbon Dioxide (CO2)   Date Value Ref Range Status   11/28/2022 19 (L) 22 - 29 mmol/L Final   05/04/2022 29 21 - 31 mmol/L Final     Anion Gap   Date Value Ref Range Status   11/28/2022 11 7 - 15  mmol/L Final   05/04/2022 9 3 - 14 mmol/L Final   06/08/2021 8 3 - 14 mmol/L Final     Glucose   Date Value Ref Range Status   11/28/2022 181 (H) 70 - 99 mg/dL Final   05/04/2022 185 (H) 70 - 105 mg/dL Final   06/08/2021 124 (H) 70 - 105 mg/dL Final     Urea Nitrogen   Date Value Ref Range Status   11/28/2022 44.1 (H) 8.0 - 23.0 mg/dL Final   05/04/2022 28 (H) 7 - 25 mg/dL Final   06/08/2021 29 (H) 7 - 25 mg/dL Final     Creatinine   Date Value Ref Range Status   11/28/2022 1.25 (H) 0.51 - 0.95 mg/dL Final   06/08/2021 1.48 (H) 0.60 - 1.20 mg/dL Final     GFR Estimate   Date Value Ref Range Status   11/28/2022 40 (L) >60 mL/min/1.73m2 Final     Comment:     Effective December 21, 2021 eGFRcr in adults is calculated using the 2021 CKD-EPI creatinine equation which includes age and gender (James et al., NEJ, DOI: 10.1056/GMEHls2327537)   06/08/2021 33 (L) >60 mL/min/[1.73_m2] Final     Calcium   Date Value Ref Range Status   11/28/2022 8.6 8.2 - 9.6 mg/dL Final   06/08/2021 8.7 8.6 - 10.3 mg/dL Final     Lab Results   Component Value Date    WBC 7.7 11/28/2022    WBC 7.1 06/08/2021     Lab Results   Component Value Date    RBC 2.97 11/28/2022    RBC 3.27 06/08/2021     Lab Results   Component Value Date    HGB 10.0 11/28/2022    HGB 11.0 06/08/2021     Lab Results   Component Value Date    HCT 30.7 11/28/2022    HCT 33.7 06/08/2021     No components found for: MCT  Lab Results   Component Value Date     11/28/2022     06/08/2021     Lab Results   Component Value Date    MCH 33.7 11/28/2022    MCH 33.6 06/08/2021     Lab Results   Component Value Date    MCHC 32.6 11/28/2022    MCHC 32.6 06/08/2021     Lab Results   Component Value Date    RDW 13.2 11/28/2022    RDW 13.5 06/08/2021     Lab Results   Component Value Date     11/28/2022     06/08/2021     BUN remains elevated, some improvement in kidney function.    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     (I10) Primary hypertension  Comment: stable; did need to stop captopril 11/28 due to lower BPs which have stabilized now  Plan: cont diltiazem and metoprolol    (U07.1) Infection due to 2019 novel coronavirus  Comment: hx, recent infection contributed to severe delirium  Plan: cont haldol; concern for decline in health, weight loss, referral to hospice after talking to family    (N30.01) Acute cystitis with hematuria  Comment: staff noted a light pink tinge to urine, likely hematuria  Plan: UA/UC now being treated with bactrim DS    (M17.12) Primary osteoarthritis of left knee  Comment: chronic, pain controlled  Plan: cont tylenol scheduled        A total of 79 minutes was spent with this patient, of which more than 50% was spent in counseling and/or coordination of care.     JASSI Mason, CNP ....................  12/12/2022   10:03 AM

## 2024-01-09 NOTE — TELEPHONE ENCOUNTER
----- Message from Yardlie Toussaint-Foster, DO sent at 1/8/2024  2:03 PM EST -----  Please call patient and inform her that she needs a colposcopy forn nml pap with HPV 16. Thx  ----- Message -----  From: Lab, Background User  Sent: 12/28/2023   5:34 PM EST  To: Yardlie Toussaint-Foster, DO       Patient Information     Patient Name MRN Sex Lety Contreras [id number] Female 1929      Telephone Encounter by Ellen Bravo RN at 2018  3:06 PM     Author:  Ellen Bravo RN Service:  (none) Author Type:  NURS- Registered Nurse     Filed:  2018  3:24 PM Encounter Date:  2018 Status:  Signed     :  Ellen Bravo RN (NURS- Registered Nurse)            Pharmacy wants refill of Lasix. With review of chart, Anastasiia Argueta MD ordered this medication at office visit on 17 for swelling to left ankle. Only 30 pills given. Office visit notes state that patient is to follow-up if swelling is not improving. No appointment is scheduled. Routed to Anastasiia Argueta MD for consideration of refill.   Unable to complete prescription refill per RN Medication Refill Policy.................... Ellen Bravo RN ....................  2018   3:22 PM    Refill request from: Michael  Medication: furosemide (LASIX) 20 mg tablet    Qty:30 tablet   Ref:0  Start: 2017   End:              Route:Oral                Class:eRx    Sig:Take 1 tablet by mouth once daily if needed for Other (Specify Swelling).    Quantity requested: #30  Last visit with ANASTASIIA ARGUETA was on: 2017 in [facility]  PCP:  Anastasiia Argueta MD  Diagnosis r/t this medication request: Peripheral edema     Unable to complete prescription refill per RN Medication Refill Policy.................... Ellen Bravo RN ....................  2018   3:06 PM

## (undated) RX ORDER — SODIUM CHLORIDE 9 MG/ML
INJECTION, SOLUTION INTRAVENOUS
Status: DISPENSED
Start: 2021-01-01

## (undated) RX ORDER — FLUCONAZOLE 150 MG/1
TABLET ORAL
Status: DISPENSED
Start: 2021-01-01

## (undated) RX ORDER — CEFTRIAXONE SODIUM 1 G/50ML
INJECTION, SOLUTION INTRAVENOUS
Status: DISPENSED
Start: 2021-01-01

## (undated) RX ORDER — LIDOCAINE HYDROCHLORIDE 10 MG/ML
INJECTION, SOLUTION INFILTRATION; PERINEURAL
Status: DISPENSED
Start: 2018-09-21

## (undated) RX ORDER — HYDROCODONE BITARTRATE AND ACETAMINOPHEN 5; 325 MG/1; MG/1
TABLET ORAL
Status: DISPENSED
Start: 2019-10-16

## (undated) RX ORDER — MAGNESIUM SULFATE HEPTAHYDRATE 40 MG/ML
INJECTION, SOLUTION INTRAVENOUS
Status: DISPENSED
Start: 2021-01-01